# Patient Record
Sex: FEMALE | Race: WHITE | NOT HISPANIC OR LATINO | Employment: FULL TIME | URBAN - METROPOLITAN AREA
[De-identification: names, ages, dates, MRNs, and addresses within clinical notes are randomized per-mention and may not be internally consistent; named-entity substitution may affect disease eponyms.]

---

## 2021-03-30 ENCOUNTER — TRANSFERRED RECORDS (OUTPATIENT)
Dept: HEALTH INFORMATION MANAGEMENT | Facility: CLINIC | Age: 41
End: 2021-03-30

## 2023-06-20 ENCOUNTER — TELEPHONE (OUTPATIENT)
Dept: TRANSPLANT | Facility: CLINIC | Age: 43
End: 2023-06-20

## 2023-06-20 NOTE — TELEPHONE ENCOUNTER
Due to Phone Visit, verbal consent received for Care Everywhere Authorization from the patient during this visit.

## 2023-06-23 RX ORDER — MULTIVITAMIN,THERAPEUTIC
1 TABLET ORAL DAILY
COMMUNITY

## 2023-06-23 RX ORDER — TACROLIMUS 1 MG/1
2 CAPSULE ORAL 2 TIMES DAILY
COMMUNITY
Start: 2023-05-01 | End: 2023-10-24

## 2023-06-23 RX ORDER — PANTOPRAZOLE SODIUM 40 MG/1
1 TABLET, DELAYED RELEASE ORAL DAILY
COMMUNITY
Start: 2022-07-28 | End: 2024-09-23

## 2023-06-23 RX ORDER — MYCOPHENOLIC ACID 180 MG/1
TABLET, DELAYED RELEASE ORAL
COMMUNITY
Start: 2023-03-02 | End: 2024-03-05

## 2023-06-23 RX ORDER — ALBUTEROL SULFATE 90 UG/1
2 AEROSOL, METERED RESPIRATORY (INHALATION)
COMMUNITY
Start: 2021-12-27

## 2023-06-23 RX ORDER — TACROLIMUS 0.5 MG/1
0.5 CAPSULE ORAL DAILY
COMMUNITY
Start: 2023-05-01 | End: 2023-10-24

## 2023-06-23 NOTE — TELEPHONE ENCOUNTER
Records reviewed.  LRRT (mother) from 4/8/21, d/t IgA.  Most recent OP Transplant Nehprology visit note from Dr. Gilligan ([facility]) on 6/8/23.   Most recent creatinine 1.5, at baseline of 1.4 - 1.6.   No identified post-transplant complications (BK / infection, rejection, non-adherence).  Operative report available from Dr. Matthews 4/8/21.  Crossmatch unavailable in CE, PCS has reached out to [facility] to obtain.     Will establish with Dr. Dasilva 10/23/23.

## 2023-07-05 ENCOUNTER — MEDICAL CORRESPONDENCE (OUTPATIENT)
Dept: HEALTH INFORMATION MANAGEMENT | Facility: CLINIC | Age: 43
End: 2023-07-05

## 2023-08-12 ENCOUNTER — LAB (OUTPATIENT)
Dept: LAB | Facility: CLINIC | Age: 43
End: 2023-08-12
Payer: COMMERCIAL

## 2023-08-12 DIAGNOSIS — Z94.0 S/P KIDNEY TRANSPLANT: ICD-10-CM

## 2023-08-12 LAB
ALBUMIN MFR UR ELPH: 17.4 MG/DL
ALBUMIN UR-MCNC: 10 MG/DL
ANION GAP SERPL CALCULATED.3IONS-SCNC: 14 MMOL/L (ref 7–15)
APPEARANCE UR: CLEAR
BASOPHILS # BLD AUTO: 0.1 10E3/UL (ref 0–0.2)
BASOPHILS NFR BLD AUTO: 1 %
BILIRUB UR QL STRIP: NEGATIVE
BUN SERPL-MCNC: 21 MG/DL (ref 6–20)
CALCIUM SERPL-MCNC: 9.4 MG/DL (ref 8.6–10)
CHLORIDE SERPL-SCNC: 106 MMOL/L (ref 98–107)
COLOR UR AUTO: ABNORMAL
CREAT SERPL-MCNC: 1.76 MG/DL (ref 0.51–0.95)
CREAT UR-MCNC: 205 MG/DL
CREAT UR-MCNC: 208 MG/DL
DEPRECATED HCO3 PLAS-SCNC: 22 MMOL/L (ref 22–29)
EOSINOPHIL # BLD AUTO: 0.2 10E3/UL (ref 0–0.7)
EOSINOPHIL NFR BLD AUTO: 4 %
ERYTHROCYTE [DISTWIDTH] IN BLOOD BY AUTOMATED COUNT: 12.4 % (ref 10–15)
GFR SERPL CREATININE-BSD FRML MDRD: 36 ML/MIN/1.73M2
GLUCOSE SERPL-MCNC: 84 MG/DL (ref 70–99)
GLUCOSE UR STRIP-MCNC: NEGATIVE MG/DL
HCT VFR BLD AUTO: 39 % (ref 35–47)
HGB BLD-MCNC: 12.2 G/DL (ref 11.7–15.7)
HGB UR QL STRIP: NEGATIVE
IMM GRANULOCYTES # BLD: 0 10E3/UL
IMM GRANULOCYTES NFR BLD: 1 %
KETONES UR STRIP-MCNC: NEGATIVE MG/DL
LEUKOCYTE ESTERASE UR QL STRIP: NEGATIVE
LYMPHOCYTES # BLD AUTO: 1.2 10E3/UL (ref 0.8–5.3)
LYMPHOCYTES NFR BLD AUTO: 24 %
MAGNESIUM SERPL-MCNC: 2 MG/DL (ref 1.7–2.3)
MCH RBC QN AUTO: 28.4 PG (ref 26.5–33)
MCHC RBC AUTO-ENTMCNC: 31.3 G/DL (ref 31.5–36.5)
MCV RBC AUTO: 91 FL (ref 78–100)
MICROALBUMIN UR-MCNC: <12 MG/L
MICROALBUMIN/CREAT UR: NORMAL MG/G{CREAT}
MONOCYTES # BLD AUTO: 0.5 10E3/UL (ref 0–1.3)
MONOCYTES NFR BLD AUTO: 11 %
MUCOUS THREADS #/AREA URNS LPF: PRESENT /LPF
NEUTROPHILS # BLD AUTO: 3 10E3/UL (ref 1.6–8.3)
NEUTROPHILS NFR BLD AUTO: 59 %
NITRATE UR QL: NEGATIVE
NRBC # BLD AUTO: 0 10E3/UL
NRBC BLD AUTO-RTO: 0 /100
PH UR STRIP: 5.5 [PH] (ref 5–7)
PHOSPHATE SERPL-MCNC: 3.1 MG/DL (ref 2.5–4.5)
PLATELET # BLD AUTO: 215 10E3/UL (ref 150–450)
POTASSIUM SERPL-SCNC: 4.1 MMOL/L (ref 3.4–5.3)
PROT/CREAT 24H UR: 0.08 MG/MG CR (ref 0–0.2)
RBC # BLD AUTO: 4.29 10E6/UL (ref 3.8–5.2)
RBC URINE: 5 /HPF
SODIUM SERPL-SCNC: 142 MMOL/L (ref 136–145)
SP GR UR STRIP: 1.02 (ref 1–1.03)
SQUAMOUS EPITHELIAL: 3 /HPF
UROBILINOGEN UR STRIP-MCNC: NORMAL MG/DL
WBC # BLD AUTO: 4.9 10E3/UL (ref 4–11)
WBC URINE: 1 /HPF

## 2023-08-12 PROCEDURE — 85025 COMPLETE CBC W/AUTO DIFF WBC: CPT

## 2023-08-12 PROCEDURE — 80197 ASSAY OF TACROLIMUS: CPT

## 2023-08-12 PROCEDURE — 36415 COLL VENOUS BLD VENIPUNCTURE: CPT

## 2023-08-12 PROCEDURE — 84156 ASSAY OF PROTEIN URINE: CPT

## 2023-08-12 PROCEDURE — 82570 ASSAY OF URINE CREATININE: CPT

## 2023-08-12 PROCEDURE — 81001 URINALYSIS AUTO W/SCOPE: CPT

## 2023-08-12 PROCEDURE — 83735 ASSAY OF MAGNESIUM: CPT

## 2023-08-12 PROCEDURE — 80048 BASIC METABOLIC PNL TOTAL CA: CPT

## 2023-08-12 PROCEDURE — 84100 ASSAY OF PHOSPHORUS: CPT

## 2023-08-13 LAB
TACROLIMUS BLD-MCNC: 6 UG/L (ref 5–15)
TME LAST DOSE: NORMAL H
TME LAST DOSE: NORMAL H

## 2023-09-09 ENCOUNTER — LAB (OUTPATIENT)
Dept: LAB | Facility: CLINIC | Age: 43
End: 2023-09-09
Payer: COMMERCIAL

## 2023-09-09 DIAGNOSIS — Z94.0 S/P KIDNEY TRANSPLANT: ICD-10-CM

## 2023-09-09 LAB
ALBUMIN MFR UR ELPH: 8.4 MG/DL
ALBUMIN UR-MCNC: NEGATIVE MG/DL
ANION GAP SERPL CALCULATED.3IONS-SCNC: 10 MMOL/L (ref 7–15)
APPEARANCE UR: CLEAR
BASOPHILS # BLD AUTO: 0 10E3/UL (ref 0–0.2)
BASOPHILS NFR BLD AUTO: 1 %
BILIRUB UR QL STRIP: NEGATIVE
BUN SERPL-MCNC: 24 MG/DL (ref 6–20)
CALCIUM SERPL-MCNC: 9.6 MG/DL (ref 8.6–10)
CHLORIDE SERPL-SCNC: 106 MMOL/L (ref 98–107)
COLOR UR AUTO: NORMAL
CREAT SERPL-MCNC: 1.62 MG/DL (ref 0.51–0.95)
CREAT UR-MCNC: 98.9 MG/DL
CREAT UR-MCNC: 99.3 MG/DL
DEPRECATED HCO3 PLAS-SCNC: 24 MMOL/L (ref 22–29)
EGFRCR SERPLBLD CKD-EPI 2021: 40 ML/MIN/1.73M2
EOSINOPHIL # BLD AUTO: 0.1 10E3/UL (ref 0–0.7)
EOSINOPHIL NFR BLD AUTO: 2 %
ERYTHROCYTE [DISTWIDTH] IN BLOOD BY AUTOMATED COUNT: 12.2 % (ref 10–15)
GLUCOSE SERPL-MCNC: 92 MG/DL (ref 70–99)
GLUCOSE UR STRIP-MCNC: NEGATIVE MG/DL
HCT VFR BLD AUTO: 37.2 % (ref 35–47)
HGB BLD-MCNC: 11.9 G/DL (ref 11.7–15.7)
HGB UR QL STRIP: NEGATIVE
IMM GRANULOCYTES # BLD: 0 10E3/UL
IMM GRANULOCYTES NFR BLD: 0 %
KETONES UR STRIP-MCNC: NEGATIVE MG/DL
LEUKOCYTE ESTERASE UR QL STRIP: NEGATIVE
LYMPHOCYTES # BLD AUTO: 0.9 10E3/UL (ref 0.8–5.3)
LYMPHOCYTES NFR BLD AUTO: 16 %
MAGNESIUM SERPL-MCNC: 1.8 MG/DL (ref 1.7–2.3)
MCH RBC QN AUTO: 29 PG (ref 26.5–33)
MCHC RBC AUTO-ENTMCNC: 32 G/DL (ref 31.5–36.5)
MCV RBC AUTO: 91 FL (ref 78–100)
MICROALBUMIN UR-MCNC: <12 MG/L
MICROALBUMIN/CREAT UR: NORMAL MG/G{CREAT}
MONOCYTES # BLD AUTO: 0.6 10E3/UL (ref 0–1.3)
MONOCYTES NFR BLD AUTO: 12 %
NEUTROPHILS # BLD AUTO: 3.8 10E3/UL (ref 1.6–8.3)
NEUTROPHILS NFR BLD AUTO: 69 %
NITRATE UR QL: NEGATIVE
NRBC # BLD AUTO: 0 10E3/UL
NRBC BLD AUTO-RTO: 0 /100
PH UR STRIP: 6 [PH] (ref 5–7)
PHOSPHATE SERPL-MCNC: 3.2 MG/DL (ref 2.5–4.5)
PLATELET # BLD AUTO: 215 10E3/UL (ref 150–450)
POTASSIUM SERPL-SCNC: 4.5 MMOL/L (ref 3.4–5.3)
PROT/CREAT 24H UR: 0.08 MG/MG CR (ref 0–0.2)
RBC # BLD AUTO: 4.11 10E6/UL (ref 3.8–5.2)
RBC URINE: 0 /HPF
SODIUM SERPL-SCNC: 140 MMOL/L (ref 136–145)
SP GR UR STRIP: 1.02 (ref 1–1.03)
SQUAMOUS EPITHELIAL: 1 /HPF
TACROLIMUS BLD-MCNC: 5.3 UG/L (ref 5–15)
TME LAST DOSE: NORMAL H
TME LAST DOSE: NORMAL H
UROBILINOGEN UR STRIP-MCNC: NORMAL MG/DL
WBC # BLD AUTO: 5.5 10E3/UL (ref 4–11)
WBC URINE: 1 /HPF

## 2023-09-09 PROCEDURE — 84100 ASSAY OF PHOSPHORUS: CPT | Performed by: PATHOLOGY

## 2023-09-09 PROCEDURE — 80048 BASIC METABOLIC PNL TOTAL CA: CPT | Performed by: PATHOLOGY

## 2023-09-09 PROCEDURE — 80197 ASSAY OF TACROLIMUS: CPT | Performed by: INTERNAL MEDICINE

## 2023-09-09 PROCEDURE — 81001 URINALYSIS AUTO W/SCOPE: CPT | Performed by: PATHOLOGY

## 2023-09-09 PROCEDURE — 84156 ASSAY OF PROTEIN URINE: CPT | Performed by: PATHOLOGY

## 2023-09-09 PROCEDURE — 82570 ASSAY OF URINE CREATININE: CPT | Performed by: INTERNAL MEDICINE

## 2023-09-09 PROCEDURE — 85025 COMPLETE CBC W/AUTO DIFF WBC: CPT | Performed by: PATHOLOGY

## 2023-09-09 PROCEDURE — 36415 COLL VENOUS BLD VENIPUNCTURE: CPT | Performed by: PATHOLOGY

## 2023-09-09 PROCEDURE — 83735 ASSAY OF MAGNESIUM: CPT | Performed by: PATHOLOGY

## 2023-09-09 PROCEDURE — 99000 SPECIMEN HANDLING OFFICE-LAB: CPT | Performed by: PATHOLOGY

## 2023-10-07 ENCOUNTER — LAB (OUTPATIENT)
Dept: LAB | Facility: CLINIC | Age: 43
End: 2023-10-07
Payer: COMMERCIAL

## 2023-10-07 DIAGNOSIS — Z94.0 S/P KIDNEY TRANSPLANT: ICD-10-CM

## 2023-10-07 LAB
ALBUMIN MFR UR ELPH: 15.1 MG/DL
ALBUMIN UR-MCNC: NEGATIVE MG/DL
ANION GAP SERPL CALCULATED.3IONS-SCNC: 10 MMOL/L (ref 7–15)
APPEARANCE UR: CLEAR
BACTERIA #/AREA URNS HPF: ABNORMAL /HPF
BASO+EOS+MONOS # BLD AUTO: ABNORMAL 10*3/UL
BASO+EOS+MONOS NFR BLD AUTO: ABNORMAL %
BASOPHILS # BLD AUTO: 0.1 10E3/UL (ref 0–0.2)
BASOPHILS NFR BLD AUTO: 1 %
BILIRUB UR QL STRIP: NEGATIVE
BUN SERPL-MCNC: 22.1 MG/DL (ref 6–20)
CALCIUM SERPL-MCNC: 9.7 MG/DL (ref 8.6–10)
CHLORIDE SERPL-SCNC: 108 MMOL/L (ref 98–107)
COLOR UR AUTO: YELLOW
CREAT SERPL-MCNC: 1.55 MG/DL (ref 0.51–0.95)
CREAT UR-MCNC: 146 MG/DL
CREAT UR-MCNC: 146 MG/DL
DEPRECATED HCO3 PLAS-SCNC: 24 MMOL/L (ref 22–29)
EGFRCR SERPLBLD CKD-EPI 2021: 42 ML/MIN/1.73M2
EOSINOPHIL # BLD AUTO: 0.2 10E3/UL (ref 0–0.7)
EOSINOPHIL NFR BLD AUTO: 3 %
ERYTHROCYTE [DISTWIDTH] IN BLOOD BY AUTOMATED COUNT: 12.1 % (ref 10–15)
GLUCOSE SERPL-MCNC: 97 MG/DL (ref 70–99)
GLUCOSE UR STRIP-MCNC: NEGATIVE MG/DL
HCT VFR BLD AUTO: 41.4 % (ref 35–47)
HGB BLD-MCNC: 12.5 G/DL (ref 11.7–15.7)
HGB UR QL STRIP: NEGATIVE
IMM GRANULOCYTES # BLD: 0 10E3/UL
IMM GRANULOCYTES NFR BLD: 0 %
KETONES UR STRIP-MCNC: NEGATIVE MG/DL
LEUKOCYTE ESTERASE UR QL STRIP: NEGATIVE
LYMPHOCYTES # BLD AUTO: 1.2 10E3/UL (ref 0.8–5.3)
LYMPHOCYTES NFR BLD AUTO: 18 %
MAGNESIUM SERPL-MCNC: 1.8 MG/DL (ref 1.7–2.3)
MCH RBC QN AUTO: 27.9 PG (ref 26.5–33)
MCHC RBC AUTO-ENTMCNC: 30.2 G/DL (ref 31.5–36.5)
MCV RBC AUTO: 92 FL (ref 78–100)
MICROALBUMIN UR-MCNC: <12 MG/L
MICROALBUMIN/CREAT UR: NORMAL MG/G{CREAT}
MONOCYTES # BLD AUTO: 0.7 10E3/UL (ref 0–1.3)
MONOCYTES NFR BLD AUTO: 10 %
NEUTROPHILS # BLD AUTO: 4.5 10E3/UL (ref 1.6–8.3)
NEUTROPHILS NFR BLD AUTO: 68 %
NITRATE UR QL: NEGATIVE
PH UR STRIP: 6 [PH] (ref 5–7)
PHOSPHATE SERPL-MCNC: 3.1 MG/DL (ref 2.5–4.5)
PLATELET # BLD AUTO: 233 10E3/UL (ref 150–450)
POTASSIUM SERPL-SCNC: 5 MMOL/L (ref 3.4–5.3)
PROT/CREAT 24H UR: 0.1 MG/MG CR (ref 0–0.2)
RBC # BLD AUTO: 4.48 10E6/UL (ref 3.8–5.2)
RBC #/AREA URNS AUTO: ABNORMAL /HPF
SODIUM SERPL-SCNC: 142 MMOL/L (ref 135–145)
SP GR UR STRIP: 1.02 (ref 1–1.03)
SQUAMOUS #/AREA URNS AUTO: ABNORMAL /LPF
UROBILINOGEN UR STRIP-ACNC: 0.2 E.U./DL
WBC # BLD AUTO: 6.6 10E3/UL (ref 4–11)
WBC #/AREA URNS AUTO: ABNORMAL /HPF

## 2023-10-07 PROCEDURE — 80197 ASSAY OF TACROLIMUS: CPT

## 2023-10-07 PROCEDURE — 84100 ASSAY OF PHOSPHORUS: CPT

## 2023-10-07 PROCEDURE — 80048 BASIC METABOLIC PNL TOTAL CA: CPT

## 2023-10-07 PROCEDURE — 81001 URINALYSIS AUTO W/SCOPE: CPT

## 2023-10-07 PROCEDURE — 85025 COMPLETE CBC W/AUTO DIFF WBC: CPT

## 2023-10-07 PROCEDURE — 84156 ASSAY OF PROTEIN URINE: CPT

## 2023-10-07 PROCEDURE — 82043 UR ALBUMIN QUANTITATIVE: CPT

## 2023-10-07 PROCEDURE — 83735 ASSAY OF MAGNESIUM: CPT

## 2023-10-07 PROCEDURE — 82570 ASSAY OF URINE CREATININE: CPT

## 2023-10-07 PROCEDURE — 36415 COLL VENOUS BLD VENIPUNCTURE: CPT

## 2023-10-08 LAB
TACROLIMUS BLD-MCNC: 5.2 UG/L (ref 5–15)
TME LAST DOSE: NORMAL H
TME LAST DOSE: NORMAL H

## 2023-10-18 DIAGNOSIS — D84.9 IMMUNOSUPPRESSED STATUS (H): ICD-10-CM

## 2023-10-18 DIAGNOSIS — Z94.0 KIDNEY TRANSPLANTED: Primary | ICD-10-CM

## 2023-10-20 ENCOUNTER — TELEPHONE (OUTPATIENT)
Dept: TRANSPLANT | Facility: CLINIC | Age: 43
End: 2023-10-20
Payer: COMMERCIAL

## 2023-10-20 DIAGNOSIS — Z20.828 CONTACT WITH AND (SUSPECTED) EXPOSURE TO OTHER VIRAL COMMUNICABLE DISEASES: Primary | ICD-10-CM

## 2023-10-20 DIAGNOSIS — Z79.899 ENCOUNTER FOR LONG-TERM CURRENT USE OF MEDICATION: ICD-10-CM

## 2023-10-20 DIAGNOSIS — Z94.0 KIDNEY REPLACED BY TRANSPLANT: ICD-10-CM

## 2023-10-20 DIAGNOSIS — Z48.298 AFTERCARE FOLLOWING ORGAN TRANSPLANT: ICD-10-CM

## 2023-10-20 DIAGNOSIS — Z98.890 OTHER SPECIFIED POSTPROCEDURAL STATES: ICD-10-CM

## 2023-10-20 NOTE — TELEPHONE ENCOUNTER
Spoke with Daly to introduce myself as her transplant coordinator. Reviewed upcoming appointments and lab work expectations for lab work for the future (every other month), location of clinic, etc.     Patient had no questions. Tansler message sent with details. Lab orders updated.

## 2023-10-23 ENCOUNTER — OFFICE VISIT (OUTPATIENT)
Dept: TRANSPLANT | Facility: CLINIC | Age: 43
End: 2023-10-23
Attending: INTERNAL MEDICINE

## 2023-10-23 VITALS
OXYGEN SATURATION: 100 % | DIASTOLIC BLOOD PRESSURE: 92 MMHG | HEART RATE: 70 BPM | SYSTOLIC BLOOD PRESSURE: 143 MMHG | WEIGHT: 143 LBS

## 2023-10-23 DIAGNOSIS — Z48.298 AFTERCARE FOLLOWING ORGAN TRANSPLANT: Primary | ICD-10-CM

## 2023-10-23 DIAGNOSIS — N02.B9 IGA NEPHROPATHY: ICD-10-CM

## 2023-10-23 DIAGNOSIS — Z94.0 KIDNEY REPLACED BY TRANSPLANT: ICD-10-CM

## 2023-10-23 DIAGNOSIS — D84.9 IMMUNOSUPPRESSION (H): ICD-10-CM

## 2023-10-23 PROCEDURE — 99204 OFFICE O/P NEW MOD 45 MIN: CPT | Performed by: INTERNAL MEDICINE

## 2023-10-23 PROCEDURE — 99213 OFFICE O/P EST LOW 20 MIN: CPT | Performed by: INTERNAL MEDICINE

## 2023-10-23 NOTE — PROGRESS NOTES
TRANSPLANT NEPHROLOGY CHRONIC POST TRANSPLANT VISIT    Assessment & Plan   # LDKT: Stable   - Baseline Creatinine:  ~ 1.4-1.6   - Proteinuria: Normal (<0.2 grams)   - Date DSA Last Checked:      Latest DSA: Not checked recently, but was negative with last check   - BK Viremia: No   - Kidney Tx Biopsy: No    # Immunosuppression: Tacrolimus immediate release (goal 4-6) and Mycophenolic acid (dose 360, 180, 360)   - Continue with intensive monitoring of immunosuppression for efficacy and toxicity.   - Changes: Not at this time   -. IS intolerance: steroids-severe adverse effects: weight gain, insomnia, tapered off steroids early post transplant and MPA dose adjusted/increased   - note: 1 haplotype match, LDKTx from mother    # IgA nephropathy:   - monitor for hematuria and proteinuria    # Infection Prophylaxis:   - PJP: None check T cell subset    # Hypertension: Borderline control;  Goal BP: < 130/80   - Changes: Not at this time recommend close BP monitoring at home, bring home monitor to calibrate, if above goal will add antiHTN    # Mineral Bone Disorder:    - Secondary renal hyperparathyroidism; PTH level: Not checked recently        On treatment: None  - Vitamin D; level: Not checked recently        On supplement: Yes  - Calcium; level: Normal        On supplement: No  - Phosphorus; level: Normal        On supplement: No    # Electrolytes:   - Potassium; level: High normal        On supplement: No  - Magnesium; level: Normal        On supplement: No  - Bicarbonate; level: Normal        On supplement: No  - Sodium; level: Normal    # Thyroid nodule:   - followed regularly by PCP/endocrine,    +strong family hx of thyroid disease    # Skin Cancer Risk:    - Discussed sun protection and recommend regular follow up with Dermatology.    # Medical Compliance: Yes    # Health Maintenance and Vaccination Review: Reviewed and up to date    # Transplant History:  Etiology of Kidney Failure: IgA nephropathy  Tx: LDKT  "preemptive from mother (one haplotype match)  Transplant: 4/8/2021 (Kidney)  Significant changes in immunosuppression:  switched to steroid free regimen due to side effects  Significant transplant-related complications: None    Transplant Office Phone Number: [phone]    Assessment and plan was discussed with the patient and she voiced her understanding and agreement.    Return visit: Return in about 6 months (around 4/23/2024).    Rachna Dasilva MD    Chief Complaint   Ms. Seaman is a 42 year old here for kidney transplant and immunosuppression management.    History of Present Illness   Mrs. 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 Baseline Cr-1/4-1.6. she had severe intolerance to steroids and was weaned off prednisone durnig the early few months post transplant nad MPA dose adjusted. She had some GI issues on MMF and was switched to MPA tid dosing and she follows dairy/gluten free diet.. No hx of rejection, DSA, BK, infection, or malignancy. No IgA recurrence.     She presents today to establish care with transplant nephrology after her recent move from [place]. She feels good overall. She reports some stressors at work but overall doing well. Energy level is good. Denies any fevers, chills, weight loss, night sweats. No nausea, vomiting, abdominal pain, diarrhea. No chest pain, sob, leg swelling. 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         Problem List   IgA nephropathy  LDKTX 2021  Immunosuppression    Allergies   No Known " Allergies    Medications   Current Outpatient Medications   Medication Sig    albuterol (PROAIR HFA/PROVENTIL HFA/VENTOLIN HFA) 108 (90 Base) MCG/ACT inhaler Inhale 2 puffs into the lungs    cholecalciferol 50 MCG (2000 UT) CAPS Take 2,000 Units by mouth    levonorgestrel (MIRENA) 52 MG (20 mcg/day) IUD 1 each by Intrauterine route    Multiple Vitamins-Minerals (ONCOVITE) TABS Take 1 tablet by mouth daily    mycophenolic acid (GENERIC EQUIVALENT) 180 MG EC tablet Take 360mg (2 tablets) in the morning, take 180mg (1 tablet) midday, and take 360mg (2 tablets) in the evening.    pantoprazole (PROTONIX) 40 MG EC tablet Take 1 tablet by mouth daily    tacrolimus (GENERIC EQUIVALENT) 0.5 MG capsule Take 0.5 mg by mouth daily    tacrolimus (GENERIC EQUIVALENT) 1 MG capsule Take 2 mg by mouth 2 times daily     No current facility-administered medications for this visit.     There are no discontinued medications.    Physical Exam   Vital Signs: BP (!) 143/92   Pulse 70   Wt 64.9 kg (143 lb)   SpO2 100%     GENERAL APPEARANCE: alert and no distress  HENT: mouth without ulcers or lesions  RESP: lungs clear to auscultation - no rales, rhonchi or wheezes  CV: regular rhythm, normal rate, no rub, no murmur  EDEMA: no LE edema bilaterally  ABDOMEN: soft, nondistended, nontender, bowel sounds normal  MS: extremities normal - no gross deformities noted, no evidence of inflammation in joints, no muscle tenderness  SKIN: no rash      Data         Latest Ref Rng & Units 10/7/2023     9:09 AM 9/9/2023     7:57 AM 8/12/2023     8:44 AM   Renal   Sodium 135 - 145 mmol/L 142  140  142    K 3.4 - 5.3 mmol/L 5.0  4.5  4.1    Cl 98 - 107 mmol/L 108  106  106    Cl (external) 98 - 107 mmol/L 108  106  106    CO2 22 - 29 mmol/L 24  24  22    Urea Nitrogen 6.0 - 20.0 mg/dL 22.1  24.0  21.0    Creatinine 0.51 - 0.95 mg/dL 1.55  1.62  1.76    Glucose 70 - 99 mg/dL 97  92  84    Calcium 8.6 - 10.0 mg/dL 9.7  9.6  9.4    Magnesium 1.7 - 2.3 mg/dL  1.8  1.8  2.0          Latest Ref Rng & Units 10/7/2023     9:09 AM 9/9/2023     7:57 AM 8/12/2023     8:44 AM   Bone Health   Phosphorus 2.5 - 4.5 mg/dL 3.1  3.2  3.1          Latest Ref Rng & Units 10/7/2023     9:09 AM 9/9/2023     7:57 AM 8/12/2023     8:44 AM   Heme   WBC 4.0 - 11.0 10e3/uL 6.6  5.5  4.9    Hgb 11.7 - 15.7 g/dL 12.5  11.9  12.2    Plt 150 - 450 10e3/uL 233  215  215                    Recent Labs   Lab Test 08/12/23  0844 09/09/23  0757 10/07/23  0909   DOSTAC 8/11/2023 9/8/2023 10/6/2023   TACROL 6.0 5.3 5.2

## 2023-10-23 NOTE — LETTER
10/23/2023         RE: Daly Seaman  [address] [place] [address]        Dear Colleague,    Thank you for referring your patient, Daly Seaman, to the [facility] TRANSPLANT [facility]. Please see a copy of my visit note below.    TRANSPLANT NEPHROLOGY CHRONIC POST TRANSPLANT VISIT    Assessment & Plan  # LDKT: Stable   - Baseline Creatinine:  ~ 1.4-1.6   - Proteinuria: Normal (<0.2 grams)   - Date DSA Last Checked:      Latest DSA: Not checked recently, but was negative with last check   - BK Viremia: No   - Kidney Tx Biopsy: No    # Immunosuppression: Tacrolimus immediate release (goal 4-6) and Mycophenolic acid (dose 360, 180, 360)   - Continue with intensive monitoring of immunosuppression for efficacy and toxicity.   - Changes: Not at this time   -. IS intolerance: steroids-severe adverse effects: weight gain, insomnia, tapered off steroids early post transplant and MPA dose adjusted/increased   - note: 1 haplotype match, LDKTx from mother    # IgA nephropathy:   - monitor for hematuria and proteinuria    # Infection Prophylaxis:   - PJP: None check T cell subset    # Hypertension: Borderline control;  Goal BP: < 130/80   - Changes: Not at this time recommend close BP monitoring at home, bring home monitor to calibrate, if above goal will add antiHTN    # Mineral Bone Disorder:    - Secondary renal hyperparathyroidism; PTH level: Not checked recently        On treatment: None  - Vitamin D; level: Not checked recently        On supplement: Yes  - Calcium; level: Normal        On supplement: No  - Phosphorus; level: Normal        On supplement: No    # Electrolytes:   - Potassium; level: High normal        On supplement: No  - Magnesium; level: Normal        On supplement: No  - Bicarbonate; level: Normal        On supplement: No  - Sodium; level: Normal    # Thyroid nodule:   - followed regularly by PCP/endocrine,    +strong family hx of thyroid disease    # Skin Cancer Risk:    - Discussed  sun protection and recommend regular follow up with Dermatology.    # Medical Compliance: Yes    # Health Maintenance and Vaccination Review: Reviewed and up to date    # Transplant History:  Etiology of Kidney Failure: IgA nephropathy  Tx: LDKT preemptive from mother (one haplotype match)  Transplant: 4/8/2021 (Kidney)  Significant changes in immunosuppression:  switched to steroid free regimen due to side effects  Significant transplant-related complications: None    Transplant Office Phone Number: [phone]    Assessment and plan was discussed with the patient and she voiced her understanding and agreement.    Return visit: Return in about 6 months (around 4/23/2024).    Rachna Dasilva MD    Chief Complaint  Ms. Seaman is a 42 year old here for kidney transplant and immunosuppression management.    History of Present Illness  Mrs. 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 Baseline Cr-1/4-1.6. she had severe intolerance to steroids and was weaned off prednisone durnig the early few months post transplant nad MPA dose adjusted. She had some GI issues on MMF and was switched to MPA tid dosing and she follows dairy/gluten free diet.. No hx of rejection, DSA, BK, infection, or malignancy. No IgA recurrence.     She presents today to establish care with transplant nephrology after her recent move from [place]. She feels good overall. She reports some stressors at work but overall doing well. Energy level is good. Denies any fevers, chills, weight loss, night sweats. No nausea, vomiting, abdominal pain, diarrhea. No chest pain, sob, leg swelling. 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         Problem List  IgA nephropathy  LDKTX 2021  Immunosuppression    Allergies  No Known Allergies    Medications  Current Outpatient Medications   Medication Sig    albuterol (PROAIR HFA/PROVENTIL HFA/VENTOLIN HFA) 108 (90 Base) MCG/ACT inhaler Inhale 2 puffs into the lungs    cholecalciferol 50 MCG (2000 UT) CAPS Take 2,000 Units by mouth    levonorgestrel (MIRENA) 52 MG (20 mcg/day) IUD 1 each by Intrauterine route    Multiple Vitamins-Minerals (ONCOVITE) TABS Take 1 tablet by mouth daily    mycophenolic acid (GENERIC EQUIVALENT) 180 MG EC tablet Take 360mg (2 tablets) in the morning, take 180mg (1 tablet) midday, and take 360mg (2 tablets) in the evening.    pantoprazole (PROTONIX) 40 MG EC tablet Take 1 tablet by mouth daily    tacrolimus (GENERIC EQUIVALENT) 0.5 MG capsule Take 0.5 mg by mouth daily    tacrolimus (GENERIC EQUIVALENT) 1 MG capsule Take 2 mg by mouth 2 times daily     No current facility-administered medications for this visit.     There are no discontinued medications.    Physical Exam  Vital Signs: BP (!) 143/92   Pulse 70   Wt 64.9 kg (143 lb)   SpO2 100%     GENERAL APPEARANCE: alert and no distress  HENT: mouth without ulcers or lesions  RESP: lungs clear to auscultation - no rales, rhonchi or wheezes  CV: regular rhythm, normal rate, no rub, no murmur  EDEMA: no LE edema bilaterally  ABDOMEN: soft, nondistended, nontender, bowel sounds normal  MS: extremities normal - no gross deformities noted, no evidence of inflammation in joints, no muscle tenderness  SKIN: no rash      Data        Latest Ref Rng & Units 10/7/2023     9:09 AM 9/9/2023     7:57 AM 8/12/2023     8:44 AM   Renal   Sodium 135 - 145 mmol/L 142  140  142    K 3.4 - 5.3 mmol/L 5.0  4.5  4.1    Cl 98 - 107 mmol/L 108  106  106    Cl (external) 98 - 107 mmol/L 108  106  106  "   CO2 22 - 29 mmol/L 24  24  22    Urea Nitrogen 6.0 - 20.0 mg/dL 22.1  24.0  21.0    Creatinine 0.51 - 0.95 mg/dL 1.55  1.62  1.76    Glucose 70 - 99 mg/dL 97  92  84    Calcium 8.6 - 10.0 mg/dL 9.7  9.6  9.4    Magnesium 1.7 - 2.3 mg/dL 1.8  1.8  2.0          Latest Ref Rng & Units 10/7/2023     9:09 AM 9/9/2023     7:57 AM 8/12/2023     8:44 AM   Bone Health   Phosphorus 2.5 - 4.5 mg/dL 3.1  3.2  3.1          Latest Ref Rng & Units 10/7/2023     9:09 AM 9/9/2023     7:57 AM 8/12/2023     8:44 AM   Heme   WBC 4.0 - 11.0 10e3/uL 6.6  5.5  4.9    Hgb 11.7 - 15.7 g/dL 12.5  11.9  12.2    Plt 150 - 450 10e3/uL 233  215  215                    Recent Labs   Lab Test 08/12/23  0844 09/09/23  0757 10/07/23  0909   DOSTAC 8/11/2023 9/8/2023 10/6/2023   TACROL 6.0 5.3 5.2             Again, thank you for allowing me to participate in the care of your patient.        Sincerely,        Rachna Dasilva MD

## 2023-10-24 DIAGNOSIS — Z94.0 KIDNEY TRANSPLANTED: Primary | ICD-10-CM

## 2023-10-24 DIAGNOSIS — D84.9 IMMUNOSUPPRESSED STATUS (H): ICD-10-CM

## 2023-10-24 RX ORDER — TACROLIMUS 0.5 MG/1
0.5 CAPSULE ORAL DAILY
Qty: 90 CAPSULE | Refills: 3 | Status: SHIPPED | OUTPATIENT
Start: 2023-10-24 | End: 2024-10-02

## 2023-10-24 RX ORDER — TACROLIMUS 0.5 MG/1
0.5 CAPSULE ORAL EVERY EVENING
Qty: 90 CAPSULE | Refills: 0 | Status: SHIPPED | OUTPATIENT
Start: 2023-10-24 | End: 2024-01-17

## 2023-10-24 RX ORDER — TACROLIMUS 1 MG/1
2 CAPSULE, GELATIN COATED ORAL 2 TIMES DAILY
Qty: 360 CAPSULE | Refills: 0 | Status: SHIPPED | OUTPATIENT
Start: 2023-10-24 | End: 2024-01-17

## 2023-10-24 RX ORDER — TACROLIMUS 1 MG/1
2 CAPSULE ORAL 2 TIMES DAILY
Qty: 360 CAPSULE | Refills: 3 | Status: ON HOLD | OUTPATIENT
Start: 2023-10-24 | End: 2024-02-11

## 2023-11-10 ASSESSMENT — ENCOUNTER SYMPTOMS
COUGH: 0
DIZZINESS: 0
FREQUENCY: 0
CONSTIPATION: 0
HEMATOCHEZIA: 0
MYALGIAS: 0
PARESTHESIAS: 0
WEAKNESS: 0
SHORTNESS OF BREATH: 0
PALPITATIONS: 0
ABDOMINAL PAIN: 0
DIARRHEA: 0
DYSURIA: 0
NAUSEA: 0
ARTHRALGIAS: 1
JOINT SWELLING: 0
FEVER: 0
EYE PAIN: 0
CHILLS: 0
BREAST MASS: 0
HEMATURIA: 0
HEADACHES: 0
NERVOUS/ANXIOUS: 0
SORE THROAT: 0
HEARTBURN: 0

## 2023-11-14 ENCOUNTER — OFFICE VISIT (OUTPATIENT)
Dept: FAMILY MEDICINE | Facility: CLINIC | Age: 43
End: 2023-11-14
Payer: COMMERCIAL

## 2023-11-14 VITALS
RESPIRATION RATE: 16 BRPM | SYSTOLIC BLOOD PRESSURE: 137 MMHG | OXYGEN SATURATION: 100 % | WEIGHT: 143.3 LBS | TEMPERATURE: 99 F | HEART RATE: 75 BPM | DIASTOLIC BLOOD PRESSURE: 86 MMHG

## 2023-11-14 DIAGNOSIS — Z00.00 ANNUAL PHYSICAL EXAM: Primary | ICD-10-CM

## 2023-11-14 DIAGNOSIS — R03.0 ELEVATED BP WITHOUT DIAGNOSIS OF HYPERTENSION: ICD-10-CM

## 2023-11-14 DIAGNOSIS — N02.B9 IGA NEPHROPATHY: ICD-10-CM

## 2023-11-14 DIAGNOSIS — Z94.0 KIDNEY REPLACED BY TRANSPLANT: ICD-10-CM

## 2023-11-14 DIAGNOSIS — R87.612 PAP SMEAR ABNORMALITY OF CERVIX WITH LGSIL: ICD-10-CM

## 2023-11-14 DIAGNOSIS — Z12.4 CERVICAL CANCER SCREENING: ICD-10-CM

## 2023-11-14 DIAGNOSIS — E04.1 THYROID NODULE: ICD-10-CM

## 2023-11-14 PROCEDURE — G0145 SCR C/V CYTO,THINLAYER,RESCR: HCPCS | Performed by: INTERNAL MEDICINE

## 2023-11-14 PROCEDURE — 87624 HPV HI-RISK TYP POOLED RSLT: CPT | Performed by: INTERNAL MEDICINE

## 2023-11-14 PROCEDURE — 99386 PREV VISIT NEW AGE 40-64: CPT | Performed by: INTERNAL MEDICINE

## 2023-11-14 PROCEDURE — 99214 OFFICE O/P EST MOD 30 MIN: CPT | Mod: 25 | Performed by: INTERNAL MEDICINE

## 2023-11-14 PROCEDURE — G0124 SCREEN C/V THIN LAYER BY MD: HCPCS | Performed by: PATHOLOGY

## 2023-11-14 ASSESSMENT — ENCOUNTER SYMPTOMS
NAUSEA: 0
DIARRHEA: 0
HEARTBURN: 0
MYALGIAS: 0
HEMATURIA: 0
BREAST MASS: 0
PARESTHESIAS: 0
ABDOMINAL PAIN: 0
ARTHRALGIAS: 1
HEMATOCHEZIA: 0
HEADACHES: 0
NERVOUS/ANXIOUS: 0
CHILLS: 0
FEVER: 0
WEAKNESS: 0
SORE THROAT: 0
DIZZINESS: 0
SHORTNESS OF BREATH: 0
JOINT SWELLING: 0
COUGH: 0
FREQUENCY: 0
CONSTIPATION: 0
PALPITATIONS: 0
EYE PAIN: 0
DYSURIA: 0

## 2023-11-14 ASSESSMENT — PAIN SCALES - GENERAL: PAINLEVEL: NO PAIN (0)

## 2023-11-14 NOTE — PATIENT INSTRUCTIONS
You are due for mammogram.  Please call the following number to make appointment :  [phone]  It is located in [address]    Purchase a home blood pressure cuff that  checks your blood pressure on your arm not  your wrist.     [employer] is a good brand. You can  check if the cuff you purchased is valid by going  to validateBP.org    Take your blood pressure after 5 minutes of rest  in the AM and PM for one week and record the  readings (14 total readings).    Send me a [employer] message with those  readings upon doing so.

## 2023-11-14 NOTE — PROGRESS NOTES
SUBJECTIVE:   CC: Daly is an 43 year old who presents for preventive health visit.       Healthy Habits:     Getting at least 3 servings of Calcium per day:  Yes    Bi-annual eye exam:  Yes    Dental care twice a year:  Yes    Sleep apnea or symptoms of sleep apnea:  Daytime drowsiness    Diet:  Other    Frequency of exercise:  4-5 days/week    Duration of exercise:  45-60 minutes    Taking medications regularly:  Yes    Medication side effects:  Not applicable    Additional concerns today:  No    Daly is here to establish care.   She has hx of CKD due to IgA nephropathy s/p kidney transplant in  (living donor - her mother)  She recently moved from [place] to [place] in July.   She is currently on tacrolimus and mycophenolate and she has established care with a transplant nephrologist in the [facility].  She has hx of thyroid nodule.   Due for mammogram, Abnormal pap last year - due today   Vaccines: utd  IUD - inserted in 2023  Nonsmoker   ETOH: < 2 drinks per week   Fam hx: negative     Reading notes from her nephrologist, recommended getting back on ACEi/ARB if HTN per blood pressure readings.       Have you ever done Advance Care Planning? (For example, a Health Directive, POLST, or a discussion with a medical provider or your loved ones about your wishes): No, advance care planning information given to patient to review.  Patient declined advance care planning discussion at this time.    Social History     Tobacco Use    Smoking status: Never    Smokeless tobacco: Never   Substance Use Topics    Alcohol use: Never             11/10/2023     3:25 PM   Alcohol Use   Prescreen: >3 drinks/day or >7 drinks/week? No     Reviewed orders with patient.  Reviewed health maintenance and updated orders accordingly - Yes  Lab work is in process    Breast Cancer Screenin/10/2023     3:26 PM   Breast CA Risk Assessment (FHS-7)   Do you have a family history of breast, colon, or ovarian cancer? No / Unknown  "    Mammogram Screening - Offered annual screening and updated [facility] Maintenance for [place] plan based on risk factor consideration  Pertinent mammograms are reviewed under the imaging tab.    History of abnormal Pap smear: Last 3 Pap Results: No results found for: \"PAP\"     Reviewed and updated as needed this visit by clinical staff   Tobacco  Allergies  Meds            Review of Systems   Constitutional:  Negative for chills and fever.   HENT:  Negative for congestion, ear pain, hearing loss and sore throat.    Eyes:  Negative for pain and visual disturbance.   Respiratory:  Negative for cough and shortness of breath.    Cardiovascular:  Negative for chest pain, palpitations and peripheral edema.   Gastrointestinal:  Negative for abdominal pain, constipation, diarrhea, heartburn, hematochezia and nausea.   Breasts:  Negative for tenderness, breast mass and discharge.   Genitourinary:  Negative for dysuria, frequency, genital sores, hematuria, pelvic pain, urgency, vaginal bleeding and vaginal discharge.   Musculoskeletal:  Positive for arthralgias. Negative for joint swelling and myalgias.   Skin:  Negative for rash.   Neurological:  Negative for dizziness, weakness, headaches and paresthesias.   Psychiatric/Behavioral:  Negative for mood changes. The patient is not nervous/anxious.      OBJECTIVE:   /86 (BP Location: Right arm, Patient Position: Sitting, Cuff Size: Adult Regular)   Pulse 75   Temp 99  F (37.2  C)   Resp 16   Wt 65 kg (143 lb 4.8 oz)   SpO2 100%   Physical Exam  Vitals reviewed.   Constitutional:       Appearance: Normal appearance.   HENT:      Right Ear: Tympanic membrane normal. There is no impacted cerumen.      Left Ear: Tympanic membrane normal. There is no impacted cerumen.      Mouth/Throat:      Mouth: Mucous membranes are moist.      Pharynx: Oropharynx is clear. No oropharyngeal exudate or posterior oropharyngeal erythema.   Cardiovascular:      Rate and Rhythm: Normal " rate and regular rhythm.      Heart sounds: Normal heart sounds. No murmur heard.     No gallop.   Pulmonary:      Effort: Pulmonary effort is normal. No respiratory distress.      Breath sounds: Normal breath sounds. No stridor. No wheezing, rhonchi or rales.   Abdominal:      General: Abdomen is flat. There is no distension.      Palpations: Abdomen is soft. There is no mass.      Tenderness: There is no abdominal tenderness. There is no guarding.      Hernia: No hernia is present.   Genitourinary:     Vagina: Normal.      Cervix: Normal.      Comments: IUD strings visualized.  Musculoskeletal:         General: Normal range of motion.      Cervical back: Normal range of motion and neck supple. No rigidity or tenderness.      Right lower leg: No edema.      Left lower leg: No edema.   Lymphadenopathy:      Cervical: No cervical adenopathy.   Skin:     General: Skin is warm and dry.   Neurological:      Mental Status: She is alert.   Psychiatric:         Mood and Affect: Mood normal.         ASSESSMENT/PLAN:   Diagnoses and all orders for this visit:    Annual physical exam  -     Lipid Profile; Future  Up to date with colon cancer screening     IgA nephropathy  Kidney replaced by transplant  Stable. Wants to establish care with general nephrology. referral given    Thyroid nodule  US done in jan 2023 showed stable thyroid nodule.   Plan to recheck every 3-5 years.     Elevated BP without diagnosis of hypertension  Discussed with patient to check BP daily for the next 2 weeks, send [employer] message with readings.  -     Adult Nephrology  Referral; Future    Pap smear abnormality of cervix with LGSIL  Cervical cancer screening  -     Pap thin layer screen with HPV - recommended age 30 - 65 years; Future  -     Pap thin layer screen with HPV - recommended age 30 - 65 years  -     HPV Hold (Lab Only)    Other orders  -     PRIMARY CARE FOLLOW-UP SCHEDULING; Future        Patient has been advised of split  billing requirements and indicates understanding: Yes      COUNSELING:  Reviewed preventive health counseling, as reflected in patient instructions       Mammogram and pap    She reports that she has never smoked. She has never used smokeless tobacco.          Samira Abad MD  [facility]

## 2023-11-15 ENCOUNTER — MYC MEDICAL ADVICE (OUTPATIENT)
Dept: FAMILY MEDICINE | Facility: CLINIC | Age: 43
End: 2023-11-15
Payer: COMMERCIAL

## 2023-11-15 NOTE — LETTER
November 18, 2023          Daly Seaman is under my care at [facility].   Daly is permitted to work without restrictions.     Do not hesitate to reach out with any questions.        Dr Samira Abad  [facility]

## 2023-11-20 LAB
BKR LAB AP GYN ADEQUACY: ABNORMAL
BKR LAB AP GYN INTERPRETATION: ABNORMAL
BKR LAB AP HPV REFLEX: ABNORMAL
BKR LAB AP PREVIOUS ABNL DX: ABNORMAL
BKR LAB AP PREVIOUS ABNORMAL: ABNORMAL
PATH REPORT.COMMENTS IMP SPEC: ABNORMAL
PATH REPORT.COMMENTS IMP SPEC: ABNORMAL
PATH REPORT.RELEVANT HX SPEC: ABNORMAL

## 2023-11-21 ENCOUNTER — PATIENT OUTREACH (OUTPATIENT)
Dept: FAMILY MEDICINE | Facility: CLINIC | Age: 43
End: 2023-11-21
Payer: COMMERCIAL

## 2023-11-21 LAB
HUMAN PAPILLOMA VIRUS 16 DNA: NEGATIVE
HUMAN PAPILLOMA VIRUS 18 DNA: NEGATIVE
HUMAN PAPILLOMA VIRUS FINAL DIAGNOSIS: NORMAL
HUMAN PAPILLOMA VIRUS OTHER HR: NEGATIVE

## 2023-11-22 NOTE — RESULT ENCOUNTER NOTE
Message left to return call to [phone].    Results and recommendation released to Gaudena.   
70

## 2023-12-02 ENCOUNTER — LAB (OUTPATIENT)
Dept: LAB | Facility: CLINIC | Age: 43
End: 2023-12-02
Payer: COMMERCIAL

## 2023-12-02 DIAGNOSIS — Z48.298 AFTERCARE FOLLOWING ORGAN TRANSPLANT: ICD-10-CM

## 2023-12-02 DIAGNOSIS — Z98.890 OTHER SPECIFIED POSTPROCEDURAL STATES: ICD-10-CM

## 2023-12-02 DIAGNOSIS — Z20.828 CONTACT WITH AND (SUSPECTED) EXPOSURE TO OTHER VIRAL COMMUNICABLE DISEASES: ICD-10-CM

## 2023-12-02 DIAGNOSIS — Z79.899 ENCOUNTER FOR LONG-TERM CURRENT USE OF MEDICATION: ICD-10-CM

## 2023-12-02 DIAGNOSIS — Z94.0 KIDNEY REPLACED BY TRANSPLANT: ICD-10-CM

## 2023-12-02 DIAGNOSIS — Z00.00 ANNUAL PHYSICAL EXAM: ICD-10-CM

## 2023-12-02 LAB
ALBUMIN MFR UR ELPH: 18.9 MG/DL
ANION GAP SERPL CALCULATED.3IONS-SCNC: 11 MMOL/L (ref 7–15)
BUN SERPL-MCNC: 19.3 MG/DL (ref 6–20)
CALCIUM SERPL-MCNC: 9.7 MG/DL (ref 8.6–10)
CHLORIDE SERPL-SCNC: 106 MMOL/L (ref 98–107)
CHOLEST SERPL-MCNC: 158 MG/DL
CREAT SERPL-MCNC: 1.59 MG/DL (ref 0.51–0.95)
CREAT UR-MCNC: 215 MG/DL
DEPRECATED HCO3 PLAS-SCNC: 25 MMOL/L (ref 22–29)
EGFRCR SERPLBLD CKD-EPI 2021: 41 ML/MIN/1.73M2
ERYTHROCYTE [DISTWIDTH] IN BLOOD BY AUTOMATED COUNT: 12.4 % (ref 10–15)
GLUCOSE SERPL-MCNC: 80 MG/DL (ref 70–99)
HCT VFR BLD AUTO: 39.7 % (ref 35–47)
HDLC SERPL-MCNC: 76 MG/DL
HGB BLD-MCNC: 12.3 G/DL (ref 11.7–15.7)
LDLC SERPL CALC-MCNC: 71 MG/DL
MCH RBC QN AUTO: 28 PG (ref 26.5–33)
MCHC RBC AUTO-ENTMCNC: 31 G/DL (ref 31.5–36.5)
MCV RBC AUTO: 90 FL (ref 78–100)
NONHDLC SERPL-MCNC: 82 MG/DL
PLATELET # BLD AUTO: 238 10E3/UL (ref 150–450)
POTASSIUM SERPL-SCNC: 4.4 MMOL/L (ref 3.4–5.3)
PROT/CREAT 24H UR: 0.09 MG/MG CR (ref 0–0.2)
RBC # BLD AUTO: 4.39 10E6/UL (ref 3.8–5.2)
SODIUM SERPL-SCNC: 142 MMOL/L (ref 135–145)
TRIGL SERPL-MCNC: 53 MG/DL
WBC # BLD AUTO: 4.7 10E3/UL (ref 4–11)

## 2023-12-02 PROCEDURE — 85027 COMPLETE CBC AUTOMATED: CPT

## 2023-12-02 PROCEDURE — 80061 LIPID PANEL: CPT

## 2023-12-02 PROCEDURE — 80197 ASSAY OF TACROLIMUS: CPT

## 2023-12-02 PROCEDURE — 84156 ASSAY OF PROTEIN URINE: CPT

## 2023-12-02 PROCEDURE — 80048 BASIC METABOLIC PNL TOTAL CA: CPT

## 2023-12-02 PROCEDURE — 36415 COLL VENOUS BLD VENIPUNCTURE: CPT

## 2023-12-03 LAB
TACROLIMUS BLD-MCNC: 6.6 UG/L (ref 5–15)
TME LAST DOSE: NORMAL H
TME LAST DOSE: NORMAL H

## 2023-12-19 ENCOUNTER — HOSPITAL ENCOUNTER (OUTPATIENT)
Dept: MAMMOGRAPHY | Facility: CLINIC | Age: 43
Discharge: HOME | End: 2023-12-19
Attending: INTERNAL MEDICINE | Admitting: INTERNAL MEDICINE
Payer: COMMERCIAL

## 2023-12-19 DIAGNOSIS — Z12.31 VISIT FOR SCREENING MAMMOGRAM: ICD-10-CM

## 2023-12-19 PROCEDURE — 77067 SCR MAMMO BI INCL CAD: CPT

## 2024-01-09 DIAGNOSIS — Z01.812 PRE-PROCEDURAL LABORATORY EXAMINATION: Primary | ICD-10-CM

## 2024-01-12 DIAGNOSIS — N02.8 BENIGN HEMATURIA DUE TO IGM NEPHROPATHY: Primary | ICD-10-CM

## 2024-01-12 DIAGNOSIS — I10 ESSENTIAL HYPERTENSION, MALIGNANT: ICD-10-CM

## 2024-01-15 NOTE — PROGRESS NOTES
[facility]  OB/GYN Clinic Note    INDICATIONS:                                                    Is a pregnancy test required: Yes.  Was it positive or negative?  Negative  Was a consent obtained?  Yes    Daly Seaman, is a 43 year old female, who had a recent LGSIL pap.  HPV Negative Yes prior history of abnormal pap. Immunosuppressed. Here today for colposcopy. Discussed indication, risks of infection and bleeding.    Her last pap was   Lab Results   Component Value Date    GYNINTERP  11/14/2023     Low-grade squamous intraepithelial lesion (LSIL) encompassing HPV/mild dysplasia/CIN1    .  9/15/16 NIL Pap, Neg HPV  10/21/20 ASCUS pap, neg HPV  10/26/22 LSIL pap, neg HPV  12/2/22 [facility] ECC - negative, visually normal.  11/13/23 LSIL pap, neg HPV.  PROCEDURE:                                                    The RBA of colposcopy were explained to the patient and she wishes to proceed. ID confirmed and consent signed. UPT was negative.    Ext: unremarkable  EGBUS:WNL  Vagina:No lesions    Cervix:  Technically challenging colposcopy due to cervix positioning   IUD strings visualized   Stained with acetic acid   Transformation zone is fully visualized  AWE: at 3 and 9 o'clock  Atypical vessels: Small punctuations at 6 o'clock  Biopsies:  Biopsy at 6 o'clock and ECC were performed and hemastasis achieved with silver nitrate. Patient tolerated the procedure well.  Impression: LAUREEN 1       POST PROCEDURE:                                                      Daly Seaman is a 43 year old. with LSIL HPV neg pap  The significance of pap smear and its abnormality discussed at length. Has history of kidney transplant- reivewed this history in context of HPV infection, immunosuppression. Received first dose today.   Discussed methods to minimize risk of cervical cancer:  tobacco use, HPV vaccine.  Follow-up pending results.    In addition to colposcopy, 30 minutes were spent on the date of the  encounter doing chart review, review of outside records, review and interpretation of pertinent test results, history and exam, documentation, patient counseling, and further activities as noted above.      Paty Unger MD

## 2024-01-17 ENCOUNTER — LAB (OUTPATIENT)
Dept: LAB | Facility: CLINIC | Age: 44
End: 2024-01-17
Payer: COMMERCIAL

## 2024-01-17 ENCOUNTER — OFFICE VISIT (OUTPATIENT)
Dept: OBGYN | Facility: CLINIC | Age: 44
End: 2024-01-17
Payer: COMMERCIAL

## 2024-01-17 VITALS
HEIGHT: 71 IN | BODY MASS INDEX: 19.96 KG/M2 | WEIGHT: 142.6 LBS | DIASTOLIC BLOOD PRESSURE: 82 MMHG | SYSTOLIC BLOOD PRESSURE: 124 MMHG

## 2024-01-17 DIAGNOSIS — Z01.812 PRE-PROCEDURAL LABORATORY EXAMINATION: ICD-10-CM

## 2024-01-17 DIAGNOSIS — Z71.85 HPV VACCINE COUNSELING: ICD-10-CM

## 2024-01-17 DIAGNOSIS — R87.612 PAPANICOLAOU SMEAR OF CERVIX WITH LOW GRADE SQUAMOUS INTRAEPITHELIAL LESION (LGSIL): Primary | ICD-10-CM

## 2024-01-17 DIAGNOSIS — Z23 NEED FOR VACCINATION: ICD-10-CM

## 2024-01-17 LAB — HCG UR QL: NEGATIVE

## 2024-01-17 PROCEDURE — 90471 IMMUNIZATION ADMIN: CPT | Performed by: STUDENT IN AN ORGANIZED HEALTH CARE EDUCATION/TRAINING PROGRAM

## 2024-01-17 PROCEDURE — 81025 URINE PREGNANCY TEST: CPT | Performed by: STUDENT IN AN ORGANIZED HEALTH CARE EDUCATION/TRAINING PROGRAM

## 2024-01-17 PROCEDURE — 57454 BX/CURETT OF CERVIX W/SCOPE: CPT | Performed by: STUDENT IN AN ORGANIZED HEALTH CARE EDUCATION/TRAINING PROGRAM

## 2024-01-17 PROCEDURE — 90651 9VHPV VACCINE 2/3 DOSE IM: CPT | Performed by: STUDENT IN AN ORGANIZED HEALTH CARE EDUCATION/TRAINING PROGRAM

## 2024-01-17 PROCEDURE — 88305 TISSUE EXAM BY PATHOLOGIST: CPT | Performed by: PATHOLOGY

## 2024-01-17 RX ORDER — ASPIRIN 81 MG/1
81 TABLET ORAL DAILY
COMMUNITY

## 2024-01-17 RX ORDER — DIPHENHYDRAMINE HYDROCHLORIDE 25 MG/1
1 TABLET ORAL
COMMUNITY

## 2024-01-17 RX ORDER — LISINOPRIL 2.5 MG/1
2.5 TABLET ORAL
Status: ON HOLD | COMMUNITY
Start: 2023-12-26 | End: 2024-02-11

## 2024-01-17 RX ORDER — ERGOCALCIFEROL 1.25 MG/1
CAPSULE, LIQUID FILLED ORAL
COMMUNITY
Start: 2023-04-28

## 2024-01-19 ENCOUNTER — PATIENT OUTREACH (OUTPATIENT)
Dept: OBGYN | Facility: CLINIC | Age: 44
End: 2024-01-19
Payer: COMMERCIAL

## 2024-01-19 ENCOUNTER — TELEPHONE (OUTPATIENT)
Dept: OBGYN | Facility: CLINIC | Age: 44
End: 2024-01-19
Payer: COMMERCIAL

## 2024-01-19 PROBLEM — N87.0 DYSPLASIA OF CERVIX, LOW GRADE (CIN 1): Status: ACTIVE | Noted: 2023-11-15

## 2024-01-19 PROBLEM — R87.612 PAPANICOLAOU SMEAR OF CERVIX WITH LOW GRADE SQUAMOUS INTRAEPITHELIAL LESION (LGSIL): Status: ACTIVE | Noted: 2023-11-15

## 2024-01-19 LAB
PATH REPORT.COMMENTS IMP SPEC: NORMAL
PATH REPORT.COMMENTS IMP SPEC: NORMAL
PATH REPORT.FINAL DX SPEC: NORMAL
PATH REPORT.GROSS SPEC: NORMAL
PATH REPORT.MICROSCOPIC SPEC OTHER STN: NORMAL
PATH REPORT.RELEVANT HX SPEC: NORMAL
PHOTO IMAGE: NORMAL

## 2024-02-03 ENCOUNTER — LAB (OUTPATIENT)
Dept: LAB | Facility: CLINIC | Age: 44
End: 2024-02-03
Payer: COMMERCIAL

## 2024-02-03 DIAGNOSIS — Z48.298 AFTERCARE FOLLOWING ORGAN TRANSPLANT: ICD-10-CM

## 2024-02-03 DIAGNOSIS — Z79.899 ENCOUNTER FOR LONG-TERM CURRENT USE OF MEDICATION: ICD-10-CM

## 2024-02-03 DIAGNOSIS — Z98.890 OTHER SPECIFIED POSTPROCEDURAL STATES: ICD-10-CM

## 2024-02-03 DIAGNOSIS — Z20.828 CONTACT WITH AND (SUSPECTED) EXPOSURE TO OTHER VIRAL COMMUNICABLE DISEASES: ICD-10-CM

## 2024-02-03 DIAGNOSIS — Z94.0 KIDNEY REPLACED BY TRANSPLANT: ICD-10-CM

## 2024-02-03 LAB
ANION GAP SERPL CALCULATED.3IONS-SCNC: 8 MMOL/L (ref 7–15)
BUN SERPL-MCNC: 21.7 MG/DL (ref 6–20)
CALCIUM SERPL-MCNC: 9.4 MG/DL (ref 8.6–10)
CHLORIDE SERPL-SCNC: 105 MMOL/L (ref 98–107)
CREAT SERPL-MCNC: 1.62 MG/DL (ref 0.51–0.95)
DEPRECATED HCO3 PLAS-SCNC: 23 MMOL/L (ref 22–29)
EGFRCR SERPLBLD CKD-EPI 2021: 40 ML/MIN/1.73M2
ERYTHROCYTE [DISTWIDTH] IN BLOOD BY AUTOMATED COUNT: 11.9 % (ref 10–15)
GLUCOSE SERPL-MCNC: 85 MG/DL (ref 70–99)
HCT VFR BLD AUTO: 40.2 % (ref 35–47)
HGB BLD-MCNC: 12.4 G/DL (ref 11.7–15.7)
MCH RBC QN AUTO: 28 PG (ref 26.5–33)
MCHC RBC AUTO-ENTMCNC: 30.8 G/DL (ref 31.5–36.5)
MCV RBC AUTO: 91 FL (ref 78–100)
PLATELET # BLD AUTO: 232 10E3/UL (ref 150–450)
POTASSIUM SERPL-SCNC: 4.7 MMOL/L (ref 3.4–5.3)
RBC # BLD AUTO: 4.43 10E6/UL (ref 3.8–5.2)
SODIUM SERPL-SCNC: 136 MMOL/L (ref 135–145)
WBC # BLD AUTO: 4.6 10E3/UL (ref 4–11)

## 2024-02-03 PROCEDURE — 85027 COMPLETE CBC AUTOMATED: CPT

## 2024-02-03 PROCEDURE — 80048 BASIC METABOLIC PNL TOTAL CA: CPT

## 2024-02-03 PROCEDURE — 36415 COLL VENOUS BLD VENIPUNCTURE: CPT

## 2024-02-03 PROCEDURE — 80197 ASSAY OF TACROLIMUS: CPT

## 2024-02-04 LAB
TACROLIMUS BLD-MCNC: 6.2 UG/L (ref 5–15)
TME LAST DOSE: NORMAL H
TME LAST DOSE: NORMAL H

## 2024-02-09 ENCOUNTER — APPOINTMENT (OUTPATIENT)
Dept: CT IMAGING | Facility: CLINIC | Age: 44
DRG: 699 | End: 2024-02-09
Attending: EMERGENCY MEDICINE
Payer: COMMERCIAL

## 2024-02-09 ENCOUNTER — TELEPHONE (OUTPATIENT)
Dept: TRANSPLANT | Facility: CLINIC | Age: 44
End: 2024-02-09
Payer: COMMERCIAL

## 2024-02-09 ENCOUNTER — HOSPITAL ENCOUNTER (INPATIENT)
Facility: CLINIC | Age: 44
LOS: 2 days | Discharge: HOME | DRG: 699 | End: 2024-02-11
Attending: EMERGENCY MEDICINE | Admitting: INTERNAL MEDICINE
Payer: COMMERCIAL

## 2024-02-09 ENCOUNTER — APPOINTMENT (OUTPATIENT)
Dept: ULTRASOUND IMAGING | Facility: CLINIC | Age: 44
DRG: 699 | End: 2024-02-09
Attending: EMERGENCY MEDICINE
Payer: COMMERCIAL

## 2024-02-09 DIAGNOSIS — D84.9 IMMUNOSUPPRESSED STATUS (H): ICD-10-CM

## 2024-02-09 DIAGNOSIS — E87.20 METABOLIC ACIDOSIS: ICD-10-CM

## 2024-02-09 DIAGNOSIS — N02.B9 IGA NEPHROPATHY: ICD-10-CM

## 2024-02-09 DIAGNOSIS — N17.9 ACUTE KIDNEY INJURY (H): ICD-10-CM

## 2024-02-09 DIAGNOSIS — R03.0 ELEVATED BP WITHOUT DIAGNOSIS OF HYPERTENSION: Primary | ICD-10-CM

## 2024-02-09 DIAGNOSIS — R11.2 NAUSEA AND VOMITING, UNSPECIFIED VOMITING TYPE: ICD-10-CM

## 2024-02-09 DIAGNOSIS — Z94.0 KIDNEY TRANSPLANTED: ICD-10-CM

## 2024-02-09 DIAGNOSIS — R63.8 UNABLE TO EAT: ICD-10-CM

## 2024-02-09 LAB
ADV 40+41 DNA STL QL NAA+NON-PROBE: NEGATIVE
ALBUMIN SERPL BCG-MCNC: 4.5 G/DL (ref 3.5–5.2)
ALBUMIN UR-MCNC: 100 MG/DL
ALP SERPL-CCNC: 48 U/L (ref 40–150)
ALT SERPL W P-5'-P-CCNC: 12 U/L (ref 0–50)
ANION GAP SERPL CALCULATED.3IONS-SCNC: 13 MMOL/L (ref 7–15)
APPEARANCE UR: ABNORMAL
AST SERPL W P-5'-P-CCNC: 23 U/L (ref 0–45)
ASTRO TYP 1-8 RNA STL QL NAA+NON-PROBE: NEGATIVE
ATRIAL RATE - MUSE: 83 BPM
BASOPHILS # BLD AUTO: 0 10E3/UL (ref 0–0.2)
BASOPHILS NFR BLD AUTO: 0 %
BILIRUB SERPL-MCNC: 0.3 MG/DL
BILIRUB UR QL STRIP: ABNORMAL
BUN SERPL-MCNC: 23.5 MG/DL (ref 6–20)
C CAYETANENSIS DNA STL QL NAA+NON-PROBE: NEGATIVE
C DIFF TOX B STL QL: NEGATIVE
CALCIUM SERPL-MCNC: 9.1 MG/DL (ref 8.6–10)
CAMPYLOBACTER DNA SPEC NAA+PROBE: NEGATIVE
CHLORIDE SERPL-SCNC: 106 MMOL/L (ref 98–107)
COLOR UR AUTO: YELLOW
CREAT SERPL-MCNC: 2 MG/DL (ref 0.51–0.95)
CRYPTOSP DNA STL QL NAA+NON-PROBE: NEGATIVE
DEPRECATED HCO3 PLAS-SCNC: 20 MMOL/L (ref 22–29)
DIASTOLIC BLOOD PRESSURE - MUSE: NORMAL MMHG
E COLI O157 DNA STL QL NAA+NON-PROBE: ABNORMAL
E HISTOLYT DNA STL QL NAA+NON-PROBE: NEGATIVE
EAEC ASTA GENE ISLT QL NAA+PROBE: NEGATIVE
EC STX1+STX2 GENES STL QL NAA+NON-PROBE: NEGATIVE
EGFRCR SERPLBLD CKD-EPI 2021: 31 ML/MIN/1.73M2
EOSINOPHIL # BLD AUTO: 0 10E3/UL (ref 0–0.7)
EOSINOPHIL NFR BLD AUTO: 0 %
EPEC EAE GENE STL QL NAA+NON-PROBE: NEGATIVE
ERYTHROCYTE [DISTWIDTH] IN BLOOD BY AUTOMATED COUNT: 12.3 % (ref 10–15)
ETEC LTA+ST1A+ST1B TOX ST NAA+NON-PROBE: NEGATIVE
G LAMBLIA DNA STL QL NAA+NON-PROBE: NEGATIVE
GLUCOSE SERPL-MCNC: 102 MG/DL (ref 70–99)
GLUCOSE UR STRIP-MCNC: NEGATIVE MG/DL
HCG INTACT+B SERPL-ACNC: <1 MIU/ML
HCT VFR BLD AUTO: 45.5 % (ref 35–47)
HGB BLD-MCNC: 15.2 G/DL (ref 11.7–15.7)
HGB UR QL STRIP: ABNORMAL
HYALINE CASTS: 6 /LPF
IMM GRANULOCYTES # BLD: 0 10E3/UL
IMM GRANULOCYTES NFR BLD: 0 %
INTERPRETATION ECG - MUSE: NORMAL
KETONES UR STRIP-MCNC: 20 MG/DL
LEUKOCYTE ESTERASE UR QL STRIP: NEGATIVE
LIPASE SERPL-CCNC: 62 U/L (ref 13–60)
LYMPHOCYTES # BLD AUTO: 0.9 10E3/UL (ref 0.8–5.3)
LYMPHOCYTES NFR BLD AUTO: 8 %
MAGNESIUM SERPL-MCNC: 2.1 MG/DL (ref 1.7–2.3)
MCH RBC QN AUTO: 28.3 PG (ref 26.5–33)
MCHC RBC AUTO-ENTMCNC: 33.4 G/DL (ref 31.5–36.5)
MCV RBC AUTO: 85 FL (ref 78–100)
MONOCYTES # BLD AUTO: 1 10E3/UL (ref 0–1.3)
MONOCYTES NFR BLD AUTO: 9 %
MUCOUS THREADS #/AREA URNS LPF: PRESENT /LPF
NEUTROPHILS # BLD AUTO: 8.9 10E3/UL (ref 1.6–8.3)
NEUTROPHILS NFR BLD AUTO: 83 %
NITRATE UR QL: NEGATIVE
NOROVIRUS GI+II RNA STL QL NAA+NON-PROBE: NEGATIVE
NRBC # BLD AUTO: 0 10E3/UL
NRBC BLD AUTO-RTO: 0 /100
P AXIS - MUSE: 73 DEGREES
P SHIGELLOIDES DNA STL QL NAA+NON-PROBE: NEGATIVE
PH UR STRIP: 5.5 [PH] (ref 5–7)
PLATELET # BLD AUTO: 255 10E3/UL (ref 150–450)
POTASSIUM SERPL-SCNC: 4.4 MMOL/L (ref 3.4–5.3)
PR INTERVAL - MUSE: 134 MS
PROT SERPL-MCNC: 7 G/DL (ref 6.4–8.3)
QRS DURATION - MUSE: 78 MS
QT - MUSE: 372 MS
QTC - MUSE: 437 MS
R AXIS - MUSE: 54 DEGREES
RBC # BLD AUTO: 5.37 10E6/UL (ref 3.8–5.2)
RBC URINE: 1 /HPF
RVA RNA STL QL NAA+NON-PROBE: POSITIVE
SALMONELLA SP RPOD STL QL NAA+PROBE: NEGATIVE
SAPO I+II+IV+V RNA STL QL NAA+NON-PROBE: NEGATIVE
SHIGELLA SP+EIEC IPAH ST NAA+NON-PROBE: NEGATIVE
SODIUM SERPL-SCNC: 139 MMOL/L (ref 135–145)
SP GR UR STRIP: 1.03 (ref 1–1.03)
SQUAMOUS EPITHELIAL: 5 /HPF
SYSTOLIC BLOOD PRESSURE - MUSE: NORMAL MMHG
T AXIS - MUSE: 34 DEGREES
TSH SERPL DL<=0.005 MIU/L-ACNC: 1.37 UIU/ML (ref 0.3–4.2)
UROBILINOGEN UR STRIP-MCNC: 2 MG/DL
V CHOLERAE DNA SPEC QL NAA+PROBE: NEGATIVE
VENTRICULAR RATE- MUSE: 83 BPM
VIBRIO DNA SPEC NAA+PROBE: NEGATIVE
WBC # BLD AUTO: 10.8 10E3/UL (ref 4–11)
WBC URINE: 2 /HPF
Y ENTEROCOL DNA STL QL NAA+PROBE: NEGATIVE

## 2024-02-09 PROCEDURE — 85041 AUTOMATED RBC COUNT: CPT | Performed by: EMERGENCY MEDICINE

## 2024-02-09 PROCEDURE — 250N000013 HC RX MED GY IP 250 OP 250 PS 637: Performed by: EMERGENCY MEDICINE

## 2024-02-09 PROCEDURE — 76830 TRANSVAGINAL US NON-OB: CPT

## 2024-02-09 PROCEDURE — 99285 EMERGENCY DEPT VISIT HI MDM: CPT | Mod: 25 | Performed by: EMERGENCY MEDICINE

## 2024-02-09 PROCEDURE — 99223 1ST HOSP IP/OBS HIGH 75: CPT | Mod: GC | Performed by: INTERNAL MEDICINE

## 2024-02-09 PROCEDURE — 250N000011 HC RX IP 250 OP 636: Performed by: EMERGENCY MEDICINE

## 2024-02-09 PROCEDURE — 87507 IADNA-DNA/RNA PROBE TQ 12-25: CPT | Performed by: EMERGENCY MEDICINE

## 2024-02-09 PROCEDURE — 258N000003 HC RX IP 258 OP 636

## 2024-02-09 PROCEDURE — 93976 VASCULAR STUDY: CPT | Mod: 26 | Performed by: STUDENT IN AN ORGANIZED HEALTH CARE EDUCATION/TRAINING PROGRAM

## 2024-02-09 PROCEDURE — 82040 ASSAY OF SERUM ALBUMIN: CPT | Performed by: EMERGENCY MEDICINE

## 2024-02-09 PROCEDURE — 93005 ELECTROCARDIOGRAM TRACING: CPT | Performed by: EMERGENCY MEDICINE

## 2024-02-09 PROCEDURE — 120N000011 HC R&B TRANSPLANT UMMC

## 2024-02-09 PROCEDURE — 36415 COLL VENOUS BLD VENIPUNCTURE: CPT | Performed by: EMERGENCY MEDICINE

## 2024-02-09 PROCEDURE — 36415 COLL VENOUS BLD VENIPUNCTURE: CPT

## 2024-02-09 PROCEDURE — 87493 C DIFF AMPLIFIED PROBE: CPT | Performed by: EMERGENCY MEDICINE

## 2024-02-09 PROCEDURE — 76776 US EXAM K TRANSPL W/DOPPLER: CPT

## 2024-02-09 PROCEDURE — 80180 DRUG SCRN QUAN MYCOPHENOLATE: CPT

## 2024-02-09 PROCEDURE — 96361 HYDRATE IV INFUSION ADD-ON: CPT | Performed by: EMERGENCY MEDICINE

## 2024-02-09 PROCEDURE — 83735 ASSAY OF MAGNESIUM: CPT | Performed by: EMERGENCY MEDICINE

## 2024-02-09 PROCEDURE — 96374 THER/PROPH/DIAG INJ IV PUSH: CPT | Performed by: EMERGENCY MEDICINE

## 2024-02-09 PROCEDURE — 84702 CHORIONIC GONADOTROPIN TEST: CPT | Performed by: EMERGENCY MEDICINE

## 2024-02-09 PROCEDURE — 83690 ASSAY OF LIPASE: CPT | Performed by: EMERGENCY MEDICINE

## 2024-02-09 PROCEDURE — 81001 URINALYSIS AUTO W/SCOPE: CPT | Performed by: EMERGENCY MEDICINE

## 2024-02-09 PROCEDURE — 99285 EMERGENCY DEPT VISIT HI MDM: CPT | Performed by: EMERGENCY MEDICINE

## 2024-02-09 PROCEDURE — 74176 CT ABD & PELVIS W/O CONTRAST: CPT

## 2024-02-09 PROCEDURE — 250N000013 HC RX MED GY IP 250 OP 250 PS 637

## 2024-02-09 PROCEDURE — 84443 ASSAY THYROID STIM HORMONE: CPT | Performed by: EMERGENCY MEDICINE

## 2024-02-09 PROCEDURE — 76830 TRANSVAGINAL US NON-OB: CPT | Mod: 26 | Performed by: STUDENT IN AN ORGANIZED HEALTH CARE EDUCATION/TRAINING PROGRAM

## 2024-02-09 PROCEDURE — 258N000003 HC RX IP 258 OP 636: Performed by: EMERGENCY MEDICINE

## 2024-02-09 PROCEDURE — 74176 CT ABD & PELVIS W/O CONTRAST: CPT | Mod: 26 | Performed by: RADIOLOGY

## 2024-02-09 PROCEDURE — 250N000012 HC RX MED GY IP 250 OP 636 PS 637

## 2024-02-09 PROCEDURE — 76776 US EXAM K TRANSPL W/DOPPLER: CPT | Mod: 26 | Performed by: STUDENT IN AN ORGANIZED HEALTH CARE EDUCATION/TRAINING PROGRAM

## 2024-02-09 PROCEDURE — 76856 US EXAM PELVIC COMPLETE: CPT

## 2024-02-09 PROCEDURE — 80197 ASSAY OF TACROLIMUS: CPT

## 2024-02-09 PROCEDURE — 250N000011 HC RX IP 250 OP 636

## 2024-02-09 PROCEDURE — 76856 US EXAM PELVIC COMPLETE: CPT | Mod: 26 | Performed by: STUDENT IN AN ORGANIZED HEALTH CARE EDUCATION/TRAINING PROGRAM

## 2024-02-09 RX ORDER — CALCIUM CARBONATE 500 MG/1
1000 TABLET, CHEWABLE ORAL 4 TIMES DAILY PRN
Status: DISCONTINUED | OUTPATIENT
Start: 2024-02-09 | End: 2024-02-11 | Stop reason: HOSPADM

## 2024-02-09 RX ORDER — MYCOPHENOLIC ACID 180 MG/1
180 TABLET, DELAYED RELEASE ORAL ONCE
Status: COMPLETED | OUTPATIENT
Start: 2024-02-09 | End: 2024-02-09

## 2024-02-09 RX ORDER — AMOXICILLIN 250 MG
1 CAPSULE ORAL 2 TIMES DAILY PRN
Status: DISCONTINUED | OUTPATIENT
Start: 2024-02-09 | End: 2024-02-11 | Stop reason: HOSPADM

## 2024-02-09 RX ORDER — MULTIVITAMIN,THERAPEUTIC
1 TABLET ORAL DAILY
Status: DISCONTINUED | OUTPATIENT
Start: 2024-02-09 | End: 2024-02-11 | Stop reason: HOSPADM

## 2024-02-09 RX ORDER — LISINOPRIL 2.5 MG/1
2.5 TABLET ORAL DAILY
Status: DISCONTINUED | OUTPATIENT
Start: 2024-02-09 | End: 2024-02-11 | Stop reason: HOSPADM

## 2024-02-09 RX ORDER — TACROLIMUS 1 MG/1
2 CAPSULE ORAL DAILY
Status: DISCONTINUED | OUTPATIENT
Start: 2024-02-10 | End: 2024-02-11 | Stop reason: HOSPADM

## 2024-02-09 RX ORDER — ONDANSETRON 2 MG/ML
4 INJECTION INTRAMUSCULAR; INTRAVENOUS EVERY 6 HOURS PRN
Status: DISCONTINUED | OUTPATIENT
Start: 2024-02-09 | End: 2024-02-11 | Stop reason: HOSPADM

## 2024-02-09 RX ORDER — TACROLIMUS 0.5 MG/1
2.5 CAPSULE ORAL EVERY EVENING
Status: DISCONTINUED | OUTPATIENT
Start: 2024-02-09 | End: 2024-02-09

## 2024-02-09 RX ORDER — MYCOPHENOLIC ACID 180 MG/1
180 TABLET, DELAYED RELEASE ORAL DAILY
Status: DISCONTINUED | OUTPATIENT
Start: 2024-02-10 | End: 2024-02-11 | Stop reason: HOSPADM

## 2024-02-09 RX ORDER — ONDANSETRON 4 MG/1
4 TABLET, ORALLY DISINTEGRATING ORAL EVERY 6 HOURS PRN
Status: DISCONTINUED | OUTPATIENT
Start: 2024-02-09 | End: 2024-02-11 | Stop reason: HOSPADM

## 2024-02-09 RX ORDER — MYCOPHENOLIC ACID 360 MG/1
360 TABLET, DELAYED RELEASE ORAL EVERY EVENING
Status: DISCONTINUED | OUTPATIENT
Start: 2024-02-09 | End: 2024-02-11 | Stop reason: HOSPADM

## 2024-02-09 RX ORDER — PROCHLORPERAZINE MALEATE 5 MG
10 TABLET ORAL EVERY 6 HOURS PRN
Status: DISCONTINUED | OUTPATIENT
Start: 2024-02-09 | End: 2024-02-11 | Stop reason: HOSPADM

## 2024-02-09 RX ORDER — SODIUM CHLORIDE, SODIUM LACTATE, POTASSIUM CHLORIDE, CALCIUM CHLORIDE 600; 310; 30; 20 MG/100ML; MG/100ML; MG/100ML; MG/100ML
INJECTION, SOLUTION INTRAVENOUS CONTINUOUS
Status: ACTIVE | OUTPATIENT
Start: 2024-02-09 | End: 2024-02-10

## 2024-02-09 RX ORDER — ASPIRIN 81 MG/1
81 TABLET ORAL DAILY
Status: DISCONTINUED | OUTPATIENT
Start: 2024-02-09 | End: 2024-02-11 | Stop reason: HOSPADM

## 2024-02-09 RX ORDER — LIDOCAINE 40 MG/G
CREAM TOPICAL
Status: DISCONTINUED | OUTPATIENT
Start: 2024-02-09 | End: 2024-02-11 | Stop reason: HOSPADM

## 2024-02-09 RX ORDER — DIPHENHYDRAMINE HYDROCHLORIDE 25 MG/1
1 TABLET ORAL DAILY
Status: DISCONTINUED | OUTPATIENT
Start: 2024-02-09 | End: 2024-02-09

## 2024-02-09 RX ORDER — TACROLIMUS 0.5 MG/1
2 CAPSULE ORAL DAILY
Status: DISCONTINUED | OUTPATIENT
Start: 2024-02-10 | End: 2024-02-09

## 2024-02-09 RX ORDER — PROCHLORPERAZINE 25 MG
25 SUPPOSITORY, RECTAL RECTAL EVERY 12 HOURS PRN
Status: DISCONTINUED | OUTPATIENT
Start: 2024-02-09 | End: 2024-02-11 | Stop reason: HOSPADM

## 2024-02-09 RX ORDER — MYCOPHENOLIC ACID 360 MG/1
360 TABLET, DELAYED RELEASE ORAL EVERY MORNING
Status: DISCONTINUED | OUTPATIENT
Start: 2024-02-10 | End: 2024-02-11 | Stop reason: HOSPADM

## 2024-02-09 RX ORDER — AMOXICILLIN 250 MG
2 CAPSULE ORAL 2 TIMES DAILY PRN
Status: DISCONTINUED | OUTPATIENT
Start: 2024-02-09 | End: 2024-02-11 | Stop reason: HOSPADM

## 2024-02-09 RX ORDER — ONDANSETRON 2 MG/ML
4 INJECTION INTRAMUSCULAR; INTRAVENOUS EVERY 30 MIN PRN
Status: DISCONTINUED | OUTPATIENT
Start: 2024-02-09 | End: 2024-02-11

## 2024-02-09 RX ORDER — PANTOPRAZOLE SODIUM 40 MG/1
40 TABLET, DELAYED RELEASE ORAL DAILY
Status: DISCONTINUED | OUTPATIENT
Start: 2024-02-09 | End: 2024-02-11 | Stop reason: HOSPADM

## 2024-02-09 RX ORDER — TACROLIMUS 1 MG/1
2 CAPSULE ORAL DAILY
Status: DISCONTINUED | OUTPATIENT
Start: 2024-02-10 | End: 2024-02-09

## 2024-02-09 RX ORDER — HYOSCYAMINE SULFATE 0.125 MG
125 TABLET ORAL ONCE
Status: COMPLETED | OUTPATIENT
Start: 2024-02-09 | End: 2024-02-09

## 2024-02-09 RX ORDER — HEPARIN SODIUM 5000 [USP'U]/.5ML
5000 INJECTION, SOLUTION INTRAVENOUS; SUBCUTANEOUS EVERY 8 HOURS
Status: DISCONTINUED | OUTPATIENT
Start: 2024-02-09 | End: 2024-02-09

## 2024-02-09 RX ORDER — ALBUTEROL SULFATE 90 UG/1
2 AEROSOL, METERED RESPIRATORY (INHALATION) EVERY 4 HOURS PRN
Status: DISCONTINUED | OUTPATIENT
Start: 2024-02-09 | End: 2024-02-11 | Stop reason: HOSPADM

## 2024-02-09 RX ADMIN — SODIUM CHLORIDE 1000 ML: 9 INJECTION, SOLUTION INTRAVENOUS at 13:24

## 2024-02-09 RX ADMIN — HEPARIN SODIUM 5000 UNITS: 5000 INJECTION, SOLUTION INTRAVENOUS; SUBCUTANEOUS at 17:15

## 2024-02-09 RX ADMIN — ONDANSETRON 4 MG: 2 INJECTION INTRAMUSCULAR; INTRAVENOUS at 13:24

## 2024-02-09 RX ADMIN — TACROLIMUS 2.5 MG: 1 CAPSULE ORAL at 20:59

## 2024-02-09 RX ADMIN — PANTOPRAZOLE SODIUM 40 MG: 40 TABLET, DELAYED RELEASE ORAL at 17:15

## 2024-02-09 RX ADMIN — ASPIRIN 81 MG: 81 TABLET ORAL at 17:15

## 2024-02-09 RX ADMIN — SODIUM CHLORIDE 1000 ML: 9 INJECTION, SOLUTION INTRAVENOUS at 10:53

## 2024-02-09 RX ADMIN — HYOSCYAMINE SULFATE 125 MCG: 0.12 TABLET ORAL at 11:23

## 2024-02-09 RX ADMIN — THERA TABS 1 TABLET: TAB at 17:16

## 2024-02-09 RX ADMIN — MYCOPHENILIC ACID 180 MG: 180 TABLET, DELAYED RELEASE ORAL at 17:15

## 2024-02-09 RX ADMIN — MYCOPHENILIC ACID 360 MG: 360 TABLET, DELAYED RELEASE ORAL at 21:00

## 2024-02-09 RX ADMIN — SODIUM CHLORIDE, POTASSIUM CHLORIDE, SODIUM LACTATE AND CALCIUM CHLORIDE: 600; 310; 30; 20 INJECTION, SOLUTION INTRAVENOUS at 18:11

## 2024-02-09 RX ADMIN — ONDANSETRON 4 MG: 4 TABLET, ORALLY DISINTEGRATING ORAL at 17:19

## 2024-02-09 ASSESSMENT — ACTIVITIES OF DAILY LIVING (ADL)
ADLS_ACUITY_SCORE: 35

## 2024-02-09 NOTE — TELEPHONE ENCOUNTER
RNCC spoke with Daly. She gave detailed report on how her gastrointestinal illness has transpired over the week.     -Tues 2/6/24, about 3 am she started having vomiting and severe watery diarrhea for 20 hours, couldn't keep even water down to hydrate. 100.5 fever last for about an 1 hr.    -Wed 2/7/24 felt a bit better, able to keep meds down, diarrhea passed/loose, ate dried cereal. Afebrile.    -Thurs 2/8/24, more energy and kept meds down. Ate first real meal of Chicken noodle soup and immediately went back into N/V/D again. Vomited up her 9 pm meds w/in 10 min  Urine clear or pale yellow although not making much right now.     -Fri 2/9/24, she can not go without 30 minutes before diarrhea episode onset. Her  is with her and will drive her to the [facility]. Discussed possible dehydration/PATRICIA (IV fluid bolus), potential electrolyte/K imbalance w/vomiting/diarrhea (assess need for IV replacement), antiemetics to help keep anti-rejection meds down.     Chart reviewed. Recently established care with [facility] transplant center after moving from out of [facility]. She is nearly 3 years post kidney transplant. Seen transplant nephrologist, Dr. Rachna King (pager [phone]) in October 2023. Baseline Cr 1.4-1.6. Labs completed on 2/3/24, Cr 1.62. CBC WNL. Tac level 6.2 at goal 4-6.     Call placed to [place] ED with handoff report.     Nelida Stout, RN, BSN (covering for Hilda Moreno, RNCC)  Solid Organ Transplant, Post Kidney and Pancreas  Transplant Care Coordinator  [phone]

## 2024-02-09 NOTE — ED TRIAGE NOTES
Patient presents to triage for evaluation of nausea and vomiting. Patient has hx of kidney tx 3 years ago. Patient has been having increasing nausea, vomiting, and diarrhea since Tuesday. Patient states she has been having several bouts of vomiting and diarrhea since Tuesday, has not been able to keep any food down. Patient is most concerned about anti rejection meds as she has been able to take them but usually has episodes of vomiting shortly after.      Triage Assessment (Adult)       Row Name 02/09/24 1006          Triage Assessment    Airway WDL WDL        Respiratory WDL    Respiratory WDL WDL        Skin Circulation/Temperature WDL    Skin Circulation/Temperature WDL WDL        Cardiac WDL    Cardiac WDL WDL        Peripheral/Neurovascular WDL    Peripheral Neurovascular WDL WDL        Cognitive/Neuro/Behavioral WDL    Cognitive/Neuro/Behavioral WDL WDL

## 2024-02-09 NOTE — ED PROVIDER NOTES
History     Chief Complaint   Patient presents with    Nausea, Vomiting, & Diarrhea     HPI  Daly Seaman is a 43 year old female with a past medical history of hypertension, thyroid disease, kidney transplant.  22-year-old G1 (at [facility]) who presents to the emergency department with a chief complaint of nausea, vomiting, diarrhea.  The patient states her symptoms started on Tuesday.  She has had several episodes of vomiting and diarrhea since then and has not been able to keep any food down.  The patient has been able to take her antirejection medications, but has been having vomiting episodes shortly thereafter (approximately 45 minutes per patient report).  She has had minimal food intake since Tuesday, only a small amount of dry cereal.  She has been able to tolerate some liquids, but it has been difficult.  She is still having urine output, but it is significantly decreased.  Her stool output has decreased as well, she is now only passing small amounts of mucus.  She has severe abdominal pain also, primarily in the middle, but also throughout the lower abdomen.  Her transplanted kidney is on the right.    Patient presents to the emergency department today accompanied by her .  He notes that her symptoms significantly worsened again after seemingly having improved after she tried to eat some chicken noodle soup.      I have reviewed the Medications, Allergies, Past Medical and Surgical History, and Social History in the [email or web address] system.    Past Medical History:   Diagnosis Date    History of blood transfusion April 2021    History of colposcopy 12/2022 1/2024    Hypertension     variable, based on health history    Thyroid disease     On watch, hypothyroid     Past Surgical History:   Procedure Laterality Date    ABDOMEN SURGERY  April 2021    IR THYROID BIOPSY  02/04/2020    IR THYROID BIOPSY  09/15/2015    TRANSPLANT  April 2012    kidney     No current facility-administered  medications for this encounter.     Current Outpatient Medications   Medication    albuterol (PROAIR HFA/PROVENTIL HFA/VENTOLIN HFA) 108 (90 Base) MCG/ACT inhaler    aspirin 81 MG EC tablet    biotin 5 MG CAPS    levonorgestrel (MIRENA) 52 MG (20 mcg/day) IUD    lisinopril (ZESTRIL) 2.5 MG tablet    Multiple Vitamins-Minerals (ONCOVITE) TABS    mycophenolic acid (GENERIC EQUIVALENT) 180 MG EC tablet    pantoprazole (PROTONIX) 40 MG EC tablet    tacrolimus (GENERIC) 0.5 MG capsule    tacrolimus (GENERIC) 1 MG capsule    vitamin D2 (ERGOCALCIFEROL) 82329 units (1250 mcg) capsule     No Known Allergies  Past medical history, past surgical history, medications, and allergies were reviewed with the patient. Additional pertinent items: None    Social History     Socioeconomic History    Marital status:      Spouse name: Not on file    Number of children: Not on file    Years of education: Not on file    Highest education level: Not on file   Occupational History    Not on file   Tobacco Use    Smoking status: Never    Smokeless tobacco: Never   Substance and Sexual Activity    Alcohol use: Never    Drug use: Never    Sexual activity: Yes     Partners: Male     Birth control/protection: I.U.D.   Other Topics Concern    Parent/sibling w/ CABG, MI or angioplasty before 65F 55M? No   Social History Narrative    Not on file     Social Determinants of Health     Financial Resource Strain: Low Risk  (11/10/2023)    Financial Resource Strain     Within the past 12 months, have you or your family members you live with been unable to get utilities (heat, electricity) when it was really needed?: No   Food Insecurity: Low Risk  (11/10/2023)    Food Insecurity     Within the past 12 months, did you worry that your food would run out before you got money to buy more?: No     Within the past 12 months, did the food you bought just not last and you didn t have money to get more?: No   Transportation Needs: Low Risk  (11/10/2023)     Transportation Needs     Within the past 12 months, has lack of transportation kept you from medical appointments, getting your medicines, non-medical meetings or appointments, work, or from getting things that you need?: No   Physical Activity: Not on file   Stress: Not on file   Social Connections: Not on file   Interpersonal Safety: Not on file   Housing Stability: Low Risk  (11/10/2023)    Housing Stability     Do you have housing? : Yes     Are you worried about losing your housing?: No     Social history was reviewed with the patient. Additional pertinent items: None    Review of Systems  A medically appropriate review of systems was performed with pertinent positives and negatives noted in the HPI, and all other systems negative.    Physical Exam   BP: 107/72  Pulse: 120  Temp: 98.3  F (36.8  C)  Resp: 17  SpO2: 99 %      General: Well nourished, well developed, NAD  HEENT: EOMI, anicteric. NCAT, MMM  Neck: no jugular venous distension, supple, nl ROM  Cardiac: Regular rate and rhythm. No murmurs, rubs, or gallops. Normal S1, S2.  Intact peripheral pulses  Pulm: CTAB, no stridor, wheezes, rales, rhonchi  Abd: Soft, nontender, nondistended.  No masses palpated.    Skin: Warm and dry to the touch.  No rash  Extremities: No LE edema, no cyanosis, w/w/p  Neuro: A&Ox3, no gross focal deficits    ED Course        Procedures                        Labs Ordered and Resulted from Time of ED Arrival to Time of ED Departure   COMPREHENSIVE METABOLIC PANEL - Abnormal       Result Value    Sodium 139      Potassium 4.4      Carbon Dioxide (CO2) 20 (*)     Anion Gap 13      Urea Nitrogen 23.5 (*)     Creatinine 2.00 (*)     GFR Estimate 31 (*)     Calcium 9.1      Chloride 106      Glucose 102 (*)     Alkaline Phosphatase 48      AST 23      ALT 12      Protein Total 7.0      Albumin 4.5      Bilirubin Total 0.3     LIPASE - Abnormal    Lipase 62 (*)    ROUTINE UA WITH MICROSCOPIC REFLEX TO CULTURE - Abnormal    Color  Urine Yellow      Appearance Urine Slightly Cloudy (*)     Glucose Urine Negative      Bilirubin Urine Small (*)     Ketones Urine 20 (*)     Specific Gravity Urine 1.030      Blood Urine Trace (*)     pH Urine 5.5      Protein Albumin Urine 100 (*)     Urobilinogen Urine 2.0      Nitrite Urine Negative      Leukocyte Esterase Urine Negative      Mucus Urine Present (*)     RBC Urine 1      WBC Urine 2      Squamous Epithelials Urine 5 (*)     Hyaline Casts Urine 6 (*)    CBC WITH PLATELETS AND DIFFERENTIAL - Abnormal    WBC Count 10.8      RBC Count 5.37 (*)     Hemoglobin 15.2      Hematocrit 45.5      MCV 85      MCH 28.3      MCHC 33.4      RDW 12.3      Platelet Count 255      % Neutrophils 83      % Lymphocytes 8      % Monocytes 9      % Eosinophils 0      % Basophils 0      % Immature Granulocytes 0      NRBCs per 100 WBC 0      Absolute Neutrophils 8.9 (*)     Absolute Lymphocytes 0.9      Absolute Monocytes 1.0      Absolute Eosinophils 0.0      Absolute Basophils 0.0      Absolute Immature Granulocytes 0.0      Absolute NRBCs 0.0     MAGNESIUM - Normal    Magnesium 2.1     HCG QUANTITATIVE PREGNANCY - Normal    hCG Quantitative <1     TSH WITH FREE T4 REFLEX - Normal    TSH 1.37     C. DIFFICILE TOXIN B PCR WITH REFLEX TO C. DIFFICILE ANTIGEN AND TOXINS A/B EIA   ENTERIC BACTERIA AND VIRUS PANEL BY PCR            Results for orders placed or performed during the hospital encounter of 02/09/24 (from the past 24 hour(s))   CBC with platelets differential    Narrative    The following orders were created for panel order CBC with platelets differential.  Procedure                               Abnormality         Status                     ---------                               -----------         ------                     CBC with platelets and d...[[id number]]  Abnormal            Final result                 Please view results for these tests on the individual orders.   Lipase   Result Value Ref Range     Lipase 62 (H) 13 - 60 U/L   Magnesium   Result Value Ref Range    Magnesium 2.1 1.7 - 2.3 mg/dL   HCG quantitative pregnancy   Result Value Ref Range    hCG Quantitative <1 <5 mIU/mL   TSH with free T4 reflex   Result Value Ref Range    TSH 1.37 0.30 - 4.20 uIU/mL   CBC with platelets and differential   Result Value Ref Range    WBC Count 10.8 4.0 - 11.0 10e3/uL    RBC Count 5.37 (H) 3.80 - 5.20 10e6/uL    Hemoglobin 15.2 11.7 - 15.7 g/dL    Hematocrit 45.5 35.0 - 47.0 %    MCV 85 78 - 100 fL    MCH 28.3 26.5 - 33.0 pg    MCHC 33.4 31.5 - 36.5 g/dL    RDW 12.3 10.0 - 15.0 %    Platelet Count 255 150 - 450 10e3/uL    % Neutrophils 83 %    % Lymphocytes 8 %    % Monocytes 9 %    % Eosinophils 0 %    % Basophils 0 %    % Immature Granulocytes 0 %    NRBCs per 100 WBC 0 <1 /100    Absolute Neutrophils 8.9 (H) 1.6 - 8.3 10e3/uL    Absolute Lymphocytes 0.9 0.8 - 5.3 10e3/uL    Absolute Monocytes 1.0 0.0 - 1.3 10e3/uL    Absolute Eosinophils 0.0 0.0 - 0.7 10e3/uL    Absolute Basophils 0.0 0.0 - 0.2 10e3/uL    Absolute Immature Granulocytes 0.0 <=0.4 10e3/uL    Absolute NRBCs 0.0 10e3/uL   US Renal Transplant with Doppler    Narrative    EXAMINATION: US RENAL TRANSPLANT,  2/9/2024 11:17 AM     COMPARISON: None.    HISTORY: RLQ pain    TECHNIQUE:  Grey-scale, color Doppler and spectral flow analysis.    FINDINGS:  The transplant kidney is located right lower quadrant, and measures  12.5 cm. Parenchyma is of normal thickness and echogenicity. No focal  lesions. No hydronephrosis. No perinephric fluid collection.    Renal artery flow:   63 cm/s cm/sec peak systolic at hilum.  36 cm/s peak systolic at anastomosis.  Arcuate artery resistive indices (upper to lower): 0.46, 0.6, 0.54    Renal Vein Flow:  13 cm/s at hilum.   119 cm/s at anastomosis.    Iliac artery flow:  127 cm/s peak systolic above anastomosis.  85 cm/s peak systolic below anastomosis.    Iliac vein flow:  Patent above and below the anastomosis.      Impression     IMPRESSION: Normal transplant kidney ultrasound.    I have personally reviewed the examination and initial interpretation  and I agree with the findings.    JOHNNA MISTRY DO         SYSTEM ID:  [id number]   CT Abdomen Pelvis w/o Contrast    Narrative    EXAMINATION: CT ABDOMEN PELVIS W/O CONTRAST, 2/9/2024 12:20 PM    INDICATION: abd pain    COMPARISON STUDY: None.    TECHNIQUE: CT scan of the abdomen and pelvis was performed on  multidetector CT scanner using volumetric acquisition technique and  images were reconstructed in multiple planes with variable thickness  and reviewed on dedicated workstations.     CONTRAST: None.    CT scan radiation dose is optimized to minimum requisite dose using  automated dose modulation techniques.    FINDINGS:    Lower thorax: No consolidations. Punctate calcified granuloma in the  right lung base.    Liver: No mass within the limitations of noncontrast technique. No  intrahepatic biliary ductal dilation.    Biliary System: Normal gallbladder. No extrahepatic biliary ductal  dilation.    Pancreas: No pancreatic ductal dilation.    Adrenal glands: No mass or nodules    Spleen: Normal. Small splenule.     Kidneys: Atrophic native kidneys. Normal appearance of right lower  quadrant transplant kidney. No obstructing calculus or hydronephrosis.    Gastrointestinal tract :Normal appendix. No dilation of small and  large bowel loops. 0.9 cm hyperdensity in the right upper quadrant is  likely ingested material within the duodenum.    Mesentery/peritoneum/retroperitoneum: No mass. No free fluid or air.    Lymph nodes: No significant lymphadenopathy.    Vasculature: Normal caliber aorta.    Pelvis: Urinary bladder is decompressed.  Enlarged bulky uterus.  Indeterminate location of the intrauterine device within the lower  uterine segment.    Osseous structures: No aggressive or acute osseous lesion.      Soft tissues: Within normal limits.      Impression    IMPRESSION:   1. No acute  findings in the abdomen or pelvis.  2. Bulky enlarged uterus with indeterminate position of the IUD.  Recommend further assessment with pelvic ultrasound.    EMILY ESCOBEDO MD         SYSTEM ID:  [id number]   Comprehensive metabolic panel   Result Value Ref Range    Sodium 139 135 - 145 mmol/L    Potassium 4.4 3.4 - 5.3 mmol/L    Carbon Dioxide (CO2) 20 (L) 22 - 29 mmol/L    Anion Gap 13 7 - 15 mmol/L    Urea Nitrogen 23.5 (H) 6.0 - 20.0 mg/dL    Creatinine 2.00 (H) 0.51 - 0.95 mg/dL    GFR Estimate 31 (L) >60 mL/min/1.73m2    Calcium 9.1 8.6 - 10.0 mg/dL    Chloride 106 98 - 107 mmol/L    Glucose 102 (H) 70 - 99 mg/dL    Alkaline Phosphatase 48 40 - 150 U/L    AST 23 0 - 45 U/L    ALT 12 0 - 50 U/L    Protein Total 7.0 6.4 - 8.3 g/dL    Albumin 4.5 3.5 - 5.2 g/dL    Bilirubin Total 0.3 <=1.2 mg/dL   UA with Microscopic reflex to Culture    Specimen: Urine, Clean Catch   Result Value Ref Range    Color Urine Yellow Colorless, Straw, Light Yellow, Yellow    Appearance Urine Slightly Cloudy (A) Clear    Glucose Urine Negative Negative mg/dL    Bilirubin Urine Small (A) Negative    Ketones Urine 20 (A) Negative mg/dL    Specific Gravity Urine 1.030 1.003 - 1.035    Blood Urine Trace (A) Negative    pH Urine 5.5 5.0 - 7.0    Protein Albumin Urine 100 (A) Negative mg/dL    Urobilinogen Urine 2.0 Normal, 2.0 mg/dL    Nitrite Urine Negative Negative    Leukocyte Esterase Urine Negative Negative    Mucus Urine Present (A) None Seen /LPF    RBC Urine 1 <=2 /HPF    WBC Urine 2 <=5 /HPF    Squamous Epithelials Urine 5 (H) <=1 /HPF    Hyaline Casts Urine 6 (H) <=2 /LPF    Narrative    Urine Culture not indicated       Labs, vital signs, and imaging studies were reviewed by me.    Medications   ondansetron (ZOFRAN) injection 4 mg (4 mg Intravenous $Given 2/9/24 1324)   sodium chloride 0.9% BOLUS 1,000 mL (1,000 mLs Intravenous $New Bag 2/9/24 1324)   sodium chloride 0.9% BOLUS 1,000 mL (1,000 mLs Intravenous $New Bag 2/9/24 5662)    hyoscyamine (LEVSIN) tablet 125 mcg (125 mcg Oral $Given 2/9/24 1124)       Assessments & Plan (with Medical Decision Making)   Daly Seaman is a 43 year old female who presents with nausea and vomiting.  Differential diagnosis includes gastritis/gastroenteritis, pancreatitis, urinary tract infection, pregnancy, COVID-19 infection, other viral syndrome.  Labs were ordered to further evaluate the patient and evaluate for concurrent dehydration/electrolyte abnormality.  Of particular concern is that patient has had difficulty tolerating her antirejection medications due to vomiting shortly thereafter.  Antiemetics and fluids were ordered for the patient as well.  Stool studies were ordered, but patient uncertain if she will be able to provide a sample due to her minimal p.o. intake recently.    Laboratory workup is remarkable for normal TSH, negative pregnancy testing, magnesium within normal limits, lipase 62, CBC unremarkable, CMP shows PATRICIA.  Urinalysis, enteric pathogen panel/C. difficile testing were collected.    Renal ultrasound is normal.    Urinalysis shows ketones and protein, consistent with dehydration, does not appear consistent with UTI    CT shows no acute findings, however, patient does have bulky enlarged uterus and pelvic ultrasound is recommended for further evaluation.  This has been ordered as well.    After nausea medications were administered in the emergency department, the patient complains of ongoing nausea, additional medications were given.    Critical care was not performed.     Medical Decision Making  The patient's presentation was of high complexity (an acute health issue posing potential threat to life or bodily function).    The patient's evaluation involved:  ordering and/or review of 3+ test(s) in this encounter (see separate area of note for details)  independent interpretation of testing performed by another health professional (see separate area of note for  details)  discussion of management or test interpretation with another health professional (see separate area of note for details)    The patient's management necessitated moderate risk (prescription drug management including medications given in the ED) and high risk (a decision regarding hospitalization).    I have reviewed the nursing notes.    I have reviewed the findings, diagnosis, plan and need for follow up with the patient.    Patient and their further management were discussed with internal medicine, to be admitted to their service. Plan was discussed with patient who understands and agrees with plan.    New Prescriptions    No medications on file       Final diagnoses:   Nausea and vomiting, unspecified vomiting type   Acute kidney injury (H24)   Unable to eat       AREN FLETCHER MD  2/9/2024   [facility] EMERGENCY DEPARTMENT       Aren Fletcher MD  02/09/24 1400

## 2024-02-09 NOTE — DISCHARGE INSTRUCTIONS
Please take 2 mg BID for tacro until told otherwise  Start bicarb 650 twice a day  Repeat labs early  next week mon/tues w/ BPM, tacrolimus level  Continue to hold lisinopril  Follow up with obgyn regarding IUD

## 2024-02-09 NOTE — H&P
[facility]    History and Physical - Medicine Service, [facility] TEAM 2       Date of Admission:  2/9/2024    Assessment & Plan      Daly Seaman is a 43 year old female admitted on 2/9/2024. She has a history of IgA nephropathy status post renal transplant in 2021 on tacrolimus and mycophenolate who was admitted with nausea, vomiting, diarrhea, and inability to tolerate oral intake.  On admission, found to have prerenal PATRICIA and tested positive for rotavirus.    # Prerenal PATRICIA  # Nausea, vomiting, diarrhea  # Rotavirus   # IgA nephropathy status post renal transplant (2021)  # Inability to tolerate PO   Baseline creatinine 1.5-1.6.  On admission, creatinine 2.0 and BUN 24.  CT abdomen/pelvis obtained in emergency department and negative for acute findings.  Enteric panel positive for rotavirus.  Admission for supportive cares, IV fluid resuscitation, and management of nausea and vomiting (especially important in setting of immunosuppression medications)  -- Status post 2 L IV fluids in ED  - Start maintenance IV fluids at 100 ml/hr   -Transplant nephrology consult  -Check tacro and mycophenolate levels  -Repeat BMP in a.m.  -Continue PTA tacrolimus and mycophenolate  -Antiemetic order set  - holding PTA ACEi    # Large uterine fibroid measuring up to 9.3 cm  # Possibly malpositioned IUD  -Incidental finding noted on CT scan and thus follow-up pelvic ultrasound was completed with results as above.  Asymptomatic, no pelvic pain.  Right ovarian cyst also noted, no concern for ovarian torsion.  Patient has an OB/GYN that she already follows with, plan for follow-up with them on discharge  -OB/GYN follow-up on discharge        Diet: Advance Diet as Tolerated: Clear Liquid DietADAT   DVT Prophylaxis: Low Risk/Ambulatory with no VTE prophylaxis indicated  Avila Catheter: Not present  Fluids: mIVF @ 100 ml/hr  Lines: None     Cardiac Monitoring: None  Code Status: Full Code       Clinically Significant Risk Factors Present on Admission                # Drug Induced Platelet Defect: home medication list includes an antiplatelet medication   # Hypertension: Home medication list includes antihypertensive(s)                 Disposition Plan      Expected Discharge Date: 02/11/2024                The patient's care was discussed with the Attending Physician, Dr. Enriquez .      Estefani Lara MD  PGY-1 Internal Medicine      Chief Complaint   Vomiting, diarrhea     History is obtained from the patient    History of Present Illness   Daly Seaman is a 43 year old female who presents with 4 days of nausea, vomiting, diarrhea.  Symptoms initially improved a little bit on Wednesday, however yesterday evening and throughout this morning was continuously vomiting.  She was concerned because she was unable to keep down her immunosuppression drugs.  No fevers.  Mild centralized abdominal pain.  Still urinating but noticed decreasing urine output.  Has been unable to take in any food whatsoever, tried chicken noodle soup 1 day without success, vomited shortly after.  Meds but has still been trying to take her immunosuppression earlier today vomited about an hour after taking it.    Past Medical History    Past Medical History:   Diagnosis Date    History of blood transfusion April 2021    History of colposcopy 12/2022 1/2024    Hypertension     variable, based on health history    Thyroid disease     On watch, hypothyroid       Past Surgical History   Past Surgical History:   Procedure Laterality Date    ABDOMEN SURGERY  April 2021    IR THYROID BIOPSY  02/04/2020    IR THYROID BIOPSY  09/15/2015    TRANSPLANT  April 2012    kidney       Prior to Admission Medications   Prior to Admission Medications   Prescriptions Last Dose Informant Patient Reported? Taking?   Multiple Vitamins-Minerals (ONCOVITE) TABS   Yes No   Sig: Take 1 tablet by mouth daily   albuterol (PROAIR HFA/PROVENTIL  HFA/VENTOLIN HFA) 108 (90 Base) MCG/ACT inhaler   Yes No   Sig: Inhale 2 puffs into the lungs   aspirin 81 MG EC tablet   Yes No   Sig: Take 81 mg by mouth daily   biotin 5 MG CAPS   Yes No   Sig: Take 1 capsule by mouth   levonorgestrel (MIRENA) 52 MG (20 mcg/day) IUD   Yes No   Si each by Intrauterine route   lisinopril (ZESTRIL) 2.5 MG tablet   Yes No   Sig: Take 2.5 mg by mouth   mycophenolic acid (GENERIC EQUIVALENT) 180 MG EC tablet   Yes No   Sig: Take 360mg (2 tablets) in the morning, take 180mg (1 tablet) midday, and take 360mg (2 tablets) in the evening.   pantoprazole (PROTONIX) 40 MG EC tablet   Yes No   Sig: Take 1 tablet by mouth daily   tacrolimus (GENERIC) 0.5 MG capsule   No No   Sig: Take 1 capsule (0.5 mg) by mouth daily   tacrolimus (GENERIC) 1 MG capsule   No No   Sig: Take 2 capsules (2 mg) by mouth 2 times daily   vitamin D2 (ERGOCALCIFEROL) 21245 units (1250 mcg) capsule   Yes No   Sig: TAKE 1 CAPSULE BY MOUTH ONE TIME PER WEEK FOR 8 DOSES      Facility-Administered Medications: None        Physical Exam   Vital Signs: Temp: 97.9  F (36.6  C) Temp src: Oral BP: 128/76 Pulse: 77   Resp: 18 SpO2: 100 % O2 Device: None (Room air)    Weight: 0 lbs 0 oz    General Appearance: Sitting up in bed, no acute distress  Respiratory: Lungs clear to auscultate bilaterally, no wheezing or crackles  Cardiovascular: Regular rate and rhythm, no murmurs  GI: Soft, nondistended, nontender to palpation  Skin: Dry mucous membranes      Medical Decision Making             Data     I have personally reviewed the following data over the past 24 hrs:    10.8  \   15.2   / 255     139 106 23.5 (H) /  102 (H)   4.4 20 (L) 2.00 (H) \     ALT: 12 AST: 23 AP: 48 TBILI: 0.3   ALB: 4.5 TOT PROTEIN: 7.0 LIPASE: 62 (H)     TSH: 1.37 T4: N/A A1C: N/A       Imaging results reviewed over the past 24 hrs:   Recent Results (from the past 24 hour(s))   US Renal Transplant with Doppler    Narrative    EXAMINATION: US RENAL  TRANSPLANT,  2/9/2024 11:17 AM     COMPARISON: None.    HISTORY: RLQ pain    TECHNIQUE:  Grey-scale, color Doppler and spectral flow analysis.    FINDINGS:  The transplant kidney is located right lower quadrant, and measures  12.5 cm. Parenchyma is of normal thickness and echogenicity. No focal  lesions. No hydronephrosis. No perinephric fluid collection.    Renal artery flow:   63 cm/s cm/sec peak systolic at hilum.  36 cm/s peak systolic at anastomosis.  Arcuate artery resistive indices (upper to lower): 0.46, 0.6, 0.54    Renal Vein Flow:  13 cm/s at hilum.   119 cm/s at anastomosis.    Iliac artery flow:  127 cm/s peak systolic above anastomosis.  85 cm/s peak systolic below anastomosis.    Iliac vein flow:  Patent above and below the anastomosis.      Impression    IMPRESSION: Normal transplant kidney ultrasound.    I have personally reviewed the examination and initial interpretation  and I agree with the findings.    JOHNNA MISTRY DO         SYSTEM ID:  [id number]   CT Abdomen Pelvis w/o Contrast    Narrative    EXAMINATION: CT ABDOMEN PELVIS W/O CONTRAST, 2/9/2024 12:20 PM    INDICATION: abd pain    COMPARISON STUDY: None.    TECHNIQUE: CT scan of the abdomen and pelvis was performed on  multidetector CT scanner using volumetric acquisition technique and  images were reconstructed in multiple planes with variable thickness  and reviewed on dedicated workstations.     CONTRAST: None.    CT scan radiation dose is optimized to minimum requisite dose using  automated dose modulation techniques.    FINDINGS:    Lower thorax: No consolidations. Punctate calcified granuloma in the  right lung base.    Liver: No mass within the limitations of noncontrast technique. No  intrahepatic biliary ductal dilation.    Biliary System: Normal gallbladder. No extrahepatic biliary ductal  dilation.    Pancreas: No pancreatic ductal dilation.    Adrenal glands: No mass or nodules    Spleen: Normal. Small splenule.     Kidneys:  Atrophic native kidneys. Normal appearance of right lower  quadrant transplant kidney. No obstructing calculus or hydronephrosis.    Gastrointestinal tract :Normal appendix. No dilation of small and  large bowel loops. 0.9 cm hyperdensity in the right upper quadrant is  likely ingested material within the duodenum.    Mesentery/peritoneum/retroperitoneum: No mass. No free fluid or air.    Lymph nodes: No significant lymphadenopathy.    Vasculature: Normal caliber aorta.    Pelvis: Urinary bladder is decompressed.  Enlarged bulky uterus.  Indeterminate location of the intrauterine device within the lower  uterine segment.    Osseous structures: No aggressive or acute osseous lesion.      Soft tissues: Within normal limits.      Impression    IMPRESSION:   1. No acute findings in the abdomen or pelvis.  2. Bulky enlarged uterus with indeterminate position of the IUD.  Recommend further assessment with pelvic ultrasound.    EMILY ESCOBEDO MD         SYSTEM ID:  [id number]   US Pelvis Cmplt w Transvag & Doppler LmtPel Duplex Limited    Narrative    EXAMINATION: US PELVIS COMPLETE W TRANSVAGINAL AND DOPPLER LIMITED,  2/9/2024 2:22 PM     COMPARISON: CT abdomen 2/9/2024    HISTORY: pelvic pain    TECHNIQUE: The pelvis was scanned in standard fashion with  transabdominal and transvaginal transducer(s) using both grey scale  and spectral flow and  color Doppler techniques.    FINDINGS:    The uterus measures 14.2 x 7.5 x 8.7 cm. Large heterogenous mass  distorting the uterus measures about 9.3 x 7 x 7.8 cm, the endometrial  cavity is not well visualized, the intrauterine device is seen in the  lower uterine segment and is possibly malpositioned. No free fluid is  demonstrated.    The right ovary measures 5.7 x 3.4 x 3.0 cm.; Volume 30 mL, cyst with  internal echoes in the right ovary measures 3.3 x 2.8 x 1.7 cm.  The left ovary measures 4.8 x 2.2 x 3.4 cm. ; Volume 19 mL; prominent  follicle measuring 1.7 cm.; Bilateral  adnexal blood flow on color and  spectral Doppler.      Impression    IMPRESSION:     1. Large uterine echogenicity possibly fibroid measuring up to 9.3 cm,  endometrial cavity is not well visualized, possibly distorted with  mass effect;  intrauterine device apparently in the lower uterine  segment, possibly malpositioned, versus apparent displacement  secondary to mass effect from the fibroid, may consider pelvic 3D   ultrasound/MR for further evaluation as  clinically desired    2. Right ovarian cyst with internal echoes measuring up to 3.3 cm, may  consider attention on follow-up. Bilateral adnexal blood flow on color  and spectral Doppler, presence of blood flow does not necessarily rule  out partial/intermittent torsion, if concern for ovarian torsion  consider gynecology consult.  I have personally reviewed the examination and initial interpretation  and I agree with the findings.    FRIEDA MOSES MD         SYSTEM ID:  [id number]

## 2024-02-09 NOTE — TELEPHONE ENCOUNTER
Patient Call: General  Route to LPN    Reason for call: Dlay wanting to talk with Coordinator,  regarding some GI issues she's been having since Tuesday not able to keep anything down, not sure what to do she's crying and wanting to know if she should go to the ER.    Call back needed? Yes    Return Call Needed  Same as documented in contacts section  When to return call?: Same day: Route High Priority

## 2024-02-09 NOTE — PHARMACY-PHARMACOTHERAPY NOTE
"The following home medication was NOT continued on inpatient admission per \"Discontinuation of nonessential home medications during hospitalization\" policy: Biotin    If a therapeutic holiday is deemed inappropriate per the prescriber, please notify the pharmacist regarding the medication order.    The pharmacist is available to answer any questions and/or concerns the patient may have regarding discontinuation of non-essential medications.    Please ensure that these medications are restarted as needed upon discharge via the medication reconciliation discharge process and included on the discharge medication reconciliation report.    Thank you,  Chuckie Busby [facility]   "

## 2024-02-10 LAB
ANION GAP SERPL CALCULATED.3IONS-SCNC: 9 MMOL/L (ref 7–15)
BUN SERPL-MCNC: 19.3 MG/DL (ref 6–20)
C PNEUM DNA SPEC QL NAA+PROBE: NOT DETECTED
CALCIUM SERPL-MCNC: 8.6 MG/DL (ref 8.6–10)
CHLORIDE SERPL-SCNC: 109 MMOL/L (ref 98–107)
CREAT SERPL-MCNC: 1.84 MG/DL (ref 0.51–0.95)
DEPRECATED HCO3 PLAS-SCNC: 19 MMOL/L (ref 22–29)
EGFRCR SERPLBLD CKD-EPI 2021: 34 ML/MIN/1.73M2
ERYTHROCYTE [DISTWIDTH] IN BLOOD BY AUTOMATED COUNT: 12.4 % (ref 10–15)
ERYTHROCYTE [DISTWIDTH] IN BLOOD BY AUTOMATED COUNT: 12.4 % (ref 10–15)
FLUAV H1 2009 PAND RNA SPEC QL NAA+PROBE: NOT DETECTED
FLUAV H1 RNA SPEC QL NAA+PROBE: NOT DETECTED
FLUAV H3 RNA SPEC QL NAA+PROBE: NOT DETECTED
FLUAV RNA SPEC QL NAA+PROBE: NOT DETECTED
FLUBV RNA SPEC QL NAA+PROBE: NOT DETECTED
GLUCOSE SERPL-MCNC: 89 MG/DL (ref 70–99)
HADV DNA SPEC QL NAA+PROBE: NOT DETECTED
HCOV PNL SPEC NAA+PROBE: NOT DETECTED
HCT VFR BLD AUTO: 34.6 % (ref 35–47)
HCT VFR BLD AUTO: 38 % (ref 35–47)
HGB BLD-MCNC: 11.2 G/DL (ref 11.7–15.7)
HGB BLD-MCNC: 12.1 G/DL (ref 11.7–15.7)
HMPV RNA SPEC QL NAA+PROBE: NOT DETECTED
HOLD SPECIMEN: NORMAL
HPIV1 RNA SPEC QL NAA+PROBE: NOT DETECTED
HPIV2 RNA SPEC QL NAA+PROBE: NOT DETECTED
HPIV3 RNA SPEC QL NAA+PROBE: NOT DETECTED
HPIV4 RNA SPEC QL NAA+PROBE: NOT DETECTED
M PNEUMO DNA SPEC QL NAA+PROBE: NOT DETECTED
MCH RBC QN AUTO: 27.8 PG (ref 26.5–33)
MCH RBC QN AUTO: 28.3 PG (ref 26.5–33)
MCHC RBC AUTO-ENTMCNC: 31.8 G/DL (ref 31.5–36.5)
MCHC RBC AUTO-ENTMCNC: 32.4 G/DL (ref 31.5–36.5)
MCV RBC AUTO: 87 FL (ref 78–100)
MCV RBC AUTO: 87 FL (ref 78–100)
PLATELET # BLD AUTO: 209 10E3/UL (ref 150–450)
PLATELET # BLD AUTO: 223 10E3/UL (ref 150–450)
POTASSIUM SERPL-SCNC: 3.9 MMOL/L (ref 3.4–5.3)
RBC # BLD AUTO: 3.96 10E6/UL (ref 3.8–5.2)
RBC # BLD AUTO: 4.36 10E6/UL (ref 3.8–5.2)
RSV RNA SPEC QL NAA+PROBE: NOT DETECTED
RSV RNA SPEC QL NAA+PROBE: NOT DETECTED
RV+EV RNA SPEC QL NAA+PROBE: NOT DETECTED
SODIUM SERPL-SCNC: 137 MMOL/L (ref 135–145)
TACROLIMUS BLD-MCNC: 28.8 UG/L (ref 5–15)
TACROLIMUS BLD-MCNC: 9.6 UG/L (ref 5–15)
TME LAST DOSE: NORMAL H
TME LAST DOSE: NORMAL H
WBC # BLD AUTO: 5.5 10E3/UL (ref 4–11)
WBC # BLD AUTO: 5.6 10E3/UL (ref 4–11)

## 2024-02-10 PROCEDURE — 36415 COLL VENOUS BLD VENIPUNCTURE: CPT

## 2024-02-10 PROCEDURE — 87633 RESP VIRUS 12-25 TARGETS: CPT

## 2024-02-10 PROCEDURE — 99255 IP/OBS CONSLTJ NEW/EST HI 80: CPT | Mod: FS | Performed by: NURSE PRACTITIONER

## 2024-02-10 PROCEDURE — 258N000003 HC RX IP 258 OP 636

## 2024-02-10 PROCEDURE — 250N000013 HC RX MED GY IP 250 OP 250 PS 637

## 2024-02-10 PROCEDURE — 87581 M.PNEUMON DNA AMP PROBE: CPT

## 2024-02-10 PROCEDURE — 250N000013 HC RX MED GY IP 250 OP 250 PS 637: Performed by: STUDENT IN AN ORGANIZED HEALTH CARE EDUCATION/TRAINING PROGRAM

## 2024-02-10 PROCEDURE — 85027 COMPLETE CBC AUTOMATED: CPT

## 2024-02-10 PROCEDURE — 120N000011 HC R&B TRANSPLANT UMMC

## 2024-02-10 PROCEDURE — 250N000012 HC RX MED GY IP 250 OP 636 PS 637

## 2024-02-10 PROCEDURE — 99233 SBSQ HOSP IP/OBS HIGH 50: CPT | Mod: GC | Performed by: INTERNAL MEDICINE

## 2024-02-10 PROCEDURE — 80048 BASIC METABOLIC PNL TOTAL CA: CPT

## 2024-02-10 PROCEDURE — 80197 ASSAY OF TACROLIMUS: CPT

## 2024-02-10 RX ORDER — TACROLIMUS 1 MG/1
2 CAPSULE ORAL EVERY EVENING
Status: DISCONTINUED | OUTPATIENT
Start: 2024-02-10 | End: 2024-02-11 | Stop reason: HOSPADM

## 2024-02-10 RX ORDER — SODIUM CHLORIDE, SODIUM LACTATE, POTASSIUM CHLORIDE, CALCIUM CHLORIDE 600; 310; 30; 20 MG/100ML; MG/100ML; MG/100ML; MG/100ML
INJECTION, SOLUTION INTRAVENOUS CONTINUOUS
Status: DISCONTINUED | OUTPATIENT
Start: 2024-02-10 | End: 2024-02-11

## 2024-02-10 RX ORDER — SIMETHICONE 80 MG
80 TABLET,CHEWABLE ORAL EVERY 6 HOURS PRN
Status: DISCONTINUED | OUTPATIENT
Start: 2024-02-10 | End: 2024-02-11 | Stop reason: HOSPADM

## 2024-02-10 RX ORDER — FAMOTIDINE 10 MG
10 TABLET ORAL 2 TIMES DAILY
Status: DISCONTINUED | OUTPATIENT
Start: 2024-02-10 | End: 2024-02-11 | Stop reason: HOSPADM

## 2024-02-10 RX ADMIN — FAMOTIDINE 10 MG: 10 TABLET ORAL at 20:06

## 2024-02-10 RX ADMIN — MYCOPHENILIC ACID 180 MG: 180 TABLET, DELAYED RELEASE ORAL at 16:11

## 2024-02-10 RX ADMIN — THERA TABS 1 TABLET: TAB at 09:59

## 2024-02-10 RX ADMIN — SODIUM CHLORIDE, POTASSIUM CHLORIDE, SODIUM LACTATE AND CALCIUM CHLORIDE 1000 ML: 600; 310; 30; 20 INJECTION, SOLUTION INTRAVENOUS at 10:00

## 2024-02-10 RX ADMIN — TACROLIMUS 2 MG: 1 CAPSULE ORAL at 17:45

## 2024-02-10 RX ADMIN — TACROLIMUS 2 MG: 1 CAPSULE ORAL at 09:58

## 2024-02-10 RX ADMIN — SIMETHICONE 80 MG: 80 TABLET, CHEWABLE ORAL at 23:40

## 2024-02-10 RX ADMIN — MYCOPHENILIC ACID 360 MG: 360 TABLET, DELAYED RELEASE ORAL at 20:06

## 2024-02-10 RX ADMIN — MYCOPHENILIC ACID 360 MG: 360 TABLET, DELAYED RELEASE ORAL at 10:00

## 2024-02-10 RX ADMIN — ASPIRIN 81 MG: 81 TABLET ORAL at 10:00

## 2024-02-10 RX ADMIN — PANTOPRAZOLE SODIUM 40 MG: 40 TABLET, DELAYED RELEASE ORAL at 10:00

## 2024-02-10 RX ADMIN — SODIUM CHLORIDE, POTASSIUM CHLORIDE, SODIUM LACTATE AND CALCIUM CHLORIDE: 600; 310; 30; 20 INJECTION, SOLUTION INTRAVENOUS at 16:19

## 2024-02-10 ASSESSMENT — ACTIVITIES OF DAILY LIVING (ADL)
ADLS_ACUITY_SCORE: 20
DIFFICULTY_COMMUNICATING: NO
ADLS_ACUITY_SCORE: 35
CHANGE_IN_FUNCTIONAL_STATUS_SINCE_ONSET_OF_CURRENT_ILLNESS/INJURY: NO
TOILETING_ISSUES: NO
ADLS_ACUITY_SCORE: 35
ADLS_ACUITY_SCORE: 35
WALKING_OR_CLIMBING_STAIRS_DIFFICULTY: NO
ADLS_ACUITY_SCORE: 35
WEAR_GLASSES_OR_BLIND: NO
ADLS_ACUITY_SCORE: 35
DIFFICULTY_EATING/SWALLOWING: NO
ADLS_ACUITY_SCORE: 35
ADLS_ACUITY_SCORE: 35
FALL_HISTORY_WITHIN_LAST_SIX_MONTHS: NO
ADLS_ACUITY_SCORE: 35
ADLS_ACUITY_SCORE: 35
DRESSING/BATHING_DIFFICULTY: NO
ADLS_ACUITY_SCORE: 20
ADLS_ACUITY_SCORE: 35
DOING_ERRANDS_INDEPENDENTLY_DIFFICULTY: YES
HEARING_DIFFICULTY_OR_DEAF: NO
CONCENTRATING,_REMEMBERING_OR_MAKING_DECISIONS_DIFFICULTY: NO

## 2024-02-10 NOTE — PROGRESS NOTES
Vitals are stable,alert and oriented x4,up independently.CBC and platelet lab drawn  at 1600,result pending.Tolerated clear liquids,denied nausea.complained of abdominal fullness,declined prn calcium carbonate,provider would be contact anti gas medication order. Patient is to be admitted to unit 7A,report given to receiving nurse on unit 7A.Patient transferred to Unit 7A at 1700.

## 2024-02-10 NOTE — PROVIDER NOTIFICATION
Patient would like to get something for heart burn/acid reflex, patient does not want take Tums, treatment team, Yovanny Garcia MD, notified.

## 2024-02-10 NOTE — PLAN OF CARE
/61 (BP Location: Right arm)   Pulse 97   Temp (!) 96.4  F (35.8  C) (Oral)   Resp 18   SpO2 96%    Time: 2139-2357  Patient is alert and oriented, independent, no c/o pain or nausea this shift, C/o heart burn, treatment team notified, VS stable.

## 2024-02-10 NOTE — PLAN OF CARE
Goal Outcome Evaluation:     /64 (BP Location: Right arm)   Pulse 96   Temp (!) 96.4  F (35.8  C) (Oral)   Resp 18   SpO2 95%    RA.LR at 100 hr PIVx1.  Patient is alert and oriented, independent, no c/o pain or nausea during the night.  No c/o heart burn  during the night and refused TUMs last evening. treatment team notified,  and no meds ordered yet.Will continue to monitor.

## 2024-02-10 NOTE — PROGRESS NOTES
[facility]    Progress Note - Medicine Service, [facility] TEAM 2       Date of Admission:  2/9/2024    Assessment & Plan   Daly Seaman is a 43 year old female admitted on 2/9/2024. She has a history of IgA nephropathy status post renal transplant in 2021 on tacrolimus and mycophenolate who was admitted with nausea, vomiting, diarrhea, and inability to tolerate oral intake.  On admission, found to have prerenal PATRICIA and tested positive for rotavirus.    Today  - tacrolimus elevated, will reduce to 2mg BID per renal transplant recs  - continue mIVFs @ 100ml/hr  - viral respiratory swab pending  - mycophenolate level pending  - likely discharge tomorrow   - repeat Hgb (1st likely erroneous)     # Prerenal PATRICIA  # Nausea, vomiting, diarrhea due to rotavirus gastroenteritis  # non anion gap metabolic acidosis due to diarrhea  # IgA nephropathy status post renal transplant (2021) with mild baseline CKD (stage 3)  # Inability to tolerate PO   Baseline creatinine 1.5-1.6.  On admission, creatinine 2.0 and BUN 24.  CT abdomen/pelvis obtained in emergency department and negative for acute findings.  Enteric panel positive for rotavirus.  Admission for supportive cares, IV fluid resuscitation, and management of nausea and vomiting (especially important in setting of immunosuppression medications)  - transplant nephrology following, appreciate recommendations  -- Status post 2 L IV fluids in ED, 1L LR on floor  - continue mIVF w/ LR 100ml/hr   - reduce tacrolimus to 2mg BID per renal recs  - continue mycophenolate at similar dose  - I/Os  -Antiemetic order set  - holding PTA ACEi    Mild Normocytic Anemia  Hgb 11.1 as informed by nursing today, repeat pending. Likely hemoconcentrated on admission as baseline Hgb ~12 and was 15 on admission.   - repeat pending  - no evidence of active clinical bleeding  - ctm      # Large uterine fibroid measuring up to 9.3 cm  # Possibly  malpositioned IUD  -Incidental finding noted on CT scan and thus follow-up pelvic ultrasound was completed with results as above.  Asymptomatic, no pelvic pain.  Right ovarian cyst also noted, no concern for ovarian torsion.  Patient has an OB/GYN that she already follows with, plan for follow-up with them on discharge  -OB/GYN follow-up on discharge            Diet: Advance Diet as Tolerated: Clear Liquid Diet    DVT Prophylaxis: not indicated  Avila Catheter: Not present  Fluids: LR 100ml/hr  Lines: None     Cardiac Monitoring: None  Code Status: Full Code      Clinically Significant Risk Factors                                    Disposition Plan     Expected Discharge Date: 02/11/2024                The patient's care was discussed with the Attending Physician, Dr. Mina Lara MD  Medicine Service, [facility] TEAM [address] [place] [address]  Securely message with [phone] (more info)  Text page via [employer] Paging/Directory   See signed in provider for up to date coverage information  ______________________________________________________________________    Interval History   No acute events overnight. Nursing notes reviewed. Continues to have loose stool but improved. No vomiting since 2/9 AM.     Physical Exam   Vital Signs: Temp: (!) 96.4  F (35.8  C) Temp src: Oral BP: 124/77 Pulse: 85   Resp: 18 SpO2: 96 % O2 Device: None (Room air)    Weight: 0 lbs 0 oz    General Appearance:  Sitting up in bed, no acute distress  Respiratory: Lungs clear to auscultate bilaterally, no wheezing or crackles  Cardiovascular: Regular rate and rhythm, no murmurs  GI: Soft, nondistended, nontender to palpation  Skin: Dry mucous membranes  Extremities: no edema     Medical Decision Making         Data     I have personally reviewed the following data over the past 24 hrs:    N/A  \   N/A   / N/A     137 109 (H) 19.3 /  89   3.9 19 (L) 1.84 (H) \

## 2024-02-10 NOTE — CONSULTS
[facility]  Transplant Nephrology Consult  Date of Admission:  2/9/2024  Today's Date: 02/10/2024  Requesting physician: Dania Enriquez MD    Recommendations:  - continue LR @100  -recommend covid swab and RSP to complete infectious workup  -Reduce tacrolimus to 2mg BID    Assessment & Plan   # LDKT and PATRICIA: Trend down, 1.8 (2) likely secondary to volume losses with diarrhea and decreased PO intake.     - Baseline Creatinine: ~ 1.4-1.6   - Proteinuria: Normal (<0.2 grams)   - Date DSA Last Checked:          Latest DSA: Not checked recently, but was negative with last check   - BK Viremia: No   - Kidney Tx Biopsy: No    # Immunosuppression: Tacrolimus immediate release (goal 4-6) and Mycophenolic acid (dose 360mg, 180mg, 360mg)  PTA regimen: 2mg Qam/2.5mg QPM   - Patient is in an immunosuppressed state and will continue to monitor for efficacy and toxicity of immunosuppression medications.   - Changes: Yes - Decrease Tacrolimus to 2mg BID with supratherapeutic level      # IgA nephropathy:              - monitor for hematuria and proteinuria    # Infection Prophylaxis:   - PJP: None    # Hypertension: Hypotensive;  Goal BP: < 140/90 (Hospitalization goal)   - Volume status: Hypovolemic     - Changes: Yes - continue IVF       # Mineral Bone Disorder:    - Secondary renal hyperparathyroidism; PTH level: Not checked recently        On treatment: None  - Vitamin D; level: Not checked recently        On supplement: Yes  - Calcium; level: Normal        On supplement: No  - Phosphorus; level: Normal        On supplement: No     # Electrolytes:   - Potassium; level: normal        On supplement: No  - Magnesium; level: Normal        On supplement: No  - Bicarbonate; level: low        On supplement: No  - Sodium; level: Normal     # Thyroid nodule:              - followed regularly by PCP/endocrine,               +strong family hx of thyroid disease      # Transplant  History:  Etiology of Kidney Failure: IgA nephropathy  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    Seen and discussed with Dr. Rachna Santiago NP  Pager: [phone]          Physician Attestation     I saw and evaluated Daly Seaman as part of a shared APRN/PA visit.     I personally reviewed the vital signs, medications, labs, and imaging.    I personally provided a substantive portion of care for this patient and I approve the care plan as written by the DUDLEY.  I was involved with Medical Decision Making including: Please see A&P for additional details of medical decision making.  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 Continue IVF, reduce tacrolimus dose to goal continue /180/360.      Rachna Dasilva MD  Date of Service (when I saw the patient): 02/10/24      REASON FOR CONSULT   PATRICIA    History of Present Illness   Daly Seaman is a 43 year old female  past medical history of ESRD 2/2 IgA nephropathy, hypertension, and thyroid disease. She is s/p preemptive LDKTx 4/8/21.   She presents with a 4 day history of nausea and watery diarrhea. Febrile to 100.5 on presentation of symptoms.  No fevers at this time. Reports she had a day of symptom improvement but then vomiting and diarrhe represented on the tez of 2/8 warranting presentation to the ED.  She reports diarrhea persists this am but has no further vomiting since 2/9 am. She has tolerated some clear liquids this am.   She denies any urinary complaints, hematuria, graft tenderness.     Review of Systems    The 10 point  Review of Systems is negative other than noted in the HPI or here.      Past Medical History    I have reviewed this patient's medical history and updated it with pertinent information if needed.   Past Medical History:   Diagnosis Date    History of blood transfusion 2021    History of colposcopy 2022    Hypertension     variable, based on health history    Thyroid disease     On watch, hypothyroid       Past Surgical History   I have reviewed this patient's surgical history and updated it with pertinent information if needed.  Past Surgical History:   Procedure Laterality Date    ABDOMEN SURGERY  2021    IR THYROID BIOPSY  2020    IR THYROID BIOPSY  09/15/2015    TRANSPLANT  2012    kidney       Family History   I have reviewed this patient's family history and updated it with pertinent information if needed.   Family History   Problem Relation Age of Onset    Genetic Disorder Mother         Osteogenesis imperfecta    Thyroid Disease Mother         hypo thyroid    Mental Illness Father     Genetic Disorder Brother         Osteogenesis imperfecta        Social History   I have reviewed this patient's social history and updated it with pertinent information if needed. Daly Seaman  reports that she has never smoked. She has never used smokeless tobacco. She reports that she does not drink alcohol and does not use drugs.    Allergies   No Known Allergies  Prior to Admission Medications    aspirin  81 mg Oral Daily    famotidine  10 mg Oral BID    [Held by provider] lisinopril  2.5 mg Oral Daily    multivitamin, therapeutic  1 tablet Oral Daily    mycophenolic acid  180 mg Oral Daily    mycophenolic acid  360 mg Oral QPM    mycophenolic acid  360 mg Oral QAM    pantoprazole  40 mg Oral Daily    sodium chloride (PF)  3 mL Intracatheter Q8H    tacrolimus  2 mg Oral Daily    tacrolimus  2.5 mg Oral QPM         Physical Exam   Temp  Av.5  F (36.4  C)  Min: 96.4  F (35.8  C)   Max: 98.3  F (36.8  C)      Pulse  Av  Min: 77  Max: 120 Resp  Av.8  Min: 17  Max: 18  SpO2  Av.2 %  Min: 95 %  Max: 100 %     /77 (BP Location: Right arm, Cuff Size: Adult Regular)   Pulse 85   Temp (!) 96.4  F (35.8  C) (Oral)   Resp 18   SpO2 96%     Admit       GENERAL APPEARANCE: alert and no distress  HENT: mouth without ulcers or lesions  RESP: lungs clear to auscultation - no rales, rhonchi or wheezes  CV: regular rhythm, normal rate, no rub, no murmur  EDEMA: no LE edema bilaterally  ABDOMEN: soft, nondistended, nontender, bowel sounds normal  MS: extremities normal - no gross deformities noted, no evidence of inflammation in joints, no muscle tenderness  SKIN: no rash  PSYCH: mentation appears normal and affect normal/bright  TX KIDNEY: normal    Data   CMP  Recent Labs   Lab 02/10/24  0651 24  1306 24  1055    139  --    POTASSIUM 3.9 4.4  --    CHLORIDE 109* 106  --    CO2 19* 20*  --    ANIONGAP 9 13  --    GLC 89 102*  --    BUN 19.3 23.5*  --    CR 1.84* 2.00*  --    GFRESTIMATED 34* 31*  --    ROX 8.6 9.1  --    MAG  --   --  2.1   PROTTOTAL  --  7.0  --    ALBUMIN  --  4.5  --    BILITOTAL  --  0.3  --    ALKPHOS  --  48  --    AST  --  23  --    ALT  --  12  --      CBC  Recent Labs   Lab 24  1055   HGB 15.2   WBC 10.8   RBC 5.37*   HCT 45.5   MCV 85   MCH 28.3   MCHC 33.4   RDW 12.3        INRNo lab results found in last 7 days.  ABGNo lab results found in last 7 days.   Urine Studies  Recent Labs   Lab Test 24  1325 10/07/23  0913 23  0844 23  0844   COLOR Yellow Yellow Light Yellow Light Yellow   APPEARANCE Slightly Cloudy* Clear Clear Clear   URINEGLC Negative Negative Negative Negative   URINEBILI Small* Negative Negative Negative   URINEKETONE 20* Negative Negative Negative   SG 1.030 1.020 1.016 1.022   UBLD Trace* Negative Negative Negative   URINEPH 5.5 6.0 6.0 5.5   PROTEIN 100* Negative Negative 10*   UROBILINOGEN   --  0.2  --   --    NITRITE Negative Negative Negative Negative   LEUKEST Negative Negative Negative Negative   RBCU 1 2-5* 0 5*   WBCU 2 0-5 1 1     No lab results found.  PTH  No lab results found.  Iron Studies  No lab results found.    IMAGING:  All imaging studies reviewed by me.

## 2024-02-11 VITALS
OXYGEN SATURATION: 97 % | WEIGHT: 136.47 LBS | DIASTOLIC BLOOD PRESSURE: 88 MMHG | RESPIRATION RATE: 18 BRPM | SYSTOLIC BLOOD PRESSURE: 136 MMHG | BODY MASS INDEX: 19.3 KG/M2 | HEART RATE: 84 BPM | TEMPERATURE: 98.2 F

## 2024-02-11 LAB
ANION GAP SERPL CALCULATED.3IONS-SCNC: 10 MMOL/L (ref 7–15)
BUN SERPL-MCNC: 13.7 MG/DL (ref 6–20)
CALCIUM SERPL-MCNC: 8.6 MG/DL (ref 8.6–10)
CHLORIDE SERPL-SCNC: 108 MMOL/L (ref 98–107)
CREAT SERPL-MCNC: 1.51 MG/DL (ref 0.51–0.95)
DEPRECATED HCO3 PLAS-SCNC: 19 MMOL/L (ref 22–29)
EGFRCR SERPLBLD CKD-EPI 2021: 44 ML/MIN/1.73M2
ERYTHROCYTE [DISTWIDTH] IN BLOOD BY AUTOMATED COUNT: 12.3 % (ref 10–15)
GLUCOSE SERPL-MCNC: 93 MG/DL (ref 70–99)
HCT VFR BLD AUTO: 34.8 % (ref 35–47)
HGB BLD-MCNC: 11.2 G/DL (ref 11.7–15.7)
MCH RBC QN AUTO: 28.4 PG (ref 26.5–33)
MCHC RBC AUTO-ENTMCNC: 32.2 G/DL (ref 31.5–36.5)
MCV RBC AUTO: 88 FL (ref 78–100)
PLATELET # BLD AUTO: 189 10E3/UL (ref 150–450)
POTASSIUM SERPL-SCNC: 4 MMOL/L (ref 3.4–5.3)
RBC # BLD AUTO: 3.94 10E6/UL (ref 3.8–5.2)
SODIUM SERPL-SCNC: 137 MMOL/L (ref 135–145)
TACROLIMUS BLD-MCNC: 13.4 UG/L (ref 5–15)
TME LAST DOSE: NORMAL H
TME LAST DOSE: NORMAL H
WBC # BLD AUTO: 6.4 10E3/UL (ref 4–11)

## 2024-02-11 PROCEDURE — 86359 T CELLS TOTAL COUNT: CPT | Performed by: NURSE PRACTITIONER

## 2024-02-11 PROCEDURE — 82784 ASSAY IGA/IGD/IGG/IGM EACH: CPT | Performed by: NURSE PRACTITIONER

## 2024-02-11 PROCEDURE — 36415 COLL VENOUS BLD VENIPUNCTURE: CPT | Performed by: INTERNAL MEDICINE

## 2024-02-11 PROCEDURE — 250N000013 HC RX MED GY IP 250 OP 250 PS 637: Performed by: INTERNAL MEDICINE

## 2024-02-11 PROCEDURE — 80197 ASSAY OF TACROLIMUS: CPT | Performed by: INTERNAL MEDICINE

## 2024-02-11 PROCEDURE — 258N000003 HC RX IP 258 OP 636: Performed by: INTERNAL MEDICINE

## 2024-02-11 PROCEDURE — 99233 SBSQ HOSP IP/OBS HIGH 50: CPT | Mod: FS | Performed by: NURSE PRACTITIONER

## 2024-02-11 PROCEDURE — 86360 T CELL ABSOLUTE COUNT/RATIO: CPT | Performed by: NURSE PRACTITIONER

## 2024-02-11 PROCEDURE — 99239 HOSP IP/OBS DSCHRG MGMT >30: CPT | Mod: GC | Performed by: INTERNAL MEDICINE

## 2024-02-11 PROCEDURE — 250N000013 HC RX MED GY IP 250 OP 250 PS 637

## 2024-02-11 PROCEDURE — 36415 COLL VENOUS BLD VENIPUNCTURE: CPT | Performed by: NURSE PRACTITIONER

## 2024-02-11 PROCEDURE — 250N000012 HC RX MED GY IP 250 OP 636 PS 637

## 2024-02-11 PROCEDURE — 85014 HEMATOCRIT: CPT

## 2024-02-11 PROCEDURE — 250N000013 HC RX MED GY IP 250 OP 250 PS 637: Performed by: STUDENT IN AN ORGANIZED HEALTH CARE EDUCATION/TRAINING PROGRAM

## 2024-02-11 PROCEDURE — 80048 BASIC METABOLIC PNL TOTAL CA: CPT

## 2024-02-11 RX ORDER — SODIUM BICARBONATE 650 MG/1
650 TABLET ORAL 2 TIMES DAILY
Qty: 60 TABLET | Refills: 0 | Status: SHIPPED | OUTPATIENT
Start: 2024-02-11

## 2024-02-11 RX ORDER — LISINOPRIL 2.5 MG/1
2.5 TABLET ORAL DAILY
Start: 2024-02-11

## 2024-02-11 RX ORDER — TACROLIMUS 1 MG/1
2 CAPSULE ORAL 2 TIMES DAILY
Start: 2024-02-11 | End: 2024-10-02

## 2024-02-11 RX ORDER — SODIUM BICARBONATE 650 MG/1
650 TABLET ORAL 2 TIMES DAILY
Qty: 60 TABLET | Refills: 1 | Status: SHIPPED | OUTPATIENT
Start: 2024-02-11

## 2024-02-11 RX ORDER — ONDANSETRON 4 MG/1
4 TABLET, ORALLY DISINTEGRATING ORAL EVERY 8 HOURS PRN
Qty: 5 TABLET | Refills: 0 | Status: SHIPPED | OUTPATIENT
Start: 2024-02-11

## 2024-02-11 RX ORDER — SODIUM BICARBONATE 650 MG/1
650 TABLET ORAL 2 TIMES DAILY
Status: DISCONTINUED | OUTPATIENT
Start: 2024-02-11 | End: 2024-02-11 | Stop reason: HOSPADM

## 2024-02-11 RX ORDER — ONDANSETRON 4 MG/1
4 TABLET, ORALLY DISINTEGRATING ORAL EVERY 6 HOURS PRN
Qty: 10 TABLET | Refills: 0 | Status: SHIPPED | OUTPATIENT
Start: 2024-02-11 | End: 2024-02-14

## 2024-02-11 RX ADMIN — SODIUM BICARBONATE 650 MG: 650 TABLET ORAL at 12:40

## 2024-02-11 RX ADMIN — MYCOPHENILIC ACID 360 MG: 360 TABLET, DELAYED RELEASE ORAL at 08:09

## 2024-02-11 RX ADMIN — ASPIRIN 81 MG: 81 TABLET ORAL at 08:08

## 2024-02-11 RX ADMIN — SODIUM CHLORIDE, POTASSIUM CHLORIDE, SODIUM LACTATE AND CALCIUM CHLORIDE 1000 ML: 600; 310; 30; 20 INJECTION, SOLUTION INTRAVENOUS at 12:39

## 2024-02-11 RX ADMIN — TACROLIMUS 2 MG: 1 CAPSULE ORAL at 08:09

## 2024-02-11 RX ADMIN — MYCOPHENILIC ACID 180 MG: 180 TABLET, DELAYED RELEASE ORAL at 12:40

## 2024-02-11 RX ADMIN — FAMOTIDINE 10 MG: 10 TABLET ORAL at 08:08

## 2024-02-11 RX ADMIN — THERA TABS 1 TABLET: TAB at 08:09

## 2024-02-11 RX ADMIN — PANTOPRAZOLE SODIUM 40 MG: 40 TABLET, DELAYED RELEASE ORAL at 08:08

## 2024-02-11 ASSESSMENT — ACTIVITIES OF DAILY LIVING (ADL)
ADLS_ACUITY_SCORE: 21
ADLS_ACUITY_SCORE: 20
ADLS_ACUITY_SCORE: 21
ADLS_ACUITY_SCORE: 20
ADLS_ACUITY_SCORE: 21

## 2024-02-11 NOTE — PROGRESS NOTES
/88 (BP Location: Right arm)   Pulse 84   Temp 98.2  F (36.8  C) (Oral)   Resp 18   Wt 61.9 kg (136 lb 7.4 oz)   SpO2 97%   BMI 19.30 kg/m      Shift: 8400-3488  VS: VSS on RA   Pain: Denies pain or nausea  Neuro: WNL  Cardiac: WNL  Respiratory: on RA    Diet/Appetite:  Tolerating regular diet  /GI: Voiding on hat, one small, soft BM  LDA's: PIV infusing LR bolus 1000 ml  Activity: independent   Patient will be discharging this afternoon

## 2024-02-11 NOTE — PROGRESS NOTES
[facility]   Transplant Nephrology Progress Note  Date of Admission:  2/9/2024  Today's Date: 02/11/2024    Recommendations:  - 1L LR bolus today  -ok to discharge home from Nephrology   -Recommend repeat labs outpatient early next week mon/tues to include BMP, Tac level  -start bicarb 650mg BID.  -Add on Tcell subset, IgG (ordered for you)  -continue to hold lisinopril    Assessment & Plan   # LDKT and PATRICIA: Trend down   - Baseline Creatinine: ~ 1.4-1.6   - Proteinuria: Normal (<0.2 grams)   - Date DSA Last Checked:     Latest DSA: Not checked recently, but was negative with last check   - BK Viremia: No   - Kidney Tx Biopsy: No      # Immunosuppression: dose 360mg, 180mg, 360mg    - Patient is in an immunosuppressed state and will continue to monitor for efficacy and toxicity of immunosuppression medications.   - Changes: No continue Tacrolimus 2mg BID. Will repeat labs and level early this week mon/Tuesday.     # IgA nephropathy:              - monitor for hematuria and proteinuria  # Infection Prophylaxis:   - PJP: None  # Hypertension: Hypotensive;  Goal BP: < 140/90 (Hospitalization goal)   - Volume status: mildly Hypovolemic    - Changes: Yes - Give additional 1L LR bolus today with ongoing diarrhea        # Anemia in Chronic Renal Disease: Hgb: Trend down      GIFTY: No   - Iron studies: Replete    # Mineral Bone Disorder:   - Secondary renal hyperparathyroidism; PTH level: Not checked recently        On treatment: None  - Vitamin D; level: Not checked recently        On supplement: Yes  - Calcium; level: Normal        On supplement: No  - Phosphorus; level: Normal        On supplement: No    # Electrolytes:   - Potassium; level: Normal        On supplement: No  - Magnesium; level: Normal        On supplement: No  - Bicarbonate; level: Low        On supplement: No, start bicarb 650mg BID.  - Sodium; level: Normal     # Transplant History:  Etiology of Kidney  Failure: IgA nephropathy  Tx: LDKT preemptive from mother (one haplotype match   Transplant: 2021 (Kidney)  Significant changes in immunosuppression:  switched to steroid free regimen due to side effects    Significant transplant-related complications: None    Recommendations were communicated to the primary team verbally.    Seen and discussed with Dr. Rachna Santiago NP   Pager: [phone]        Physician Attestation     I saw and evaluated Daly Seaman as part of a shared APRN/PA visit.     I personally reviewed the vital signs, medications, labs, and imaging.    I personally provided a substantive portion of care for this patient and I approve the care plan as written by the DUDLEY.  I was involved with Medical Decision Making including: Please see A&P for additional details of medical decision making.  MANAGEMENT DISCUSSED with the following over the past 24 hours: IVF bolus, continue to hold lisinopril, labs this week with FK trough-dose reduced     Rachna Dasilva MD  Date of Service (when I saw the patient): 24    Interval History    Reports nausea and vomiting have resolved but diarrhea persists. Started following pm meal last evening. Believes UOP lower than usual. Denies fevers. Reports abdominal cramping.     Review of Systems   4 point ROS was obtained and negative except as noted in the Interval History.    MEDICATIONS:   aspirin  81 mg Oral Daily    famotidine  10 mg Oral BID    [Held by provider] lisinopril  2.5 mg Oral Daily    multivitamin, therapeutic  1 tablet Oral Daily    mycophenolic acid  180 mg Oral Daily    mycophenolic acid  360 mg Oral QPM    mycophenolic acid  360 mg Oral QAM    pantoprazole  40 mg Oral Daily    sodium chloride (PF)  3 mL Intracatheter Q8H    tacrolimus  2 mg Oral QPM    tacrolimus  2 mg Oral Daily         Physical Exam   Temp  Av.8  F (36.6  C)  Min: 96.4  F (35.8  C)  Max: 98.7  F (37.1  C)      Pulse  Av.9  Min: 77  Max: 120  Resp  Av.2  Min: 16  Max: 18  SpO2  Av.8 %  Min: 95 %  Max: 100 %     /83 (BP Location: Right arm)   Pulse 82   Temp 98.1  F (36.7  C) (Oral)   Resp 18   Wt 61.9 kg (136 lb 7.4 oz)   SpO2 97%   BMI 19.30 kg/m     Date 24 07 - 24 0659   Shift 7495-6233 4296-7063 0633-7476 24 Hour Total   INTAKE   P.O. 120   120   Shift Total(mL/kg) 120(1.94)   120(1.94)   OUTPUT   Urine 1300   1300   Shift Total(mL/kg) 1300(21)   1300(21)   Weight (kg) 61.9 61.9 61.9 61.9      Admit Weight: 61.9 kg (136 lb 7.4 oz)     GENERAL APPEARANCE: alert and no distress  HENT: mouth without ulcers or lesions  RESP: lungs clear to auscultation - no rales, rhonchi or wheezes  CV: regular rhythm, normal rate, no rub, no murmur  EDEMA: no LE edema bilaterally  ABDOMEN: soft, nondistended, nontender, bowel sounds hyperactive  MS: extremities normal - no gross deformities noted, no evidence of inflammation in joints, no muscle tenderness  SKIN: no rash    Data   All labs reviewed by me.  CMP  Recent Labs   Lab 24  0647 02/10/24  0651 24  1306 24  1055    137 139  --    POTASSIUM 4.0 3.9 4.4  --    CHLORIDE 108* 109* 106  --    CO2 19* 19* 20*  --    ANIONGAP 10 9 13  --    GLC 93 89 102*  --    BUN 13.7 19.3 23.5*  --    CR 1.51* 1.84* 2.00*  --    GFRESTIMATED 44* 34* 31*  --    ROX 8.6 8.6 9.1  --    MAG  --   --   --  2.1   PROTTOTAL  --   --  7.0  --    ALBUMIN  --   --  4.5  --    BILITOTAL  --   --  0.3  --    ALKPHOS  --   --  48  --    AST  --   --  23  --    ALT  --   --  12  --      CBC  Recent Labs   Lab 24  0647 02/10/24  1811 02/10/24  1547 24  1055   HGB 11.2* 12.1 11.2* 15.2   WBC 6.4 5.6 5.5 10.8   RBC 3.94 4.36 3.96 5.37*   HCT 34.8* 38.0 34.6* 45.5   MCV 88 87 87 85   MCH 28.4 27.8 28.3 28.3   MCHC 32.2 31.8 32.4 33.4   RDW 12.3 12.4 12.4 12.3    223 209 255     INRNo lab results found in last 7 days.  ABGNo lab results found in last 7 days.   Urine  Studies  Recent Labs   Lab Test 02/09/24  1325 10/07/23  0913 09/09/23  0844 08/12/23  0844   COLOR Yellow Yellow Light Yellow Light Yellow   APPEARANCE Slightly Cloudy* Clear Clear Clear   URINEGLC Negative Negative Negative Negative   URINEBILI Small* Negative Negative Negative   URINEKETONE 20* Negative Negative Negative   SG 1.030 1.020 1.016 1.022   UBLD Trace* Negative Negative Negative   URINEPH 5.5 6.0 6.0 5.5   PROTEIN 100* Negative Negative 10*   UROBILINOGEN  --  0.2  --   --    NITRITE Negative Negative Negative Negative   LEUKEST Negative Negative Negative Negative   RBCU 1 2-5* 0 5*   WBCU 2 0-5 1 1     No lab results found.  PTH  No lab results found.  Iron Studies  No lab results found.    IMAGING:  All imaging studies reviewed by me.

## 2024-02-11 NOTE — PLAN OF CARE
/79 (BP Location: Right arm)   Pulse 78   Temp 98.7  F (37.1  C) (Oral)   Resp 16   Wt 61.9 kg (136 lb 7.4 oz)   SpO2 98%   BMI 19.30 kg/m      Shift: 6979-3596  VS: stable on RA, afebrile  Neuro: AOx4  BG: none  Labs: pending am collections   Respiratory: WNL  Pain/Nausea/PRN: denies pain/nausea. PRN simethicone given once for gas discomfort    Diet: Reg  LDA: PIV SL   GI/: voiding adequately; frequent loose BM  Skin:  healed scar from kidney transplant   Mobility: independent   Plan: continue POC     Handoff given to following RN.

## 2024-02-11 NOTE — PLAN OF CARE
/76 (BP Location: Right arm)   Pulse 79   Temp 98.2  F (36.8  C) (Oral)   Resp 16   Wt 61.9 kg (136 lb 7.4 oz)   SpO2 100%   BMI 19.30 kg/m      Assumed cares [phone]  Neuro: A/Ox4  Pain/Nausea: denies pain and nausea  Cardiac: rate and rhythm regular  Resp: lung sounds clear, equal bilaterally  GI/: voiding spontaneously, hat in toilet; pt reports loose, small Bms every time she uses the bathroom, but states it is improving  Diet/Appetite: Regular diet  Skin: CDI, RLQ scar from K transplant  Access: PIV - LR @100  Activity: up ad theresa  Plan: CBC and Resp virus panel   Will continue with plan of care and notify team of any changes.?    JT ELLSWROTH RN on 2/10/2024 at 6:50 PM

## 2024-02-11 NOTE — DISCHARGE SUMMARY
AVSS on RA.  A/Ox4.  Denies pain and nausea.  Voiding spontaneously without difficulty.  Tolerating regular diet.  Up independently.  Discharge instructions and medications reviewed with patient.  PIV removed.  Pt. transported via wheelchair by spouse to pharmacy and lobby door.  Pt. Discharged to home.

## 2024-02-11 NOTE — PROGRESS NOTES
Admitted/transferred from:   Time of arrival on unit 1722  2 RN  Full skin assessment completed by Jt RUCKER and Marquita RUCKER  Skin assessment finding: skin intact, no problems   Interventions/actions: other N/A      Will continue to monitor.  JT ELLSWORTH RN on 2/10/2024 at 6:39 PM

## 2024-02-12 LAB
CD3 CELLS # BLD: 544 CELLS/UL (ref 603–2990)
CD3 CELLS NFR BLD: 38 % (ref 49–84)
CD3+CD4+ CELLS # BLD: 368 CELLS/UL (ref 441–2156)
CD3+CD4+ CELLS NFR BLD: 26 % (ref 28–63)
CD3+CD4+ CELLS/CD3+CD8+ CLL BLD: 2.37 % (ref 1.4–2.6)
CD3+CD8+ CELLS # BLD: 155 CELLS/UL (ref 125–1312)
CD3+CD8+ CELLS NFR BLD: 11 % (ref 10–40)
IGG SERPL-MCNC: 680 MG/DL (ref 610–1616)
MYCOPHENOLATE SERPL LC/MS/MS-MCNC: 1.09 MG/L (ref 1–3.5)
MYCOPHENOLATE-G SERPL LC/MS/MS-MCNC: 28.6 MG/L (ref 30–95)
T CELL COMMENT: ABNORMAL
TME LAST DOSE: ABNORMAL H
TME LAST DOSE: ABNORMAL H

## 2024-02-12 NOTE — DISCHARGE SUMMARY
[facility]  Discharge Summary - Medicine & Pediatrics       Date of Admission:  2/9/2024  Date of Discharge:  2/11/2024  5:11 PM  Discharging Provider: Estefani Lara  Discharge Service: Medicine Service, DIDIER TEAM 2    Discharge Diagnoses   #PATRICIA on CKD stage 3, pre-renal  #IgA nephropathy s/p renal transplant (2021)  #Rotavirus  #large uterine fibroid    Clinically Significant Risk Factors        Follow-up  - labs early next week, BMP/tacro level  - OP nephrology follow-up as scheduled  - obgyn follow-up as scheduled    Unresulted Labs Ordered in the Past 30 Days of this Admission       Date and Time Order Name Status Description    2/11/2024 11:03 AM T cell subset profile In process     2/11/2024 11:03 AM IgG In process     2/9/2024  3:57 PM Mycophenolic Acid by Tandem Mass Spectrometry In process         These results will be followed up by transplant nephrology    Discharge Disposition   Discharged to home  Condition at discharge: Good    Hospital Course   Daly Seaman is a 44 y/o female w/ pmhx notable for IgA nephropathy s/p renal transplant (2021) on tacrolimus and mycophenolate who was admitted for supportive cares and IV fluid resuscitation due to pre-renal PATRICIA 2/2 ongoing GI losses in the setting of rotavirus gastroenteritis.     The following problems were addressed during her hospitalization.    #PATRICIA, pre-renal  #IgA nephropathy s/p renal transplant (2021)  #rotavirus gastroenteritis  #non-anion gap metabolic acidosis 2/2 diarrhea  Presented with several days of nausea, vomiting & diarrhea and subsequently found to have PATRICIA in the setting of rotavirus gastroenteritis. Admission creatinine 2.0, baseline ~1.5. UA with no evidence of infection. CT a/p obtained with no evidence of hydronephrosis or other acute finding. She was adequately fluid resuscitated and on day of discharge creatinine was back at baseline and she was able to tolerate po intake with  resolving diarrhea. Transplant nephrology was consulted- they agreed with fluid resuscitation & recommended decreasing tacrolimus to 2mg BID from PTA dose of 2/2.5mg as her trough elevated between 9-13 (goal 4-6). They also recommended sodium bicarb 650mg BID at discharge.She will follow up with nephrology as OP with labs early next week (BMP, Tac level).   - reduce tacrolimus to 2mg BID (PTA dose 2/2.5mg)  - continue PTA mycophenolate dose (360mg, 180mg, 360mg)  - holding lisinopril 2.5mg until follow-up  - start sodium bicarb 650mg BID  - zofran PRN x3 days for nausea  - IgG T cell subset ordered, nephrology to follow-up    #large uterine fibroid  #possibly malpositioned IUD  Incidental finding noted on CT scan and thus follow-up pelvic ultrasound was completed with results as above. Asymptomatic, no pelvic pain. Right ovarian cyst also noted, no concern for ovarian torsion. Patient has an OB/GYN that she already follows with, plan for follow-up with them on discharge   - patient will follow-up with obgyn provider     #mild normocytic anemia  Hgb 15 on admission, likely concentrated as baseline runs ~12. Intermittent lab values reading ~11.2, likely some component dilutional as well. No active bleeding.  - NTD      Consultations This Hospital Stay   NEPHROLOGY KIDNEY/PANCREAS TRANSPLANT ADULT IP CONSULT    Code Status   Prior, full code      The patient was discussed with Dr. Mina Lara MD  [address] [address]  Phone: [phone]  ______________________________________________________________________    Physical Exam   Vital Signs: Temp: 98.2  F (36.8  C) Temp src: Oral BP: 136/88 Pulse: 84   Resp: 18 SpO2: 97 % O2 Device: None (Room air)    Weight: 136 lbs 7.44 oz    General: in no acute distress, lying down in bed  HEENT: AT/NC, no scleral icterus  Cardio: RRR; no murmurs noted  Pulm: breathing comfortably on  RA  Abdomen: soft, epigastric region mildly TTP, no peritoneal signs  Extremities: warm, dry, no edema  Neuro: alert and oriented, answering questions appropriately  Skin: no concerning rashes or lesions      Primary Care Physician   Samira Abad    Discharge Orders      Reason for your hospital stay    You were admitted for PATRICIA because of the nausea and vomiting.     Activity    Your activity upon discharge: activity as tolerated     Follow Up ([facility])    Follow up with primary care provider, Samira Abad  Follow up with obgyn- you will call  Follow up with nephrology- will touch base with them about timing     Appointments on [place] and/or [facility] (with [facility] or [facility] provider or service). Call [phone] if you haven't heard regarding these appointments within 7 days of discharge.     Diet    Follow this diet upon discharge: ADAT       Significant Results and Procedures   Results for orders placed or performed during the hospital encounter of 02/09/24   CT Abdomen Pelvis w/o Contrast    Narrative    EXAMINATION: CT ABDOMEN PELVIS W/O CONTRAST, 2/9/2024 12:20 PM    INDICATION: abd pain    COMPARISON STUDY: None.    TECHNIQUE: CT scan of the abdomen and pelvis was performed on  multidetector CT scanner using volumetric acquisition technique and  images were reconstructed in multiple planes with variable thickness  and reviewed on dedicated workstations.     CONTRAST: None.    CT scan radiation dose is optimized to minimum requisite dose using  automated dose modulation techniques.    FINDINGS:    Lower thorax: No consolidations. Punctate calcified granuloma in the  right lung base.    Liver: No mass within the limitations of noncontrast technique. No  intrahepatic biliary ductal dilation.    Biliary System: Normal gallbladder. No extrahepatic biliary ductal  dilation.    Pancreas: No pancreatic ductal dilation.    Adrenal glands: No mass or nodules    Spleen: Normal. Small splenule.     Kidneys:  Atrophic native kidneys. Normal appearance of right lower  quadrant transplant kidney. No obstructing calculus or hydronephrosis.    Gastrointestinal tract :Normal appendix. No dilation of small and  large bowel loops. 0.9 cm hyperdensity in the right upper quadrant is  likely ingested material within the duodenum.    Mesentery/peritoneum/retroperitoneum: No mass. No free fluid or air.    Lymph nodes: No significant lymphadenopathy.    Vasculature: Normal caliber aorta.    Pelvis: Urinary bladder is decompressed.  Enlarged bulky uterus.  Indeterminate location of the intrauterine device within the lower  uterine segment.    Osseous structures: No aggressive or acute osseous lesion.      Soft tissues: Within normal limits.      Impression    IMPRESSION:   1. No acute findings in the abdomen or pelvis.  2. Bulky enlarged uterus with indeterminate position of the IUD.  Recommend further assessment with pelvic ultrasound.    EMILY ESCOBEDO MD         SYSTEM ID:  [id number]   US Renal Transplant with Doppler    Narrative    EXAMINATION: US RENAL TRANSPLANT,  2/9/2024 11:17 AM     COMPARISON: None.    HISTORY: RLQ pain    TECHNIQUE:  Grey-scale, color Doppler and spectral flow analysis.    FINDINGS:  The transplant kidney is located right lower quadrant, and measures  12.5 cm. Parenchyma is of normal thickness and echogenicity. No focal  lesions. No hydronephrosis. No perinephric fluid collection.    Renal artery flow:   63 cm/s cm/sec peak systolic at hilum.  36 cm/s peak systolic at anastomosis.  Arcuate artery resistive indices (upper to lower): 0.46, 0.6, 0.54    Renal Vein Flow:  13 cm/s at hilum.   119 cm/s at anastomosis.    Iliac artery flow:  127 cm/s peak systolic above anastomosis.  85 cm/s peak systolic below anastomosis.    Iliac vein flow:  Patent above and below the anastomosis.      Impression    IMPRESSION: Normal transplant kidney ultrasound.    I have personally reviewed the examination and initial  interpretation  and I agree with the findings.    JOHNNA MISTRY DO         SYSTEM ID:  [id number]   US Pelvis Cmplt w Transvag & Doppler LmtPel Duplex Limited    Narrative    EXAMINATION: US PELVIS COMPLETE W TRANSVAGINAL AND DOPPLER LIMITED,  2/9/2024 2:22 PM     COMPARISON: CT abdomen 2/9/2024    HISTORY: pelvic pain    TECHNIQUE: The pelvis was scanned in standard fashion with  transabdominal and transvaginal transducer(s) using both grey scale  and spectral flow and  color Doppler techniques.    FINDINGS:    The uterus measures 14.2 x 7.5 x 8.7 cm. Large heterogenous mass  distorting the uterus measures about 9.3 x 7 x 7.8 cm, the endometrial  cavity is not well visualized, the intrauterine device is seen in the  lower uterine segment and is possibly malpositioned. No free fluid is  demonstrated.    The right ovary measures 5.7 x 3.4 x 3.0 cm.; Volume 30 mL, cyst with  internal echoes in the right ovary measures 3.3 x 2.8 x 1.7 cm.  The left ovary measures 4.8 x 2.2 x 3.4 cm. ; Volume 19 mL; prominent  follicle measuring 1.7 cm.; Bilateral adnexal blood flow on color and  spectral Doppler.      Impression    IMPRESSION:     1. Large uterine echogenicity possibly fibroid measuring up to 9.3 cm,  endometrial cavity is not well visualized, possibly distorted with  mass effect;  intrauterine device apparently in the lower uterine  segment, possibly malpositioned, versus apparent displacement  secondary to mass effect from the fibroid, may consider pelvic 3D   ultrasound/MR for further evaluation as  clinically desired    2. Right ovarian cyst with internal echoes measuring up to 3.3 cm, may  consider attention on follow-up. Bilateral adnexal blood flow on color  and spectral Doppler, presence of blood flow does not necessarily rule  out partial/intermittent torsion, if concern for ovarian torsion  consider gynecology consult.  I have personally reviewed the examination and initial interpretation  and I agree with the  findings.    FRIEDA MOSES MD         SYSTEM ID:  [id number]       Discharge Medications   Discharge Medication List as of 2/11/2024 12:32 PM        START taking these medications    Details   ondansetron (ZOFRAN ODT) 4 MG ODT tab Take 1 tablet (4 mg) by mouth every 6 hours as needed for nausea, Disp-10 tablet, R-0, E-Prescribe      sodium bicarbonate 650 MG tablet Take 1 tablet (650 mg) by mouth 2 times daily, Disp-60 tablet, R-1, E-Prescribe           CONTINUE these medications which have CHANGED    Details   lisinopril (ZESTRIL) 2.5 MG tablet Take 1 tablet (2.5 mg) by mouth daily, No Print OutDo not take until MD says ok.      !! tacrolimus (GENERIC) 1 MG capsule Take 2 capsules (2 mg) by mouth 2 times daily, No Print OutAs of 2/11 please take 2 mg BID until told otherwise       !! - Potential duplicate medications found. Please discuss with provider.        CONTINUE these medications which have NOT CHANGED    Details   albuterol (PROAIR HFA/PROVENTIL HFA/VENTOLIN HFA) 108 (90 Base) MCG/ACT inhaler Inhale 2 puffs into the lungs, Historical      aspirin 81 MG EC tablet Take 81 mg by mouth daily, Historical      biotin 5 MG CAPS Take 1 capsule by mouth, Historical      levonorgestrel (MIRENA) 52 MG (20 mcg/day) IUD 1 each by Intrauterine routeHistorical      Multiple Vitamins-Minerals (ONCOVITE) TABS Take 1 tablet by mouth daily, Historical      mycophenolic acid (GENERIC EQUIVALENT) 180 MG EC tablet Take 360mg (2 tablets) in the morning, take 180mg (1 tablet) midday, and take 360mg (2 tablets) in the evening., Historical      pantoprazole (PROTONIX) 40 MG EC tablet Take 1 tablet by mouth daily, Historical      !! tacrolimus (GENERIC) 0.5 MG capsule Take 1 capsule (0.5 mg) by mouth daily, Disp-90 capsule, R-3, E-PrescribeTXP DT 4/8/2021 (Kidney) TXP Dischg DT  DX Kidney replaced by transplant Z94.0 [facility] ([place], [place])      vitamin D2 (ERGOCALCIFEROL) 21322 units (1250 mcg) capsule  TAKE 1 CAPSULE BY MOUTH ONE TIME PER WEEK FOR 8 DOSES, Historical       !! - Potential duplicate medications found. Please discuss with provider.        Allergies   No Known Allergies

## 2024-02-13 ENCOUNTER — TELEPHONE (OUTPATIENT)
Dept: TRANSPLANT | Facility: CLINIC | Age: 44
End: 2024-02-13
Payer: COMMERCIAL

## 2024-02-13 DIAGNOSIS — Z94.0 KIDNEY TRANSPLANT RECIPIENT: Primary | ICD-10-CM

## 2024-02-13 DIAGNOSIS — D84.9 IMMUNOSUPPRESSED STATUS (H): ICD-10-CM

## 2024-02-13 NOTE — TELEPHONE ENCOUNTER
ISSUE/PLAN  ----- Message from Rachna King MD sent at 2/11/2024  3:50 PM CST -----  Weekly labs x2 then every 2 weeks x2 then monthly if stable    - Please repeat labs outpatient early next week mon/tues to include BMP, Tac level  - Added on Tcell subset, IgG (ordered for you)  - continue to hold lisinopril till acute ilness.diarrhea improves early next week if back to baseline      OUTCOME  Left detailed voicemail with instructions above. PT [employer] message sent and lab orders updated.

## 2024-02-16 ENCOUNTER — LAB (OUTPATIENT)
Dept: LAB | Facility: CLINIC | Age: 44
End: 2024-02-16
Payer: COMMERCIAL

## 2024-02-16 DIAGNOSIS — Z94.0 KIDNEY TRANSPLANT RECIPIENT: ICD-10-CM

## 2024-02-16 DIAGNOSIS — D84.9 IMMUNOSUPPRESSED STATUS (H): ICD-10-CM

## 2024-02-16 LAB
ANION GAP SERPL CALCULATED.3IONS-SCNC: 11 MMOL/L (ref 7–15)
BUN SERPL-MCNC: 21 MG/DL (ref 6–20)
CALCIUM SERPL-MCNC: 9.5 MG/DL (ref 8.6–10)
CHLORIDE SERPL-SCNC: 105 MMOL/L (ref 98–107)
CREAT SERPL-MCNC: 1.53 MG/DL (ref 0.51–0.95)
DEPRECATED HCO3 PLAS-SCNC: 23 MMOL/L (ref 22–29)
EGFRCR SERPLBLD CKD-EPI 2021: 43 ML/MIN/1.73M2
ERYTHROCYTE [DISTWIDTH] IN BLOOD BY AUTOMATED COUNT: 12.2 % (ref 10–15)
GLUCOSE SERPL-MCNC: 86 MG/DL (ref 70–99)
HCT VFR BLD AUTO: 37 % (ref 35–47)
HGB BLD-MCNC: 12.1 G/DL (ref 11.7–15.7)
MCH RBC QN AUTO: 28.5 PG (ref 26.5–33)
MCHC RBC AUTO-ENTMCNC: 32.7 G/DL (ref 31.5–36.5)
MCV RBC AUTO: 87 FL (ref 78–100)
PLATELET # BLD AUTO: 251 10E3/UL (ref 150–450)
POTASSIUM SERPL-SCNC: 4.5 MMOL/L (ref 3.4–5.3)
RBC # BLD AUTO: 4.24 10E6/UL (ref 3.8–5.2)
SODIUM SERPL-SCNC: 139 MMOL/L (ref 135–145)
TACROLIMUS BLD-MCNC: 6.2 UG/L (ref 5–15)
TME LAST DOSE: NORMAL H
TME LAST DOSE: NORMAL H
WBC # BLD AUTO: 6.8 10E3/UL (ref 4–11)

## 2024-02-16 PROCEDURE — 85027 COMPLETE CBC AUTOMATED: CPT

## 2024-02-16 PROCEDURE — 80048 BASIC METABOLIC PNL TOTAL CA: CPT

## 2024-02-16 PROCEDURE — 36415 COLL VENOUS BLD VENIPUNCTURE: CPT

## 2024-02-16 PROCEDURE — 80197 ASSAY OF TACROLIMUS: CPT

## 2024-02-19 NOTE — PROGRESS NOTES
[facility]  OB/GYN Clinic Note    SUBJECTIVE:                                                   Daly Seaman is a 43 year old female who presents to clinic today for the following health issue(s):  Patient presents with:  Consult: Discuss fibroids    Additional information: Discuss fibroids, 2nd HPV injection    HPI:  Patient is seen today to discuss incidental finding of enlarged fibroid uterus noted on CT A/P done for assessment of abdominal pain, for which she was admitted for prerenal PATRICIA and rotavirus. She has a history of IgA nephropathy and is s/p renal transplant and is immunosuppressed.   A CT A/P in 2021 did not indicate presence of fibroid.   She has a Mirena IUD placed in June and has not had periods with the IUD and is working well for contraception. IUD strings were visualized last month at time of colposcopy.   Denies history of fibroid. Denies bleeding symptoms with IUD. Has some increased bloating occasionally which she attributes to diet or menstrual cycle symptoms. Denies pelvic pain or pressure.     Patient's last menstrual period was 02/20/2024 (exact date)..   Patient is sexually active, No obstetric history on file..  Using IUD for contraception.    reports that she has never smoked. She has never used smokeless tobacco.  STD testing offered?  Declined  Health maintenance updated:  yes    Today's PHQ-2 Score:       2/20/2024     8:02 AM   PHQ-2 ( 1999 [employer])   Q1: Little interest or pleasure in doing things 0   Q2: Feeling down, depressed or hopeless 0   PHQ-2 Score 0       Problem list and histories reviewed & adjusted, as indicated.  Additional history: as documented.    Patient Active Problem List   Diagnosis    Annual physical exam    IgA nephropathy    Kidney replaced by transplant    Thyroid nodule    Elevated BP without diagnosis of hypertension    Cervical cancer screening    Dysplasia of cervix, low grade (LAUREEN 1)    Nausea and vomiting, unspecified  vomiting type     Past Surgical History:   Procedure Laterality Date    ABDOMEN SURGERY  April 2021    IR THYROID BIOPSY  02/04/2020    IR THYROID BIOPSY  09/15/2015    TRANSPLANT  April 2012    kidney      Social History     Tobacco Use    Smoking status: Never    Smokeless tobacco: Never   Substance Use Topics    Alcohol use: Never      Problem (# of Occurrences) Relation (Name,Age of Onset)    Mental Illness (1) Father (Randell)    Genetic Disorder (2) Mother (Digna): Osteogenesis imperfecta, Brother (Joshua): Osteogenesis imperfecta    Thyroid Disease (1) Mother (Digna): hypo thyroid              Current Outpatient Medications   Medication Sig    albuterol (PROAIR HFA/PROVENTIL HFA/VENTOLIN HFA) 108 (90 Base) MCG/ACT inhaler Inhale 2 puffs into the lungs    aspirin 81 MG EC tablet Take 81 mg by mouth daily    biotin 5 MG CAPS Take 1 capsule by mouth    levonorgestrel (MIRENA) 52 MG (20 mcg/day) IUD 1 each by Intrauterine route    lisinopril (ZESTRIL) 2.5 MG tablet Take 1 tablet (2.5 mg) by mouth daily    Multiple Vitamins-Minerals (ONCOVITE) TABS Take 1 tablet by mouth daily    mycophenolic acid (GENERIC EQUIVALENT) 180 MG EC tablet Take 360mg (2 tablets) in the morning, take 180mg (1 tablet) midday, and take 360mg (2 tablets) in the evening.    ondansetron (ZOFRAN ODT) 4 MG ODT tab Take 1 tablet (4 mg) by mouth every 8 hours as needed for nausea    pantoprazole (PROTONIX) 40 MG EC tablet Take 1 tablet by mouth daily    sodium bicarbonate 650 MG tablet Take 1 tablet (650 mg) by mouth 2 times daily    sodium bicarbonate 650 MG tablet Take 1 tablet (650 mg) by mouth 2 times daily    tacrolimus (GENERIC) 0.5 MG capsule Take 1 capsule (0.5 mg) by mouth daily    tacrolimus (GENERIC) 1 MG capsule Take 2 capsules (2 mg) by mouth 2 times daily    vitamin D2 (ERGOCALCIFEROL) 67020 units (1250 mcg) capsule TAKE 1 CAPSULE BY MOUTH ONE TIME PER WEEK FOR 8 DOSES     No current facility-administered medications for this visit.  "    Allergies   Allergen Reactions    Wasp Venom Protein Unknown         OBJECTIVE:     /60   Ht 1.791 m (5' 10.5\")   Wt 63.3 kg (139 lb 9.6 oz)   LMP 02/20/2024 (Exact Date)   BMI 19.75 kg/m    Body mass index is 19.75 kg/m .    Exam:  Constitutional:  Appearance: Well nourished, well developed alert, in no acute distress     CT ABDOMEN PELVIS W/O CONTRAST 02/09/2024    Narrative  EXAMINATION: CT ABDOMEN PELVIS W/O CONTRAST, 2/9/2024 12:20 PM    INDICATION: abd pain    COMPARISON STUDY: None.    TECHNIQUE: CT scan of the abdomen and pelvis was performed on  multidetector CT scanner using volumetric acquisition technique and  images were reconstructed in multiple planes with variable thickness  and reviewed on dedicated workstations.    CONTRAST: None.    CT scan radiation dose is optimized to minimum requisite dose using  automated dose modulation techniques.    FINDINGS:    Lower thorax: No consolidations. Punctate calcified granuloma in the  right lung base.    Liver: No mass within the limitations of noncontrast technique. No  intrahepatic biliary ductal dilation.    Biliary System: Normal gallbladder. No extrahepatic biliary ductal  dilation.    Pancreas: No pancreatic ductal dilation.    Adrenal glands: No mass or nodules    Spleen: Normal. Small splenule.    Kidneys: Atrophic native kidneys. Normal appearance of right lower  quadrant transplant kidney. No obstructing calculus or hydronephrosis.    Gastrointestinal tract :Normal appendix. No dilation of small and  large bowel loops. 0.9 cm hyperdensity in the right upper quadrant is  likely ingested material within the duodenum.    Mesentery/peritoneum/retroperitoneum: No mass. No free fluid or air.    Lymph nodes: No significant lymphadenopathy.    Vasculature: Normal caliber aorta.    Pelvis: Urinary bladder is decompressed.  Enlarged bulky uterus.  Indeterminate location of the intrauterine device within the lower  uterine segment.    Osseous " structures: No aggressive or acute osseous lesion.    Soft tissues: Within normal limits.    Impression  IMPRESSION:  1. No acute findings in the abdomen or pelvis.  2. Bulky enlarged uterus with indeterminate position of the IUD.  Recommend further assessment with pelvic ultrasound.    EMILY ESCOBEDO MD      SYSTEM ID:  [id number]      US PELVIS COMPLETE W TRANSVAGINAL AND DOPPLER LIMITED 02/09/2024    Narrative  EXAMINATION: US PELVIS COMPLETE W TRANSVAGINAL AND DOPPLER LIMITED,  2/9/2024 2:22 PM    COMPARISON: CT abdomen 2/9/2024    HISTORY: pelvic pain    TECHNIQUE: The pelvis was scanned in standard fashion with  transabdominal and transvaginal transducer(s) using both grey scale  and spectral flow and  color Doppler techniques.    FINDINGS:    The uterus measures 14.2 x 7.5 x 8.7 cm. Large heterogenous mass  distorting the uterus measures about 9.3 x 7 x 7.8 cm, the endometrial  cavity is not well visualized, the intrauterine device is seen in the  lower uterine segment and is possibly malpositioned. No free fluid is  demonstrated.    The right ovary measures 5.7 x 3.4 x 3.0 cm.; Volume 30 mL, cyst with  internal echoes in the right ovary measures 3.3 x 2.8 x 1.7 cm.  The left ovary measures 4.8 x 2.2 x 3.4 cm. ; Volume 19 mL; prominent  follicle measuring 1.7 cm.; Bilateral adnexal blood flow on color and  spectral Doppler.    Impression  IMPRESSION:    1. Large uterine echogenicity possibly fibroid measuring up to 9.3 cm,  endometrial cavity is not well visualized, possibly distorted with  mass effect;  intrauterine device apparently in the lower uterine  segment, possibly malpositioned, versus apparent displacement  secondary to mass effect from the fibroid, may consider pelvic 3D  ultrasound/MR for further evaluation as  clinically desired    2. Right ovarian cyst with internal echoes measuring up to 3.3 cm, may  consider attention on follow-up. Bilateral adnexal blood flow on color  and spectral Doppler,  presence of blood flow does not necessarily rule  out partial/intermittent torsion, if concern for ovarian torsion  consider gynecology consult.  I have personally reviewed the examination and initial interpretation  and I agree with the findings.    FRIEDA MOSES MD      SYSTEM ID:  [id number]      ASSESSMENT/PLAN:                                                        ICD-10-CM    1. Uterine leiomyoma, unspecified location  D25.9 MR Pelvis (GYN) wo Contrast      2. IUD check up  Z30.431           Daly Seaman is a 43 year old with incidental finding of enlarged fibroid. Discussed fibroids in detail today. CT scan and US images were personally reviewed. Enlarged fundal fibroid is seen. IUD appears to be positioned in cavity of uterus. Discussed MRI pelvis for further classification of fibroid, r/o worrisome features of fibroid, and to insure proper positioning of IUD. Ordered w/o contrast due to renal dysfunction- however Cr has stabilized. Will leave up to the discretion of radiologist if contrast is indicated.   If fibroid is normal in appearance and IUD well positioned, no specific follow-up is needed unless developing increasing bulk symptoms and/or AUB. Discussed option of monitoring fibroid yearly for a period of time via US to insure it is not enlarging.   Patient is following up in one year for repeat pap due to hx of abnormal. At that time, can assess fibroid symptoms and determine need for US.   Due for second dose of HPV vaccine, which is provided today.   All questions answered and patient is understanding of plan of care.     Paty Unger MD, S  [facility] [place]  02/20/24

## 2024-02-20 ENCOUNTER — OFFICE VISIT (OUTPATIENT)
Dept: OBGYN | Facility: CLINIC | Age: 44
End: 2024-02-20
Payer: COMMERCIAL

## 2024-02-20 VITALS
SYSTOLIC BLOOD PRESSURE: 102 MMHG | HEIGHT: 71 IN | DIASTOLIC BLOOD PRESSURE: 60 MMHG | BODY MASS INDEX: 19.54 KG/M2 | WEIGHT: 139.6 LBS

## 2024-02-20 DIAGNOSIS — Z23 NEED FOR VACCINATION: ICD-10-CM

## 2024-02-20 DIAGNOSIS — D25.9 UTERINE LEIOMYOMA, UNSPECIFIED LOCATION: Primary | ICD-10-CM

## 2024-02-20 DIAGNOSIS — Z30.431 IUD CHECK UP: ICD-10-CM

## 2024-02-20 DIAGNOSIS — Z23 NEED FOR HPV VACCINATION: ICD-10-CM

## 2024-02-20 PROCEDURE — 90651 9VHPV VACCINE 2/3 DOSE IM: CPT | Performed by: STUDENT IN AN ORGANIZED HEALTH CARE EDUCATION/TRAINING PROGRAM

## 2024-02-20 PROCEDURE — 90471 IMMUNIZATION ADMIN: CPT | Performed by: STUDENT IN AN ORGANIZED HEALTH CARE EDUCATION/TRAINING PROGRAM

## 2024-02-20 PROCEDURE — 99213 OFFICE O/P EST LOW 20 MIN: CPT | Mod: 25 | Performed by: STUDENT IN AN ORGANIZED HEALTH CARE EDUCATION/TRAINING PROGRAM

## 2024-02-21 ENCOUNTER — LAB (OUTPATIENT)
Dept: LAB | Facility: CLINIC | Age: 44
End: 2024-02-21
Payer: COMMERCIAL

## 2024-02-21 DIAGNOSIS — Z94.0 KIDNEY TRANSPLANT RECIPIENT: ICD-10-CM

## 2024-02-21 DIAGNOSIS — D84.9 IMMUNOSUPPRESSED STATUS (H): ICD-10-CM

## 2024-02-21 LAB
ANION GAP SERPL CALCULATED.3IONS-SCNC: 9 MMOL/L (ref 7–15)
BUN SERPL-MCNC: 17.3 MG/DL (ref 6–20)
CALCIUM SERPL-MCNC: 9.4 MG/DL (ref 8.6–10)
CHLORIDE SERPL-SCNC: 108 MMOL/L (ref 98–107)
CREAT SERPL-MCNC: 1.64 MG/DL (ref 0.51–0.95)
DEPRECATED HCO3 PLAS-SCNC: 24 MMOL/L (ref 22–29)
EGFRCR SERPLBLD CKD-EPI 2021: 39 ML/MIN/1.73M2
ERYTHROCYTE [DISTWIDTH] IN BLOOD BY AUTOMATED COUNT: 12.4 % (ref 10–15)
GLUCOSE SERPL-MCNC: 78 MG/DL (ref 70–99)
HCT VFR BLD AUTO: 34.4 % (ref 35–47)
HGB BLD-MCNC: 10.9 G/DL (ref 11.7–15.7)
MCH RBC QN AUTO: 28.5 PG (ref 26.5–33)
MCHC RBC AUTO-ENTMCNC: 31.7 G/DL (ref 31.5–36.5)
MCV RBC AUTO: 90 FL (ref 78–100)
PLATELET # BLD AUTO: 246 10E3/UL (ref 150–450)
POTASSIUM SERPL-SCNC: 4.1 MMOL/L (ref 3.4–5.3)
RBC # BLD AUTO: 3.83 10E6/UL (ref 3.8–5.2)
SODIUM SERPL-SCNC: 141 MMOL/L (ref 135–145)
TACROLIMUS BLD-MCNC: 5.5 UG/L (ref 5–15)
TME LAST DOSE: NORMAL H
TME LAST DOSE: NORMAL H
WBC # BLD AUTO: 4.6 10E3/UL (ref 4–11)

## 2024-02-21 PROCEDURE — 80197 ASSAY OF TACROLIMUS: CPT

## 2024-02-21 PROCEDURE — 85027 COMPLETE CBC AUTOMATED: CPT

## 2024-02-21 PROCEDURE — 36415 COLL VENOUS BLD VENIPUNCTURE: CPT

## 2024-02-21 PROCEDURE — 80048 BASIC METABOLIC PNL TOTAL CA: CPT

## 2024-03-05 ENCOUNTER — TELEPHONE (OUTPATIENT)
Dept: TRANSPLANT | Facility: CLINIC | Age: 44
End: 2024-03-05
Payer: COMMERCIAL

## 2024-03-05 ENCOUNTER — ANCILLARY PROCEDURE (OUTPATIENT)
Dept: MRI IMAGING | Facility: CLINIC | Age: 44
End: 2024-03-05
Attending: STUDENT IN AN ORGANIZED HEALTH CARE EDUCATION/TRAINING PROGRAM
Payer: COMMERCIAL

## 2024-03-05 DIAGNOSIS — D25.9 UTERINE LEIOMYOMA, UNSPECIFIED LOCATION: ICD-10-CM

## 2024-03-05 DIAGNOSIS — Z48.298 AFTERCARE FOLLOWING ORGAN TRANSPLANT: Primary | ICD-10-CM

## 2024-03-05 PROCEDURE — A9585 GADOBUTROL INJECTION: HCPCS | Performed by: STUDENT IN AN ORGANIZED HEALTH CARE EDUCATION/TRAINING PROGRAM

## 2024-03-05 PROCEDURE — 255N000002 HC RX 255 OP 636: Performed by: STUDENT IN AN ORGANIZED HEALTH CARE EDUCATION/TRAINING PROGRAM

## 2024-03-05 PROCEDURE — 72197 MRI PELVIS W/O & W/DYE: CPT

## 2024-03-05 RX ORDER — MYCOPHENOLIC ACID 180 MG/1
TABLET, DELAYED RELEASE ORAL
Qty: 150 TABLET | Refills: 11 | Status: SHIPPED | OUTPATIENT
Start: 2024-03-05

## 2024-03-05 RX ORDER — GADOBUTROL 604.72 MG/ML
6 INJECTION INTRAVENOUS ONCE
Status: COMPLETED | OUTPATIENT
Start: 2024-03-05 | End: 2024-03-05

## 2024-03-05 RX ADMIN — GADOBUTROL 6 ML: 604.72 INJECTION INTRAVENOUS at 07:03

## 2024-03-05 NOTE — TELEPHONE ENCOUNTER
Provider Call: Medication Refill  Route to LPN  Pharmacy Name: [facility]  Pharmacy Location: Mail order  - she is having computer issues to find location    Name of Medication: Mycophenolate Dose: 360 mg  30 day with refills-   When will the patient be out of this medication?: Less than 3 days (Route high priority)  Callback needed? Yes- unable to find location to escribe     Return Call Needed  Same as documented in contacts section  When to return call?: Same day: Route High Priority

## 2024-04-19 ENCOUNTER — TELEPHONE (OUTPATIENT)
Dept: TRANSPLANT | Facility: CLINIC | Age: 44
End: 2024-04-19

## 2024-04-19 ENCOUNTER — LAB (OUTPATIENT)
Dept: LAB | Facility: CLINIC | Age: 44
End: 2024-04-19
Payer: COMMERCIAL

## 2024-04-19 DIAGNOSIS — D84.9 IMMUNOSUPPRESSED STATUS (H): ICD-10-CM

## 2024-04-19 DIAGNOSIS — Z94.0 KIDNEY TRANSPLANT RECIPIENT: ICD-10-CM

## 2024-04-19 DIAGNOSIS — Z48.298 AFTERCARE FOLLOWING ORGAN TRANSPLANT: ICD-10-CM

## 2024-04-19 DIAGNOSIS — Z20.828 CONTACT WITH AND (SUSPECTED) EXPOSURE TO OTHER VIRAL COMMUNICABLE DISEASES: ICD-10-CM

## 2024-04-19 DIAGNOSIS — Z79.899 ENCOUNTER FOR LONG-TERM CURRENT USE OF MEDICATION: ICD-10-CM

## 2024-04-19 DIAGNOSIS — Z48.22 ENCOUNTER FOR AFTERCARE FOLLOWING KIDNEY TRANSPLANT: Primary | ICD-10-CM

## 2024-04-19 DIAGNOSIS — Z94.0 KIDNEY REPLACED BY TRANSPLANT: ICD-10-CM

## 2024-04-19 DIAGNOSIS — Z98.890 OTHER SPECIFIED POSTPROCEDURAL STATES: ICD-10-CM

## 2024-04-19 LAB
ERYTHROCYTE [DISTWIDTH] IN BLOOD BY AUTOMATED COUNT: 12.8 % (ref 10–15)
HCT VFR BLD AUTO: 38.6 % (ref 35–47)
HGB BLD-MCNC: 12 G/DL (ref 11.7–15.7)
MCH RBC QN AUTO: 28 PG (ref 26.5–33)
MCHC RBC AUTO-ENTMCNC: 31.1 G/DL (ref 31.5–36.5)
MCV RBC AUTO: 90 FL (ref 78–100)
PLATELET # BLD AUTO: 220 10E3/UL (ref 150–450)
RBC # BLD AUTO: 4.28 10E6/UL (ref 3.8–5.2)
TACROLIMUS BLD-MCNC: 4.9 UG/L (ref 5–15)
TME LAST DOSE: ABNORMAL H
TME LAST DOSE: ABNORMAL H
WBC # BLD AUTO: 16.3 10E3/UL (ref 4–11)

## 2024-04-19 PROCEDURE — 80048 BASIC METABOLIC PNL TOTAL CA: CPT

## 2024-04-19 PROCEDURE — 36415 COLL VENOUS BLD VENIPUNCTURE: CPT

## 2024-04-19 PROCEDURE — 84156 ASSAY OF PROTEIN URINE: CPT

## 2024-04-19 PROCEDURE — 85027 COMPLETE CBC AUTOMATED: CPT

## 2024-04-19 PROCEDURE — 80197 ASSAY OF TACROLIMUS: CPT

## 2024-04-19 NOTE — TELEPHONE ENCOUNTER
ISSUE  WBC 16    PLAN  Call patient and assess for infectious symptoms    OUTCOME  Daly reports that she has had a cold for the last 4 days. She has a low grade temp, sinus congestion, and night sweats. No swollen lymph nodes. She reports that she is feeling improved from yesterday, and is trying to stay on top of her hydration. BMP has not resulted, but encouraged fluids. RVP, CMV and EBV ordered- patient will schedule labs and call on call/go to ER if symptoms worsen.

## 2024-04-20 ENCOUNTER — LAB (OUTPATIENT)
Dept: LAB | Facility: CLINIC | Age: 44
End: 2024-04-20
Attending: INTERNAL MEDICINE
Payer: COMMERCIAL

## 2024-04-20 DIAGNOSIS — Z48.22 ENCOUNTER FOR AFTERCARE FOLLOWING KIDNEY TRANSPLANT: ICD-10-CM

## 2024-04-20 DIAGNOSIS — D84.9 IMMUNOSUPPRESSED STATUS (H): ICD-10-CM

## 2024-04-20 DIAGNOSIS — Z94.0 KIDNEY TRANSPLANT RECIPIENT: ICD-10-CM

## 2024-04-20 LAB
ALBUMIN MFR UR ELPH: 33 MG/DL
ANION GAP SERPL CALCULATED.3IONS-SCNC: 13 MMOL/L (ref 7–15)
BUN SERPL-MCNC: 17.9 MG/DL (ref 6–20)
C PNEUM DNA SPEC QL NAA+PROBE: NOT DETECTED
CALCIUM SERPL-MCNC: 9.6 MG/DL (ref 8.6–10)
CHLORIDE SERPL-SCNC: 106 MMOL/L (ref 98–107)
CREAT SERPL-MCNC: 1.6 MG/DL (ref 0.51–0.95)
CREAT UR-MCNC: 241 MG/DL
DEPRECATED HCO3 PLAS-SCNC: 21 MMOL/L (ref 22–29)
EGFRCR SERPLBLD CKD-EPI 2021: 41 ML/MIN/1.73M2
FLUAV H1 2009 PAND RNA SPEC QL NAA+PROBE: NOT DETECTED
FLUAV H1 RNA SPEC QL NAA+PROBE: NOT DETECTED
FLUAV H3 RNA SPEC QL NAA+PROBE: NOT DETECTED
FLUAV RNA SPEC QL NAA+PROBE: NOT DETECTED
FLUBV RNA SPEC QL NAA+PROBE: NOT DETECTED
GLUCOSE SERPL-MCNC: 119 MG/DL (ref 70–99)
HADV DNA SPEC QL NAA+PROBE: NOT DETECTED
HCOV PNL SPEC NAA+PROBE: NOT DETECTED
HMPV RNA SPEC QL NAA+PROBE: NOT DETECTED
HPIV1 RNA SPEC QL NAA+PROBE: NOT DETECTED
HPIV2 RNA SPEC QL NAA+PROBE: NOT DETECTED
HPIV3 RNA SPEC QL NAA+PROBE: NOT DETECTED
HPIV4 RNA SPEC QL NAA+PROBE: NOT DETECTED
M PNEUMO DNA SPEC QL NAA+PROBE: NOT DETECTED
POTASSIUM SERPL-SCNC: 4.1 MMOL/L (ref 3.4–5.3)
PROT/CREAT 24H UR: 0.14 MG/MG CR (ref 0–0.2)
RSV RNA SPEC QL NAA+PROBE: NOT DETECTED
RSV RNA SPEC QL NAA+PROBE: NOT DETECTED
RV+EV RNA SPEC QL NAA+PROBE: NOT DETECTED
SODIUM SERPL-SCNC: 140 MMOL/L (ref 135–145)

## 2024-04-20 PROCEDURE — 87486 CHLMYD PNEUM DNA AMP PROBE: CPT

## 2024-04-20 PROCEDURE — 87581 M.PNEUMON DNA AMP PROBE: CPT

## 2024-04-20 PROCEDURE — 86665 EPSTEIN-BARR CAPSID VCA: CPT

## 2024-04-20 PROCEDURE — 87633 RESP VIRUS 12-25 TARGETS: CPT

## 2024-04-20 PROCEDURE — 36415 COLL VENOUS BLD VENIPUNCTURE: CPT

## 2024-04-21 LAB — CMV DNA SPEC NAA+PROBE-ACNC: NOT DETECTED IU/ML

## 2024-04-22 LAB
EBV VCA IGG SER IA-ACNC: >750 U/ML
EBV VCA IGG SER IA-ACNC: POSITIVE

## 2024-04-25 ENCOUNTER — TELEPHONE (OUTPATIENT)
Dept: TRANSPLANT | Facility: CLINIC | Age: 44
End: 2024-04-25
Payer: COMMERCIAL

## 2024-04-25 DIAGNOSIS — Z48.22 ENCOUNTER FOR AFTERCARE FOLLOWING KIDNEY TRANSPLANT: Primary | ICD-10-CM

## 2024-04-25 DIAGNOSIS — Z94.0 KIDNEY TRANSPLANT RECIPIENT: ICD-10-CM

## 2024-04-25 DIAGNOSIS — D84.9 IMMUNOSUPPRESSED STATUS (H): ICD-10-CM

## 2024-04-30 ENCOUNTER — ANCILLARY PROCEDURE (OUTPATIENT)
Dept: GENERAL RADIOLOGY | Facility: CLINIC | Age: 44
End: 2024-04-30
Attending: INTERNAL MEDICINE
Payer: COMMERCIAL

## 2024-04-30 DIAGNOSIS — R05.8 PRODUCTIVE COUGH: ICD-10-CM

## 2024-04-30 DIAGNOSIS — D84.9 IMMUNOSUPPRESSED STATUS (H): ICD-10-CM

## 2024-04-30 DIAGNOSIS — Z94.0 KIDNEY TRANSPLANT RECIPIENT: ICD-10-CM

## 2024-04-30 DIAGNOSIS — Z48.22 ENCOUNTER FOR AFTERCARE FOLLOWING KIDNEY TRANSPLANT: Primary | ICD-10-CM

## 2024-04-30 DIAGNOSIS — Z48.22 ENCOUNTER FOR AFTERCARE FOLLOWING KIDNEY TRANSPLANT: ICD-10-CM

## 2024-04-30 PROCEDURE — 71046 X-RAY EXAM CHEST 2 VIEWS: CPT | Mod: TC | Performed by: RADIOLOGY

## 2024-06-20 ENCOUNTER — LAB (OUTPATIENT)
Dept: LAB | Facility: CLINIC | Age: 44
End: 2024-06-20
Payer: COMMERCIAL

## 2024-06-20 ENCOUNTER — ALLIED HEALTH/NURSE VISIT (OUTPATIENT)
Dept: OBGYN | Facility: CLINIC | Age: 44
End: 2024-06-20
Payer: COMMERCIAL

## 2024-06-20 DIAGNOSIS — Z20.828 CONTACT WITH AND (SUSPECTED) EXPOSURE TO OTHER VIRAL COMMUNICABLE DISEASES: ICD-10-CM

## 2024-06-20 DIAGNOSIS — Z48.298 AFTERCARE FOLLOWING ORGAN TRANSPLANT: ICD-10-CM

## 2024-06-20 DIAGNOSIS — Z94.0 KIDNEY REPLACED BY TRANSPLANT: ICD-10-CM

## 2024-06-20 DIAGNOSIS — Z79.899 ENCOUNTER FOR LONG-TERM CURRENT USE OF MEDICATION: ICD-10-CM

## 2024-06-20 DIAGNOSIS — Z23 NEED FOR HPV VACCINATION: Primary | ICD-10-CM

## 2024-06-20 DIAGNOSIS — Z98.890 OTHER SPECIFIED POSTPROCEDURAL STATES: ICD-10-CM

## 2024-06-20 LAB
ANION GAP SERPL CALCULATED.3IONS-SCNC: 9 MMOL/L (ref 7–15)
BUN SERPL-MCNC: 21.1 MG/DL (ref 6–20)
CALCIUM SERPL-MCNC: 9.5 MG/DL (ref 8.6–10)
CHLORIDE SERPL-SCNC: 109 MMOL/L (ref 98–107)
CREAT SERPL-MCNC: 1.53 MG/DL (ref 0.51–0.95)
DEPRECATED HCO3 PLAS-SCNC: 22 MMOL/L (ref 22–29)
EGFRCR SERPLBLD CKD-EPI 2021: 43 ML/MIN/1.73M2
ERYTHROCYTE [DISTWIDTH] IN BLOOD BY AUTOMATED COUNT: 12.5 % (ref 10–15)
GLUCOSE SERPL-MCNC: 78 MG/DL (ref 70–99)
HCT VFR BLD AUTO: 38 % (ref 35–47)
HGB BLD-MCNC: 11.7 G/DL (ref 11.7–15.7)
MCH RBC QN AUTO: 28.1 PG (ref 26.5–33)
MCHC RBC AUTO-ENTMCNC: 30.8 G/DL (ref 31.5–36.5)
MCV RBC AUTO: 91 FL (ref 78–100)
PLATELET # BLD AUTO: 244 10E3/UL (ref 150–450)
POTASSIUM SERPL-SCNC: 4.8 MMOL/L (ref 3.4–5.3)
RBC # BLD AUTO: 4.16 10E6/UL (ref 3.8–5.2)
SODIUM SERPL-SCNC: 140 MMOL/L (ref 135–145)
TACROLIMUS BLD-MCNC: 5.1 UG/L (ref 5–15)
TME LAST DOSE: NORMAL H
TME LAST DOSE: NORMAL H
WBC # BLD AUTO: 5.7 10E3/UL (ref 4–11)

## 2024-06-20 PROCEDURE — 80048 BASIC METABOLIC PNL TOTAL CA: CPT

## 2024-06-20 PROCEDURE — 80197 ASSAY OF TACROLIMUS: CPT

## 2024-06-20 PROCEDURE — 36415 COLL VENOUS BLD VENIPUNCTURE: CPT

## 2024-06-20 PROCEDURE — 90471 IMMUNIZATION ADMIN: CPT

## 2024-06-20 PROCEDURE — 85027 COMPLETE CBC AUTOMATED: CPT

## 2024-06-20 PROCEDURE — 90651 9VHPV VACCINE 2/3 DOSE IM: CPT

## 2024-06-20 PROCEDURE — 99207 PR NO CHARGE NURSE ONLY: CPT

## 2024-06-20 NOTE — PROGRESS NOTES
Prior to immunization administration, verified patients identity using patient s name and date of birth. Please see Immunization Activity for additional information.     Screening Questionnaire for Adult Immunization    Are you sick today?   No   Do you have allergies to medications, food, a vaccine component or latex?   No   Have you ever had a serious reaction after receiving a vaccination?   No   Do you have a long-term health problem with heart, lung, kidney, or metabolic disease (e.g., diabetes), asthma, a blood disorder, no spleen, complement component deficiency, a cochlear implant, or a spinal fluid leak?  Are you on long-term aspirin therapy?   No   Do you have cancer, leukemia, HIV/AIDS, or any other immune system problem?   No   Do you have a parent, brother, or sister with an immune system problem?   No   In the past 3 months, have you taken medications that affect  your immune system, such as prednisone, other steroids, or anticancer drugs; drugs for the treatment of rheumatoid arthritis, Crohn s disease, or psoriasis; or have you had radiation treatments?   No   Have you had a seizure, or a brain or other nervous system problem?   No   During the past year, have you received a transfusion of blood or blood    products, or been given immune (gamma) globulin or antiviral drug?   No   For women: Are you pregnant or is there a chance you could become       pregnant during the next month?   No   Have you received any vaccinations in the past 4 weeks?   No     Immunization questionnaire answers were all negative.    I have reviewed the following standing orders:   This patient is due and qualifies for the HPV vaccine.    Click here for HPV (Adult 15-45Y) Standing Order     I have reviewed the vaccines inclusion and exclusion criteria;No concerns regarding eligibility.       Patient instructed to remain in clinic for 15 minutes afterwards, and to report any adverse reactions.     Screening performed by  Liz Lovelace MA on 6/20/2024 at 8:34 AM.

## 2024-07-23 ENCOUNTER — TELEPHONE (OUTPATIENT)
Dept: NEPHROLOGY | Facility: CLINIC | Age: 44
End: 2024-07-23
Payer: COMMERCIAL

## 2024-07-23 NOTE — TELEPHONE ENCOUNTER
[facility] Call Center    Phone Message    May a detailed message be left on voicemail: yes     Reason for Call: Other: Please place lab order per . Thanks.     Action Taken: Other:   [facility] NEPHROLOGY    Travel Screening: Not Applicable     Date of Service:

## 2024-08-23 ENCOUNTER — LAB (OUTPATIENT)
Dept: LAB | Facility: CLINIC | Age: 44
End: 2024-08-23
Payer: COMMERCIAL

## 2024-08-23 DIAGNOSIS — Z79.899 ENCOUNTER FOR LONG-TERM CURRENT USE OF MEDICATION: ICD-10-CM

## 2024-08-23 DIAGNOSIS — Z20.828 CONTACT WITH AND (SUSPECTED) EXPOSURE TO OTHER VIRAL COMMUNICABLE DISEASES: ICD-10-CM

## 2024-08-23 DIAGNOSIS — Z98.890 OTHER SPECIFIED POSTPROCEDURAL STATES: ICD-10-CM

## 2024-08-23 DIAGNOSIS — Z94.0 KIDNEY REPLACED BY TRANSPLANT: ICD-10-CM

## 2024-08-23 DIAGNOSIS — Z48.298 AFTERCARE FOLLOWING ORGAN TRANSPLANT: ICD-10-CM

## 2024-08-23 LAB
ANION GAP SERPL CALCULATED.3IONS-SCNC: 13 MMOL/L (ref 7–15)
BUN SERPL-MCNC: 25.8 MG/DL (ref 6–20)
CALCIUM SERPL-MCNC: 9.6 MG/DL (ref 8.8–10.4)
CHLORIDE SERPL-SCNC: 108 MMOL/L (ref 98–107)
CREAT SERPL-MCNC: 1.5 MG/DL (ref 0.51–0.95)
EGFRCR SERPLBLD CKD-EPI 2021: 44 ML/MIN/1.73M2
ERYTHROCYTE [DISTWIDTH] IN BLOOD BY AUTOMATED COUNT: 12.1 % (ref 10–15)
GLUCOSE SERPL-MCNC: 77 MG/DL (ref 70–99)
HCO3 SERPL-SCNC: 20 MMOL/L (ref 22–29)
HCT VFR BLD AUTO: 36.6 % (ref 35–47)
HGB BLD-MCNC: 11.7 G/DL (ref 11.7–15.7)
MCH RBC QN AUTO: 28.7 PG (ref 26.5–33)
MCHC RBC AUTO-ENTMCNC: 32 G/DL (ref 31.5–36.5)
MCV RBC AUTO: 90 FL (ref 78–100)
PLATELET # BLD AUTO: 216 10E3/UL (ref 150–450)
POTASSIUM SERPL-SCNC: 4.4 MMOL/L (ref 3.4–5.3)
RBC # BLD AUTO: 4.07 10E6/UL (ref 3.8–5.2)
SODIUM SERPL-SCNC: 141 MMOL/L (ref 135–145)
TACROLIMUS BLD-MCNC: 4.6 UG/L (ref 5–15)
TME LAST DOSE: ABNORMAL H
TME LAST DOSE: ABNORMAL H
WBC # BLD AUTO: 5.7 10E3/UL (ref 4–11)

## 2024-08-23 PROCEDURE — 36415 COLL VENOUS BLD VENIPUNCTURE: CPT

## 2024-08-23 PROCEDURE — 80048 BASIC METABOLIC PNL TOTAL CA: CPT

## 2024-08-23 PROCEDURE — 85027 COMPLETE CBC AUTOMATED: CPT

## 2024-08-23 PROCEDURE — 80197 ASSAY OF TACROLIMUS: CPT

## 2024-09-23 ENCOUNTER — MYC REFILL (OUTPATIENT)
Dept: FAMILY MEDICINE | Facility: CLINIC | Age: 44
End: 2024-09-23
Payer: COMMERCIAL

## 2024-09-23 DIAGNOSIS — K21.9 GASTROESOPHAGEAL REFLUX DISEASE WITHOUT ESOPHAGITIS: Primary | ICD-10-CM

## 2024-09-26 RX ORDER — PANTOPRAZOLE SODIUM 40 MG/1
40 TABLET, DELAYED RELEASE ORAL DAILY
Qty: 90 TABLET | Refills: 3 | Status: SHIPPED | OUTPATIENT
Start: 2024-09-26

## 2024-10-01 NOTE — TELEPHONE ENCOUNTER
Patient called the clinic wondering if her medication has been refilled. Informed patient that it was sent to the pharmacy. She stated understanding and had no further questions.    Krystina SAMUELS RN  [facility] Triage Team

## 2024-10-02 DIAGNOSIS — D84.9 IMMUNOSUPPRESSED STATUS (H): ICD-10-CM

## 2024-10-02 DIAGNOSIS — Z94.0 KIDNEY TRANSPLANTED: ICD-10-CM

## 2024-10-02 RX ORDER — TACROLIMUS 1 MG/1
2 CAPSULE ORAL 2 TIMES DAILY
Qty: 120 CAPSULE | Refills: 11 | Status: SHIPPED | OUTPATIENT
Start: 2024-10-02

## 2024-10-02 RX ORDER — TACROLIMUS 0.5 MG/1
0.5 CAPSULE ORAL DAILY
Qty: 30 CAPSULE | Refills: 11 | Status: SHIPPED | OUTPATIENT
Start: 2024-10-02

## 2024-10-15 ENCOUNTER — PATIENT OUTREACH (OUTPATIENT)
Dept: CARE COORDINATION | Facility: CLINIC | Age: 44
End: 2024-10-15
Payer: COMMERCIAL

## 2024-10-29 ENCOUNTER — PATIENT OUTREACH (OUTPATIENT)
Dept: CARE COORDINATION | Facility: CLINIC | Age: 44
End: 2024-10-29
Payer: COMMERCIAL

## 2024-10-30 ENCOUNTER — PATIENT OUTREACH (OUTPATIENT)
Dept: CARE COORDINATION | Facility: CLINIC | Age: 44
End: 2024-10-30
Payer: COMMERCIAL

## 2024-11-14 ENCOUNTER — ANCILLARY PROCEDURE (OUTPATIENT)
Dept: GENERAL RADIOLOGY | Facility: CLINIC | Age: 44
End: 2024-11-14
Attending: INTERNAL MEDICINE
Payer: COMMERCIAL

## 2024-11-14 ENCOUNTER — OFFICE VISIT (OUTPATIENT)
Dept: FAMILY MEDICINE | Facility: CLINIC | Age: 44
End: 2024-11-14
Attending: INTERNAL MEDICINE
Payer: COMMERCIAL

## 2024-11-14 VITALS
SYSTOLIC BLOOD PRESSURE: 136 MMHG | BODY MASS INDEX: 21.11 KG/M2 | TEMPERATURE: 98.4 F | RESPIRATION RATE: 14 BRPM | HEIGHT: 70 IN | OXYGEN SATURATION: 99 % | DIASTOLIC BLOOD PRESSURE: 72 MMHG | HEART RATE: 79 BPM | WEIGHT: 147.5 LBS

## 2024-11-14 DIAGNOSIS — G89.29 CHRONIC PAIN OF LEFT KNEE: ICD-10-CM

## 2024-11-14 DIAGNOSIS — R53.83 OTHER FATIGUE: ICD-10-CM

## 2024-11-14 DIAGNOSIS — D84.9 IMMUNOSUPPRESSION (H): Primary | ICD-10-CM

## 2024-11-14 DIAGNOSIS — N18.31 STAGE 3A CHRONIC KIDNEY DISEASE (H): ICD-10-CM

## 2024-11-14 DIAGNOSIS — Z94.0 KIDNEY TRANSPLANT RECIPIENT: ICD-10-CM

## 2024-11-14 DIAGNOSIS — M25.562 CHRONIC PAIN OF LEFT KNEE: ICD-10-CM

## 2024-11-14 DIAGNOSIS — E55.9 VITAMIN D DEFICIENCY: ICD-10-CM

## 2024-11-14 DIAGNOSIS — D68.51 HETEROZYGOUS FACTOR V LEIDEN MUTATION (H): ICD-10-CM

## 2024-11-14 DIAGNOSIS — E21.3 HYPERPARATHYROIDISM (H): ICD-10-CM

## 2024-11-14 LAB
FERRITIN SERPL-MCNC: 85 NG/ML (ref 6–175)
IRON BINDING CAPACITY (ROCHE): 231 UG/DL (ref 240–430)
IRON SATN MFR SERPL: 27 % (ref 15–46)
IRON SERPL-MCNC: 62 UG/DL (ref 37–145)
TSH SERPL DL<=0.005 MIU/L-ACNC: 1.71 UIU/ML (ref 0.3–4.2)
VIT D+METAB SERPL-MCNC: 45 NG/ML (ref 20–50)

## 2024-11-14 PROCEDURE — 83540 ASSAY OF IRON: CPT | Performed by: INTERNAL MEDICINE

## 2024-11-14 PROCEDURE — 80048 BASIC METABOLIC PNL TOTAL CA: CPT | Performed by: INTERNAL MEDICINE

## 2024-11-14 PROCEDURE — 82728 ASSAY OF FERRITIN: CPT | Performed by: INTERNAL MEDICINE

## 2024-11-14 PROCEDURE — 82306 VITAMIN D 25 HYDROXY: CPT | Performed by: INTERNAL MEDICINE

## 2024-11-14 PROCEDURE — 73562 X-RAY EXAM OF KNEE 3: CPT | Mod: TC | Performed by: STUDENT IN AN ORGANIZED HEALTH CARE EDUCATION/TRAINING PROGRAM

## 2024-11-14 PROCEDURE — 36415 COLL VENOUS BLD VENIPUNCTURE: CPT | Performed by: INTERNAL MEDICINE

## 2024-11-14 PROCEDURE — 84443 ASSAY THYROID STIM HORMONE: CPT | Performed by: INTERNAL MEDICINE

## 2024-11-14 PROCEDURE — 83550 IRON BINDING TEST: CPT | Performed by: INTERNAL MEDICINE

## 2024-11-14 SDOH — HEALTH STABILITY: PHYSICAL HEALTH: ON AVERAGE, HOW MANY DAYS PER WEEK DO YOU ENGAGE IN MODERATE TO STRENUOUS EXERCISE (LIKE A BRISK WALK)?: 4 DAYS

## 2024-11-14 ASSESSMENT — PAIN SCALES - GENERAL: PAINLEVEL_OUTOF10: MILD PAIN (3)

## 2024-11-14 ASSESSMENT — SOCIAL DETERMINANTS OF HEALTH (SDOH): HOW OFTEN DO YOU GET TOGETHER WITH FRIENDS OR RELATIVES?: NEVER

## 2024-11-14 NOTE — PROGRESS NOTES
Preventive Care Visit  [facility] KEVIN Abad MD, Internal Medicine  Nov 14, 2024  {Provider  Link to [facility] :[id number]}    {PROVIDER CHARTING PREFERENCE:821192}    Janet Kauffman is a 44 year old, presenting for the following:  Physical         History of Present Illness       Reason for visit:  Annual physical   She is taking medications regularly.    Left knee pain: chronic.   Health Care Directive  Patient does not have a Health Care Directive: Discussed advance care planning with patient; information given to patient to review.      11/14/2024   General Health   How would you rate your overall physical health? Good   Feel stress (tense, anxious, or unable to sleep) To some extent      (!) STRESS CONCERN      11/14/2024   Nutrition   Three or more servings of calcium each day? (!) I DON'T KNOW   Diet: Other   If other, please elaborate: low gluten,janene dairy,low protein   How many servings of fruit and vegetables per day? (!) 2-3   How many sweetened beverages each day? 0-1            11/14/2024   Exercise   Days per week of moderate/strenous exercise 4 days            11/14/2024   Social Factors   Frequency of gathering with friends or relatives Never   Worry food won't last until get money to buy more No   Food not last or not have enough money for food? No   Do you have housing? (Housing is defined as stable permanent housing and does not include staying ouside in a car, in a tent, in an abandoned building, in an overnight shelter, or couch-surfing.) Yes   Are you worried about losing your housing? No   Lack of transportation? No   Unable to get utilities (heat,electricity)? No      (!) SOCIAL CONNECTIONS CONCERN      11/14/2024   Dental   Dentist two times every year? Yes            11/14/2024   TB Screening   Were you born outside of the [place]? No            Today's PHQ-2 Score:       11/14/2024    12:33 PM   PHQ-2 ( 1999 [employer])   Q1: Little interest or pleasure in doing things 1     Q2: Feeling down, depressed or hopeless 1    PHQ-2 Score 2    Q1: Little interest or pleasure in doing things Several days   Q2: Feeling down, depressed or hopeless Several days   PHQ-2 Score 2       Patient-reported           11/14/2024   Substance Use   Alcohol more than 3/day or more than 7/wk No   Do you use any other substances recreationally? No        Social History     Tobacco Use    Smoking status: Never    Smokeless tobacco: Never   Substance Use Topics    Alcohol use: Never    Drug use: Never     {Provider  If there are gaps in the social history shown above, please follow the link to update and then refresh the note Link to Social and Substance History :[id number]}      12/19/2023   LAST FHS-7 RESULTS   1st degree relative breast or ovarian cancer No   Any relative bilateral breast cancer No   Any male have breast cancer No   Any ONE woman have BOTH breast AND ovarian cancer No   Any woman with breast cancer before 50yrs No   2 or more relatives with breast AND/OR ovarian cancer No   2 or more relatives with breast AND/OR bowel cancer No        Mammogram Screening - Mammogram every 1-2 years updated in Health Maintenance based on mutual decision making          11/14/2024   One time HIV Screening   Previous HIV test? Yes          11/14/2024   STI Screening   New sexual partner(s) since last STI/HIV test? No        History of abnormal Pap smear: No - age 30- 64 PAP with HPV every 5 years recommended        Latest Ref Rng & Units 11/14/2023     4:01 PM   PAP / HPV   PAP  Low-grade squamous intraepithelial lesion (LSIL) encompassing HPV/mild dysplasia/CIN1    HPV 16 DNA Negative Negative    HPV 18 DNA Negative Negative    Other HR HPV Negative Negative      ASCVD Risk   The 10-year ASCVD risk score (Marian HALL, et al., 2019) is: 0.4%    Values used to calculate the score:      Age: 44 years      Sex: Female      Is Non- : No      Diabetic: No      Tobacco smoker: No       "Systolic Blood Pressure: 136 mmHg      Is BP treated: Yes      HDL Cholesterol: 76 mg/dL      Total Cholesterol: 158 mg/dL        11/14/2024   Contraception/Family Planning   Questions about contraception or family planning No      Reviewed and updated as needed this visit by Provider                       Objective    Exam  /72 (BP Location: Right arm, Patient Position: Sitting, Cuff Size: Adult Regular)   Pulse 79   Temp 98.4  F (36.9  C) (Temporal)   Resp 14   Ht 1.78 m (5' 10.08\")   Wt 66.9 kg (147 lb 8 oz)   SpO2 99%   BMI 21.12 kg/m     Estimated body mass index is 21.12 kg/m  as calculated from the following:    Height as of this encounter: 1.78 m (5' 10.08\").    Weight as of this encounter: 66.9 kg (147 lb 8 oz).    Physical Exam  Vitals reviewed.               Signed Electronically by: Samira Abad MD  {Email feedback regarding this note to [email or web address]   :[id number]}  "

## 2024-11-15 ENCOUNTER — LAB (OUTPATIENT)
Dept: LAB | Facility: CLINIC | Age: 44
End: 2024-11-15
Payer: COMMERCIAL

## 2024-11-15 ENCOUNTER — OFFICE VISIT (OUTPATIENT)
Dept: TRANSPLANT | Facility: CLINIC | Age: 44
End: 2024-11-15
Attending: INTERNAL MEDICINE
Payer: COMMERCIAL

## 2024-11-15 VITALS
DIASTOLIC BLOOD PRESSURE: 85 MMHG | TEMPERATURE: 98.4 F | HEART RATE: 80 BPM | WEIGHT: 148.7 LBS | BODY MASS INDEX: 21.29 KG/M2 | OXYGEN SATURATION: 98 % | SYSTOLIC BLOOD PRESSURE: 134 MMHG

## 2024-11-15 DIAGNOSIS — D84.9 IMMUNOSUPPRESSED STATUS (H): ICD-10-CM

## 2024-11-15 DIAGNOSIS — N18.31 STAGE 3A CHRONIC KIDNEY DISEASE (H): ICD-10-CM

## 2024-11-15 DIAGNOSIS — D84.9 IMMUNOSUPPRESSION (H): ICD-10-CM

## 2024-11-15 DIAGNOSIS — Z48.22 ENCOUNTER FOR AFTERCARE FOLLOWING KIDNEY TRANSPLANT: ICD-10-CM

## 2024-11-15 DIAGNOSIS — Z48.298 AFTERCARE FOLLOWING ORGAN TRANSPLANT: ICD-10-CM

## 2024-11-15 DIAGNOSIS — Z94.0 KIDNEY TRANSPLANT RECIPIENT: ICD-10-CM

## 2024-11-15 DIAGNOSIS — N02.B9 IGA NEPHROPATHY: ICD-10-CM

## 2024-11-15 DIAGNOSIS — Z94.0 KIDNEY REPLACED BY TRANSPLANT: Primary | ICD-10-CM

## 2024-11-15 LAB
ALBUMIN MFR UR ELPH: 7.6 MG/DL
ALBUMIN UR-MCNC: NEGATIVE MG/DL
ANION GAP SERPL CALCULATED.3IONS-SCNC: 11 MMOL/L (ref 7–15)
ANION GAP SERPL CALCULATED.3IONS-SCNC: 8 MMOL/L (ref 7–15)
APPEARANCE UR: CLEAR
BASOPHILS # BLD AUTO: 0.1 10E3/UL (ref 0–0.2)
BASOPHILS NFR BLD AUTO: 1 %
BILIRUB UR QL STRIP: NEGATIVE
BUN SERPL-MCNC: 23.9 MG/DL (ref 6–20)
BUN SERPL-MCNC: 23.9 MG/DL (ref 6–20)
CALCIUM SERPL-MCNC: 9.2 MG/DL (ref 8.8–10.4)
CALCIUM SERPL-MCNC: 9.6 MG/DL (ref 8.8–10.4)
CHLORIDE SERPL-SCNC: 106 MMOL/L (ref 98–107)
CHLORIDE SERPL-SCNC: 106 MMOL/L (ref 98–107)
COLOR UR AUTO: ABNORMAL
CREAT SERPL-MCNC: 1.37 MG/DL (ref 0.51–0.95)
CREAT SERPL-MCNC: 1.42 MG/DL (ref 0.51–0.95)
CREAT UR-MCNC: 73.9 MG/DL
EGFRCR SERPLBLD CKD-EPI 2021: 47 ML/MIN/1.73M2
EGFRCR SERPLBLD CKD-EPI 2021: 49 ML/MIN/1.73M2
EOSINOPHIL # BLD AUTO: 0.2 10E3/UL (ref 0–0.7)
EOSINOPHIL NFR BLD AUTO: 3 %
ERYTHROCYTE [DISTWIDTH] IN BLOOD BY AUTOMATED COUNT: 13.2 % (ref 10–15)
GLUCOSE SERPL-MCNC: 90 MG/DL (ref 70–99)
GLUCOSE SERPL-MCNC: 91 MG/DL (ref 70–99)
GLUCOSE UR STRIP-MCNC: NEGATIVE MG/DL
HCO3 SERPL-SCNC: 22 MMOL/L (ref 22–29)
HCO3 SERPL-SCNC: 24 MMOL/L (ref 22–29)
HCT VFR BLD AUTO: 35.9 % (ref 35–47)
HGB BLD-MCNC: 11.3 G/DL (ref 11.7–15.7)
HGB UR QL STRIP: NEGATIVE
IMM GRANULOCYTES # BLD: 0 10E3/UL
IMM GRANULOCYTES NFR BLD: 0 %
KETONES UR STRIP-MCNC: NEGATIVE MG/DL
LEUKOCYTE ESTERASE UR QL STRIP: NEGATIVE
LYMPHOCYTES # BLD AUTO: 1.4 10E3/UL (ref 0.8–5.3)
LYMPHOCYTES NFR BLD AUTO: 21 %
MCH RBC QN AUTO: 28.5 PG (ref 26.5–33)
MCHC RBC AUTO-ENTMCNC: 31.5 G/DL (ref 31.5–36.5)
MCV RBC AUTO: 90 FL (ref 78–100)
MONOCYTES # BLD AUTO: 0.8 10E3/UL (ref 0–1.3)
MONOCYTES NFR BLD AUTO: 12 %
MUCOUS THREADS #/AREA URNS LPF: PRESENT /LPF
NEUTROPHILS # BLD AUTO: 4.2 10E3/UL (ref 1.6–8.3)
NEUTROPHILS NFR BLD AUTO: 63 %
NITRATE UR QL: NEGATIVE
NRBC # BLD AUTO: 0 10E3/UL
NRBC BLD AUTO-RTO: 0 /100
PH UR STRIP: 6 [PH] (ref 5–7)
PLATELET # BLD AUTO: 257 10E3/UL (ref 150–450)
POTASSIUM SERPL-SCNC: 4.7 MMOL/L (ref 3.4–5.3)
POTASSIUM SERPL-SCNC: 5.1 MMOL/L (ref 3.4–5.3)
PROT/CREAT 24H UR: 0.1 MG/MG CR (ref 0–0.2)
RBC # BLD AUTO: 3.97 10E6/UL (ref 3.8–5.2)
RBC URINE: 1 /HPF
SODIUM SERPL-SCNC: 138 MMOL/L (ref 135–145)
SODIUM SERPL-SCNC: 139 MMOL/L (ref 135–145)
SP GR UR STRIP: 1.01 (ref 1–1.03)
SQUAMOUS EPITHELIAL: <1 /HPF
UROBILINOGEN UR STRIP-MCNC: NORMAL MG/DL
WBC # BLD AUTO: 6.7 10E3/UL (ref 4–11)
WBC URINE: <1 /HPF

## 2024-11-15 PROCEDURE — 85025 COMPLETE CBC W/AUTO DIFF WBC: CPT | Performed by: PATHOLOGY

## 2024-11-15 PROCEDURE — 84156 ASSAY OF PROTEIN URINE: CPT | Performed by: PATHOLOGY

## 2024-11-15 PROCEDURE — 87799 DETECT AGENT NOS DNA QUANT: CPT | Performed by: INTERNAL MEDICINE

## 2024-11-15 PROCEDURE — 99213 OFFICE O/P EST LOW 20 MIN: CPT | Performed by: INTERNAL MEDICINE

## 2024-11-15 PROCEDURE — 81001 URINALYSIS AUTO W/SCOPE: CPT | Performed by: PATHOLOGY

## 2024-11-15 PROCEDURE — 99000 SPECIMEN HANDLING OFFICE-LAB: CPT | Performed by: PATHOLOGY

## 2024-11-15 PROCEDURE — 36415 COLL VENOUS BLD VENIPUNCTURE: CPT | Performed by: PATHOLOGY

## 2024-11-15 PROCEDURE — 80048 BASIC METABOLIC PNL TOTAL CA: CPT | Performed by: PATHOLOGY

## 2024-11-15 RX ORDER — ACETAMINOPHEN 160 MG
1 TABLET,DISINTEGRATING ORAL DAILY
COMMUNITY
Start: 2024-06-17

## 2024-11-15 RX ORDER — FERROUS SULFATE 325(65) MG
325 TABLET, DELAYED RELEASE (ENTERIC COATED) ORAL DAILY
COMMUNITY

## 2024-11-15 ASSESSMENT — PAIN SCALES - GENERAL: PAINLEVEL_OUTOF10: NO PAIN (0)

## 2024-11-15 NOTE — LETTER
11/15/2024      Daly Seaman  [address] [place] [address]      Dear Colleague,    Thank you for referring your patient, Daly Seaman, to the [facility]. Please see a copy of my visit note below.    TRANSPLANT NEPHROLOGY CHRONIC POST TRANSPLANT VISIT    Assessment & Plan  # LDKT: Stable   - CKD stage 3aA1; Baseline Creatinine:  ~ 1.4-1.6   - Proteinuria: Normal (<0.2 grams)   - Date DSA Last Checked:      Latest DSA: Not checked recently, but was negative with last check   - BK Viremia: No   - Kidney Tx Biopsy: No    # Immunosuppression: Tacrolimus immediate release (goal 4-6) and Mycophenolic acid (dose 360, 180, 360)   - Continue with intensive monitoring of immunosuppression for efficacy and toxicity.   - Changes: Not at this time   -. IS intolerance: steroids-severe adverse effects: weight gain, insomnia, tapered off steroids early post transplant and MPA dose adjusted/increased   - note: 1 haplotype match, LDKTx from mother    # IgA nephropathy:   - monitor for hematuria and proteinuria. Clawson UA on most recent check today    # Infection Prophylaxis:   - PJP: None ; absolute CD4>200 2/2024    # Hypertension: Controlled;  Goal BP: < 130/80   - Changes: Not at this time     # Anemia of CKD: Hb ~stable   - ferrous sulfate 325 mg po with lunch    # Mineral Bone Disorder:    - Secondary renal hyperparathyroidism; PTH level: Not checked recently        On treatment: None  - Vitamin D; level: Not checked recently        On supplement: Yes  - Calcium; level: Normal        On supplement: No  - Phosphorus; level: Normal        On supplement: No    # Electrolytes:   - Potassium; level: High normal        On supplement: No  - Magnesium; level: Normal        On supplement: No  - Bicarbonate; level: Normal        On supplement: No  - Sodium; level: Normal    # Uterine Fibroid:   - follow up with GYN     # Thyroid nodule:   - followed regularly by PCP/endocrine,    +strong family hx of  thyroid disease    # Skin Cancer Risk:    - Recommend regular follow up with Dermatology.    # Health Maintenance and Vaccination Review: Reviewed and up to date    # Transplant History:  Etiology of Kidney Failure: IgA nephropathy  Tx: LDKT preemptive from mother (one haplotype match)  Transplant: 4/8/2021 (Kidney)  Significant changes in immunosuppression:  switched to steroid free regimen due to side effects  Significant transplant-related complications: None    Transplant Office Phone Number: [phone]    Assessment and plan was discussed with the patient and she voiced her understanding and agreement.    Return visit: Return in about 1 year (around 11/15/2025).    Rachna Dasilva MD    The longitudinal plan of care for the diagnosis(es)/condition(s) as documented were addressed during this visit. Due to the added complexity in care, I will continue to support Daly in the subsequent management and with ongoing continuity of care.    I spent a total of 42 minutes on the date of the encounter doing chart review, performing a history and physical exam, completing documentation and any further activities as noted above.      Chief Complaint  Ms. eSaman is a 44 year old here for kidney transplant and immunosuppression management.    History of Present Illness  Mrs. 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 Baseline Cr-1/4-1.6. she had severe intolerance to steroids and was weaned off prednisone durnig the early few months post transplant nad MPA dose adjusted. She had some GI issues on MMF and was switched to MPA tid dosing and she follows dairy/gluten free diet.. No hx of rejection, DSA, BK, infection, or malignancy. No IgA recurrence.     Interval Hx:  Mrs. Seaman feels ok overall, main concerns addressed today:  - Hospitalized in early Feb for  "acute gastroenteritis JOSAFAT panel rota virus. Symptoms have resolved since   - increased fatigue over the past few months  - irregular menses, no heavy cycles, no abdominal pain.   - increased urinary frequency, urgency, no dysuria, no graft pain    Denies any fevers, chills, night sweats, weight loss. No nausea, vomiting, diarrhea  MRI pelvis 3/2024 showed a large 8.1 cm uterine fibroid, bilateral adnexal cysts. Scheduled to f/up GYN in early December and will discuss next steps     IS FK 2/2.5 /180/360  IS side effects: Steroid intolerance weaned off prednisone and changed tacrolimus (significant weight gain, insomnia)  Ppx none; vitamin D  Ca screen dermatology, pap smear \"colposcopy\"; mammogram    Home BP~not checked        Problem List  IgA nephropathy  LDKTX 2021  Uterine fibroid    Social Hx: works full time-healthcare , , one daughter 12 yo     Allergies  Allergies   Allergen Reactions     Wasp Venom Protein Unknown       Medications  Current Outpatient Medications   Medication Sig Dispense Refill     albuterol (PROAIR HFA/PROVENTIL HFA/VENTOLIN HFA) 108 (90 Base) MCG/ACT inhaler Inhale 2 puffs into the lungs as needed.       aspirin 81 MG EC tablet Take 81 mg by mouth daily       biotin 5 MG CAPS Take 1 capsule by mouth       Cholecalciferol (VITAMIN D3) 50 MCG (2000 UT) CAPS Take 1 capsule by mouth daily.       levonorgestrel (MIRENA) 52 MG (20 mcg/day) IUD 1 each by Intrauterine route       lisinopril (ZESTRIL) 2.5 MG tablet Take 1 tablet (2.5 mg) by mouth daily       Multiple Vitamins-Minerals (ONCOVITE) TABS Take 1 tablet by mouth daily       mycophenolic acid (GENERIC EQUIVALENT) 180 MG EC tablet Take 360mg (2 tablets) in the morning, take 180mg (1 tablet) midday, and take 360mg (2 tablets) in the evening. 150 tablet 11     pantoprazole (PROTONIX) 40 MG EC tablet Take 1 tablet (40 mg) by mouth daily. 90 tablet 3     tacrolimus (GENERIC EQUIVALENT) 1 MG capsule Take 2 capsules (2 mg) by " mouth 2 times daily. 120 capsule 11     No current facility-administered medications for this visit.     There are no discontinued medications.    Physical Exam  Vital Signs: /85 (BP Location: Left arm, Patient Position: Sitting, Cuff Size: Adult Regular)   Pulse 80   Temp 98.4  F (36.9  C) (Oral)   Wt 67.4 kg (148 lb 11.2 oz)   SpO2 98%   BMI 21.29 kg/m      GENERAL APPEARANCE: alert and no distress  HENT: mouth without ulcers or lesions  RESP: lungs clear to auscultation - no rales, rhonchi or wheezes  CV: regular rhythm, normal rate, no rub, no murmur  EDEMA: no LE edema bilaterally  ABDOMEN: soft, nondistended, nontender, bowel sounds normal  MS: extremities normal - no gross deformities noted, no evidence of inflammation in joints, no muscle tenderness  SKIN: no rash      Data        Latest Ref Rng & Units 8/23/2024     7:56 AM 6/20/2024     8:45 AM 4/19/2024     8:00 AM   Renal   Sodium 135 - 145 mmol/L 141  140  140    K 3.4 - 5.3 mmol/L 4.4  4.8  4.1    Cl 98 - 107 mmol/L 108  109  106    Cl (external) 98 - 107 mmol/L 108  109  106    CO2 22 - 29 mmol/L 20  22  21    Urea Nitrogen 6.0 - 20.0 mg/dL 25.8  21.1  17.9    Creatinine 0.51 - 0.95 mg/dL 1.50  1.53  1.60    Glucose 70 - 99 mg/dL 77  78  119    Calcium 8.8 - 10.4 mg/dL 9.6  9.5  9.6          Latest Ref Rng & Units 11/14/2024     2:09 PM 10/7/2023     9:09 AM 9/9/2023     7:57 AM   Bone Health   Phosphorus 2.5 - 4.5 mg/dL  3.1  3.2    Vit D Def 20 - 50 ng/mL 45            Latest Ref Rng & Units 8/23/2024     7:56 AM 6/20/2024     8:45 AM 4/19/2024     8:00 AM   Heme   WBC 4.0 - 11.0 10e3/uL 5.7  5.7  16.3    Hgb 11.7 - 15.7 g/dL 11.7  11.7  12.0    Plt 150 - 450 10e3/uL 216  244  220          Latest Ref Rng & Units 2/9/2024     1:06 PM   Liver   AP 40 - 150 U/L 48    TBili <=1.2 mg/dL 0.3    ALT 0 - 50 U/L 12    AST 0 - 45 U/L 23    Tot Protein 6.4 - 8.3 g/dL 7.0    Albumin 3.5 - 5.2 g/dL 4.5          Latest Ref Rng & Units 2/9/2024    10:55  AM   Pancreas   Lipase (Roche) 13 - 60 U/L 62          Latest Ref Rng & Units 11/14/2024     2:09 PM   Iron studies   Iron 37 - 145 ug/dL 62    Iron Sat Index 15 - 46 % 27    Ferritin 6 - 175 ng/mL 85          Latest Ref Rng & Units 4/20/2024     1:03 PM   [facility] Txp Virology   EBV CAPSID ANTIBODY IGG No detectable antibody. Positive        Recent Labs   Lab Test 04/19/24  0800 06/20/24  0845 08/23/24  0756   DOSTAC 4/18/2024 6/20/2024 8/22/2024   TACROL 4.9* 5.1 4.6*     Recent Labs   Lab Test 02/09/24  1758   DOSMPA 2/9/2024   5:28 PM   MPACID 1.09   MPAG 28.6*         Again, thank you for allowing me to participate in the care of your patient.        Sincerely,        Rachna Dasilva MD

## 2024-11-15 NOTE — PROGRESS NOTES
TRANSPLANT NEPHROLOGY CHRONIC POST TRANSPLANT VISIT    Assessment & Plan   # LDKT: Stable   - CKD stage 3aA1; Baseline Creatinine:  ~ 1.4-1.6   - Proteinuria: Normal (<0.2 grams)   - Date DSA Last Checked:      Latest DSA: Not checked recently, but was negative with last check   - BK Viremia: No   - Kidney Tx Biopsy: No    # Immunosuppression: Tacrolimus immediate release (goal 4-6) and Mycophenolic acid (dose 360, 180, 360)   - Continue with intensive monitoring of immunosuppression for efficacy and toxicity.   - Changes: Not at this time   -. IS intolerance: steroids-severe adverse effects: weight gain, insomnia, tapered off steroids early post transplant and MPA dose adjusted/increased   - note: 1 haplotype match, LDKTx from mother    # IgA nephropathy:   - monitor for hematuria and proteinuria. [place] UA on most recent check today    # Infection Prophylaxis:   - PJP: None ; absolute CD4>200 2/2024    # Hypertension: Controlled;  Goal BP: < 130/80   - Changes: Not at this time     # Anemia of CKD: Hb ~stable   - ferrous sulfate 325 mg po with lunch    # Mineral Bone Disorder:    - Secondary renal hyperparathyroidism; PTH level: Not checked recently        On treatment: None  - Vitamin D; level: Not checked recently        On supplement: Yes  - Calcium; level: Normal        On supplement: No  - Phosphorus; level: Normal        On supplement: No    # Electrolytes:   - Potassium; level: High normal        On supplement: No  - Magnesium; level: Normal        On supplement: No  - Bicarbonate; level: Normal        On supplement: No  - Sodium; level: Normal    # Uterine Fibroid:   - follow up with GYN     # Thyroid nodule:   - followed regularly by PCP/endocrine,    +strong family hx of thyroid disease    # Skin Cancer Risk:    - Recommend regular follow up with Dermatology.    # Health Maintenance and Vaccination Review: Reviewed and up to date    # Transplant History:  Etiology of Kidney Failure: IgA nephropathy  Tx:  LDKT preemptive from mother (one haplotype match)  Transplant: 4/8/2021 (Kidney)  Significant changes in immunosuppression:  switched to steroid free regimen due to side effects  Significant transplant-related complications: None    Transplant Office Phone Number: [phone]    Assessment and plan was discussed with the patient and she voiced her understanding and agreement.    Return visit: Return in about 1 year (around 11/15/2025).    Rachna Dasilva MD    The longitudinal plan of care for the diagnosis(es)/condition(s) as documented were addressed during this visit. Due to the added complexity in care, I will continue to support Daly in the subsequent management and with ongoing continuity of care.    I spent a total of 42 minutes on the date of the encounter doing chart review, performing a history and physical exam, completing documentation and any further activities as noted above.      Chief Complaint   Ms. Seaman is a 44 year old here for kidney transplant and immunosuppression management.    History of Present Illness   Mrs. 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 Baseline Cr-1/4-1.6. she had severe intolerance to steroids and was weaned off prednisone durnig the early few months post transplant nad MPA dose adjusted. She had some GI issues on MMF and was switched to MPA tid dosing and she follows dairy/gluten free diet.. No hx of rejection, DSA, BK, infection, or malignancy. No IgA recurrence.     Interval Hx:  Mrs. Seaman feels ok overall, main concerns addressed today:  - Hospitalized in early Feb for acute gastroenteritis JOSAFAT panel rota virus. Symptoms have resolved since   - increased fatigue over the past few months  - irregular menses, no heavy cycles, no abdominal pain.   - increased urinary frequency, urgency, no dysuria, no  "graft pain    Denies any fevers, chills, night sweats, weight loss. No nausea, vomiting, diarrhea  MRI pelvis 3/2024 showed a large 8.1 cm uterine fibroid, bilateral adnexal cysts. Scheduled to f/up GYN in early December and will discuss next steps     IS FK 2/2.5 /180/360  IS side effects: Steroid intolerance weaned off prednisone and changed tacrolimus (significant weight gain, insomnia)  Ppx none; vitamin D  Ca screen dermatology, pap smear \"colposcopy\"; mammogram    Home BP~not checked        Problem List   IgA nephropathy  LDKTX 2021  Uterine fibroid    Social Hx: works full time-healthcare , , one daughter 14 yo     Allergies   Allergies   Allergen Reactions    Wasp Venom Protein Unknown       Medications   Current Outpatient Medications   Medication Sig Dispense Refill    albuterol (PROAIR HFA/PROVENTIL HFA/VENTOLIN HFA) 108 (90 Base) MCG/ACT inhaler Inhale 2 puffs into the lungs as needed.      aspirin 81 MG EC tablet Take 81 mg by mouth daily      biotin 5 MG CAPS Take 1 capsule by mouth      Cholecalciferol (VITAMIN D3) 50 MCG (2000 UT) CAPS Take 1 capsule by mouth daily.      levonorgestrel (MIRENA) 52 MG (20 mcg/day) IUD 1 each by Intrauterine route      lisinopril (ZESTRIL) 2.5 MG tablet Take 1 tablet (2.5 mg) by mouth daily      Multiple Vitamins-Minerals (ONCOVITE) TABS Take 1 tablet by mouth daily      mycophenolic acid (GENERIC EQUIVALENT) 180 MG EC tablet Take 360mg (2 tablets) in the morning, take 180mg (1 tablet) midday, and take 360mg (2 tablets) in the evening. 150 tablet 11    pantoprazole (PROTONIX) 40 MG EC tablet Take 1 tablet (40 mg) by mouth daily. 90 tablet 3    tacrolimus (GENERIC EQUIVALENT) 1 MG capsule Take 2 capsules (2 mg) by mouth 2 times daily. 120 capsule 11     No current facility-administered medications for this visit.     There are no discontinued medications.    Physical Exam   Vital Signs: /85 (BP Location: Left arm, Patient Position: Sitting, Cuff " Size: Adult Regular)   Pulse 80   Temp 98.4  F (36.9  C) (Oral)   Wt 67.4 kg (148 lb 11.2 oz)   SpO2 98%   BMI 21.29 kg/m      GENERAL APPEARANCE: alert and no distress  HENT: mouth without ulcers or lesions  RESP: lungs clear to auscultation - no rales, rhonchi or wheezes  CV: regular rhythm, normal rate, no rub, no murmur  EDEMA: no LE edema bilaterally  ABDOMEN: soft, nondistended, nontender, bowel sounds normal  MS: extremities normal - no gross deformities noted, no evidence of inflammation in joints, no muscle tenderness  SKIN: no rash      Data         Latest Ref Rng & Units 8/23/2024     7:56 AM 6/20/2024     8:45 AM 4/19/2024     8:00 AM   Renal   Sodium 135 - 145 mmol/L 141  140  140    K 3.4 - 5.3 mmol/L 4.4  4.8  4.1    Cl 98 - 107 mmol/L 108  109  106    Cl (external) 98 - 107 mmol/L 108  109  106    CO2 22 - 29 mmol/L 20  22  21    Urea Nitrogen 6.0 - 20.0 mg/dL 25.8  21.1  17.9    Creatinine 0.51 - 0.95 mg/dL 1.50  1.53  1.60    Glucose 70 - 99 mg/dL 77  78  119    Calcium 8.8 - 10.4 mg/dL 9.6  9.5  9.6          Latest Ref Rng & Units 11/14/2024     2:09 PM 10/7/2023     9:09 AM 9/9/2023     7:57 AM   Bone Health   Phosphorus 2.5 - 4.5 mg/dL  3.1  3.2    Vit D Def 20 - 50 ng/mL 45            Latest Ref Rng & Units 8/23/2024     7:56 AM 6/20/2024     8:45 AM 4/19/2024     8:00 AM   Heme   WBC 4.0 - 11.0 10e3/uL 5.7  5.7  16.3    Hgb 11.7 - 15.7 g/dL 11.7  11.7  12.0    Plt 150 - 450 10e3/uL 216  244  220          Latest Ref Rng & Units 2/9/2024     1:06 PM   Liver   AP 40 - 150 U/L 48    TBili <=1.2 mg/dL 0.3    ALT 0 - 50 U/L 12    AST 0 - 45 U/L 23    Tot Protein 6.4 - 8.3 g/dL 7.0    Albumin 3.5 - 5.2 g/dL 4.5          Latest Ref Rng & Units 2/9/2024    10:55 AM   Pancreas   Lipase (Roche) 13 - 60 U/L 62          Latest Ref Rng & Units 11/14/2024     2:09 PM   Iron studies   Iron 37 - 145 ug/dL 62    Iron Sat Index 15 - 46 % 27    Ferritin 6 - 175 ng/mL 85          Latest Ref Rng & Units  4/20/2024     1:03 PM   [facility] Virology   EBV CAPSID ANTIBODY IGG No detectable antibody. Positive        Recent Labs   Lab Test 04/19/24  0800 06/20/24  0845 08/23/24  0756   DOSTAC 4/18/2024 6/20/2024 8/22/2024   TACROL 4.9* 5.1 4.6*     Recent Labs   Lab Test 02/09/24  1758   DOSMPA 2/9/2024   5:28 PM   MPACID 1.09   MPAG 28.6*

## 2024-11-15 NOTE — NURSING NOTE
Chief Complaint   Patient presents with    RECHECK     K/P POST RETURN TXP NEPH - SB pt via phone         Vitals:    11/15/24 1122 11/15/24 1126   BP: (!) 148/91 134/85   BP Location: Left arm Left arm   Patient Position: Sitting Sitting   Cuff Size: Adult Regular Adult Regular   Pulse: 80    Temp: 98.4  F (36.9  C)    TempSrc: Oral    SpO2: 98%    Weight: 67.4 kg (148 lb 11.2 oz)        BP Readings from Last 3 Encounters:   11/15/24 134/85   11/14/24 136/72   02/20/24 102/60       /85 (BP Location: Left arm, Patient Position: Sitting, Cuff Size: Adult Regular)   Pulse 80   Temp 98.4  F (36.9  C) (Oral)   Wt 67.4 kg (148 lb 11.2 oz)   SpO2 98%   BMI 21.29 kg/m       Ramiro Nunez, [facility]

## 2024-11-17 LAB — EBV DNA SERPL NAA+PROBE-ACNC: NOT DETECTED IU/ML

## 2024-11-20 ENCOUNTER — TELEPHONE (OUTPATIENT)
Dept: FAMILY MEDICINE | Facility: CLINIC | Age: 44
End: 2024-11-20
Payer: COMMERCIAL

## 2024-11-20 DIAGNOSIS — M25.562 CHRONIC PAIN OF LEFT KNEE: Primary | ICD-10-CM

## 2024-11-20 DIAGNOSIS — G89.29 CHRONIC PAIN OF LEFT KNEE: Primary | ICD-10-CM

## 2024-11-27 NOTE — PROGRESS NOTES
[facility]  OB/GYN Clinic Note    SUBJECTIVE:                                                   Daly Seaman is a 44 year old female who presents to clinic today for the following health issue(s):  Patient presents with:  Follow Up: US follow up       Additional information: US follow up    HPI:  Seen today for follow-up of fibroid uterus. Has had more noticeable symptoms and requested visit to evaluate fibroids. Having more bladder symptoms.   Harder time emptying bladder. Has urgency. Worse over the last 4 months. No lifestyle changes to explain symptoms. Drinks one cup of coffee daily.   Leading up to menses, bladder symptoms get worse.     Patient's last menstrual period was 11/05/2024 (approximate)..   Patient is sexually active, No obstetric history on file..  Using IUD for contraception.    reports that she has never smoked. She has never used smokeless tobacco.  STD testing offered?  Accepted  Health maintenance updated:  yes    Today's PHQ-2 Score:       11/14/2024    12:33 PM   PHQ-2 ( 1999 [employer])   Q1: Little interest or pleasure in doing things 1    Q2: Feeling down, depressed or hopeless 1    PHQ-2 Score 2    Q1: Little interest or pleasure in doing things Several days   Q2: Feeling down, depressed or hopeless Several days   PHQ-2 Score 2       Patient-reported     Today's PHQ-9 Score:        No data to display              Today's CYNTHIA-7 Score:        No data to display                Problem list and histories reviewed & adjusted, as indicated.  Additional history: as documented.    Patient Active Problem List   Diagnosis    Annual physical exam    IgA nephropathy    Kidney replaced by transplant    Thyroid nodule    Elevated BP without diagnosis of hypertension    Cervical cancer screening    Dysplasia of cervix, low grade (LAUREEN 1)    Nausea and vomiting, unspecified vomiting type    Immunosuppression (H)    Hyperparathyroidism (H)    Heterozygous factor V Leiden mutation  (H)    Stage 3a chronic kidney disease (H)    Other fatigue    Vitamin D deficiency    Chronic pain of left knee    Kidney transplant recipient     Past Surgical History:   Procedure Laterality Date    ABDOMEN SURGERY  April 2021    IR THYROID BIOPSY  2/4/2020    IR THYROID BIOPSY  9/15/2015    TRANSPLANT  April 2012    kidney      Social History     Tobacco Use    Smoking status: Never    Smokeless tobacco: Never   Substance Use Topics    Alcohol use: Yes     Comment: occasional use.      Problem (# of Occurrences) Relation (Name,Age of Onset)    Mental Illness (1) Father (Randell)    Genetic Disorder (2) Mother (Digna): Osteogenesis imperfecta, Brother (Joshua): Osteogenesis imperfecta    Thyroid Disease (1) Mother (Digna): hypo thyroid              Current Outpatient Medications   Medication Sig Dispense Refill    albuterol (PROAIR HFA/PROVENTIL HFA/VENTOLIN HFA) 108 (90 Base) MCG/ACT inhaler Inhale 2 puffs into the lungs as needed.      aspirin 81 MG EC tablet Take 81 mg by mouth daily      biotin 5 MG CAPS Take 1 capsule by mouth      Cholecalciferol (VITAMIN D3) 50 MCG (2000 UT) CAPS Take 1 capsule by mouth daily.      ferrous sulfate (FE TABS) 325 (65 Fe) MG EC tablet Take 325 mg by mouth daily.      levonorgestrel (MIRENA) 52 MG (20 mcg/day) IUD 1 each by Intrauterine route      lisinopril (ZESTRIL) 2.5 MG tablet Take 1 tablet (2.5 mg) by mouth daily      Multiple Vitamins-Minerals (ONCOVITE) TABS Take 1 tablet by mouth daily      mycophenolic acid (GENERIC EQUIVALENT) 180 MG EC tablet Take 360mg (2 tablets) in the morning, take 180mg (1 tablet) midday, and take 360mg (2 tablets) in the evening. 150 tablet 11    pantoprazole (PROTONIX) 40 MG EC tablet Take 1 tablet (40 mg) by mouth daily. 90 tablet 3    tacrolimus (GENERIC EQUIVALENT) 1 MG capsule Take 2 capsules (2 mg) by mouth 2 times daily. 120 capsule 11     No current facility-administered medications for this visit.     Allergies   Allergen Reactions    Wasp  "Venom Protein Unknown           OBJECTIVE:     /84   Ht 1.778 m (5' 10\")   Wt 67.1 kg (147 lb 14.4 oz)   LMP 11/05/2024 (Approximate)   BMI 21.22 kg/m    Body mass index is 21.22 kg/m .    Exam:  Constitutional:  Appearance: Well nourished, well developed alert, in no acute distress     In-Clinic Test Results:  Results for orders placed or performed in visit on 12/03/24 (from the past 24 hours)   US Transvaginal Pelvic Non-OB    Narrative    Table formatting from the original result was not included.     Gynecological Ultrasound Report  Pelvic U/S - Transvaginal  [facility]  Referring Provider: Paty Unger MD  Sonographer:  Kimberly Schuster RDMS  Indication: Fibroids  LMP: No LMP recorded. Patient is perimenopausal.  History:   Gynecological Ultrasonography:   Uterus: retroverted. Contour is irregular w/ myomata: 1 Fundal Subserosal   8.1 x 7.8 x 5.9 cm.  Size: 9.82 x 9.49 x 7.80 cm  Endometrium: Thickness Total 4.21 mm  Findings: IUD in place  Right Ovary: 3.38 x 1.81 x 1.68 cm. Wnl  Left Ovary: 4.49 x 3.05 x 2.77 cm. Simple cyst 4 x 2.1 x 2.4 cm  Cul de Sac Free Fluid: No free fluid  Technique: Transvaginal Imaging performed     Impression:   A simple cyst was noted in the left ovary, consistent with a physiological   cys measuring 4cm. The right ovary is normal in appearance.   The uterus was enlarged due to large fundal fibroid measuring 8.1cm.   Unchanged from previous exam. The endometrium appears normal.   An IUD is located within the uterine cavity.    Paty Unger MD, [facility]  12/03/24               ASSESSMENT/PLAN:                                                        ICD-10-CM    1. Uterine leiomyoma, unspecified location  D25.9 IR Referral      2. Urinary symptom or sign  R39.9         Daly Seaman is a 44 year old with large fibroid uterus, stable in size, with worsening bulk/urinary symtpoms. Interested in discussing management. Disucssed US findings of stable " fibroids.   Discussed management with pelvic floor PT, disucssed medical management of AUB with fibroids. Diuscssed Lupron in the context of surigcal planning but don't advise to do this outside of planning for hysterectomy or myomectomy.   She does not plan future childbearing. Discussed for definitive management why hysterectomy is advised over myomectomy. Reviewed that her abnormal pap is not an indication for needing to proceed with hysterectomy, but would eliminate risk of cervical cancer in the future.   She is ready to proceed with procedural intervention to reduce bulk symptoms. Reviewed IR UFE and hysterectomy in great detail.   She is hesitant to proceed with hysterectomy due to long recovery after likely open procedure. Advised that this would need to be done at the [facility] with her transplant team on board.   She is open to meeting with IR for consultation for UFE. Referral placed.     36 minutes were spent on the date of the encounter doing chart review, review of outside records, review and interpretation of pertinent test results, history and exam, documentation, patient counseling, and further activities as noted above.      Paty Unger MD, MHS  [facility] [place]  12/03/24

## 2024-11-29 PROBLEM — Z94.0 KIDNEY TRANSPLANT RECIPIENT: Status: ACTIVE | Noted: 2024-11-29

## 2024-11-29 PROBLEM — M25.562 CHRONIC PAIN OF LEFT KNEE: Status: ACTIVE | Noted: 2024-11-29

## 2024-11-29 PROBLEM — G89.29 CHRONIC PAIN OF LEFT KNEE: Status: ACTIVE | Noted: 2024-11-29

## 2024-12-02 ENCOUNTER — LAB (OUTPATIENT)
Dept: LAB | Facility: CLINIC | Age: 44
End: 2024-12-02
Payer: COMMERCIAL

## 2024-12-02 ENCOUNTER — MYC MEDICAL ADVICE (OUTPATIENT)
Dept: TRANSPLANT | Facility: CLINIC | Age: 44
End: 2024-12-02

## 2024-12-02 DIAGNOSIS — Z98.890 OTHER SPECIFIED POSTPROCEDURAL STATES: ICD-10-CM

## 2024-12-02 DIAGNOSIS — Z79.899 ENCOUNTER FOR LONG-TERM CURRENT USE OF MEDICATION: ICD-10-CM

## 2024-12-02 DIAGNOSIS — Z48.298 AFTERCARE FOLLOWING ORGAN TRANSPLANT: Primary | ICD-10-CM

## 2024-12-02 DIAGNOSIS — Z20.828 CONTACT WITH AND (SUSPECTED) EXPOSURE TO OTHER VIRAL COMMUNICABLE DISEASES: ICD-10-CM

## 2024-12-02 DIAGNOSIS — Z48.298 AFTERCARE FOLLOWING ORGAN TRANSPLANT: ICD-10-CM

## 2024-12-02 DIAGNOSIS — Z94.0 KIDNEY REPLACED BY TRANSPLANT: ICD-10-CM

## 2024-12-02 LAB
ANION GAP SERPL CALCULATED.3IONS-SCNC: 10 MMOL/L (ref 7–15)
BUN SERPL-MCNC: 22.8 MG/DL (ref 6–20)
CALCIUM SERPL-MCNC: 9.4 MG/DL (ref 8.8–10.4)
CHLORIDE SERPL-SCNC: 104 MMOL/L (ref 98–107)
CREAT SERPL-MCNC: 1.64 MG/DL (ref 0.51–0.95)
EGFRCR SERPLBLD CKD-EPI 2021: 39 ML/MIN/1.73M2
ERYTHROCYTE [DISTWIDTH] IN BLOOD BY AUTOMATED COUNT: 12.6 % (ref 10–15)
GLUCOSE SERPL-MCNC: 80 MG/DL (ref 70–99)
HCO3 SERPL-SCNC: 22 MMOL/L (ref 22–29)
HCT VFR BLD AUTO: 38.1 % (ref 35–47)
HGB BLD-MCNC: 11.9 G/DL (ref 11.7–15.7)
MCH RBC QN AUTO: 28.3 PG (ref 26.5–33)
MCHC RBC AUTO-ENTMCNC: 31.2 G/DL (ref 31.5–36.5)
MCV RBC AUTO: 91 FL (ref 78–100)
PLATELET # BLD AUTO: 231 10E3/UL (ref 150–450)
POTASSIUM SERPL-SCNC: 4.5 MMOL/L (ref 3.4–5.3)
RBC # BLD AUTO: 4.2 10E6/UL (ref 3.8–5.2)
SODIUM SERPL-SCNC: 136 MMOL/L (ref 135–145)
TACROLIMUS BLD-MCNC: 5.2 UG/L (ref 5–15)
TME LAST DOSE: NORMAL H
TME LAST DOSE: NORMAL H
WBC # BLD AUTO: 5.4 10E3/UL (ref 4–11)

## 2024-12-02 PROCEDURE — 36415 COLL VENOUS BLD VENIPUNCTURE: CPT

## 2024-12-02 PROCEDURE — 80197 ASSAY OF TACROLIMUS: CPT

## 2024-12-02 PROCEDURE — 80048 BASIC METABOLIC PNL TOTAL CA: CPT

## 2024-12-02 PROCEDURE — 85027 COMPLETE CBC AUTOMATED: CPT

## 2024-12-03 ENCOUNTER — OFFICE VISIT (OUTPATIENT)
Dept: OBGYN | Facility: CLINIC | Age: 44
End: 2024-12-03
Attending: STUDENT IN AN ORGANIZED HEALTH CARE EDUCATION/TRAINING PROGRAM
Payer: COMMERCIAL

## 2024-12-03 ENCOUNTER — ANCILLARY PROCEDURE (OUTPATIENT)
Dept: ULTRASOUND IMAGING | Facility: CLINIC | Age: 44
End: 2024-12-03
Attending: STUDENT IN AN ORGANIZED HEALTH CARE EDUCATION/TRAINING PROGRAM
Payer: COMMERCIAL

## 2024-12-03 VITALS
DIASTOLIC BLOOD PRESSURE: 84 MMHG | BODY MASS INDEX: 21.17 KG/M2 | WEIGHT: 147.9 LBS | SYSTOLIC BLOOD PRESSURE: 132 MMHG | HEIGHT: 70 IN

## 2024-12-03 DIAGNOSIS — D25.9 UTERINE LEIOMYOMA, UNSPECIFIED LOCATION: Primary | ICD-10-CM

## 2024-12-03 DIAGNOSIS — R10.9 ABDOMINAL DISCOMFORT: ICD-10-CM

## 2024-12-03 DIAGNOSIS — D25.9 UTERINE LEIOMYOMA, UNSPECIFIED LOCATION: ICD-10-CM

## 2024-12-03 DIAGNOSIS — R39.9 URINARY SYMPTOM OR SIGN: ICD-10-CM

## 2024-12-03 PROCEDURE — 99214 OFFICE O/P EST MOD 30 MIN: CPT | Performed by: STUDENT IN AN ORGANIZED HEALTH CARE EDUCATION/TRAINING PROGRAM

## 2024-12-03 PROCEDURE — 76830 TRANSVAGINAL US NON-OB: CPT | Performed by: STUDENT IN AN ORGANIZED HEALTH CARE EDUCATION/TRAINING PROGRAM

## 2024-12-04 ENCOUNTER — TELEPHONE (OUTPATIENT)
Dept: OTHER | Facility: CLINIC | Age: 44
End: 2024-12-04
Payer: COMMERCIAL

## 2024-12-04 NOTE — TELEPHONE ENCOUNTER
IR referral for UFE.  Contacted patient, scheduled for 1/13 with Dr. Guerra.  Cally Conner RN  IR nurse clinician  [phone]

## 2024-12-18 ENCOUNTER — TELEPHONE (OUTPATIENT)
Dept: TRANSPLANT | Facility: CLINIC | Age: 44
End: 2024-12-18
Payer: COMMERCIAL

## 2024-12-18 DIAGNOSIS — D84.9 IMMUNOSUPPRESSED STATUS (H): ICD-10-CM

## 2024-12-18 DIAGNOSIS — Z94.0 KIDNEY TRANSPLANTED: ICD-10-CM

## 2024-12-18 RX ORDER — TACROLIMUS 1 MG/1
2 CAPSULE ORAL 2 TIMES DAILY
Qty: 120 CAPSULE | Refills: 11 | Status: SHIPPED | OUTPATIENT
Start: 2024-12-18

## 2024-12-18 NOTE — TELEPHONE ENCOUNTER
Medication Refill  Route to N Pool    Pharmacy Name:     [facility] - [place], [place] - [address] (Ph: [phone])       Name of Medication: Tacrolimus (GENERIC EQUIVALENT) 1 MG capsule        Has only 2 days left of Tacrolimus and is not sure if [facility] will cover ?      Please call Daly # [phone]    Was told by [facility] they faxed the Office many times.  (But we never received a fax request from [facility]).

## 2024-12-18 NOTE — TELEPHONE ENCOUNTER
Spoke with Daly. [facility] will no longer fill her tacrolimus, needs to be sent to [facility]. She would prefer to fill at a retail if possible. Refills sent to preferred pharmacy, discussed with pharmacist and drug will be covered by insurance. LVM for patient on call back that script was sent and insurance will cover.

## 2024-12-19 ENCOUNTER — THERAPY VISIT (OUTPATIENT)
Dept: PHYSICAL THERAPY | Facility: CLINIC | Age: 44
End: 2024-12-19
Attending: INTERNAL MEDICINE
Payer: COMMERCIAL

## 2024-12-19 DIAGNOSIS — G89.29 CHRONIC PATELLOFEMORAL PAIN OF LEFT KNEE: Primary | ICD-10-CM

## 2024-12-19 DIAGNOSIS — M25.562 CHRONIC PAIN OF LEFT KNEE: ICD-10-CM

## 2024-12-19 DIAGNOSIS — G89.29 CHRONIC PAIN OF LEFT KNEE: ICD-10-CM

## 2024-12-19 DIAGNOSIS — M25.562 CHRONIC PATELLOFEMORAL PAIN OF LEFT KNEE: Primary | ICD-10-CM

## 2024-12-19 ASSESSMENT — ACTIVITIES OF DAILY LIVING (ADL)
STIFFNESS: I DO NOT HAVE THE SYMPTOM
WEAKNESS: I DO NOT HAVE THE SYMPTOM
RAW_SCORE: 63
SIT WITH YOUR KNEE BENT: ACTIVITY IS MINIMALLY DIFFICULT
KNEEL ON THE FRONT OF YOUR KNEE: ACTIVITY IS SOMEWHAT DIFFICULT
SQUAT: ACTIVITY IS MINIMALLY DIFFICULT
STAND: ACTIVITY IS NOT DIFFICULT
GO DOWN STAIRS: ACTIVITY IS NOT DIFFICULT
SWELLING: I DO NOT HAVE THE SYMPTOM
AS_A_RESULT_OF_YOUR_KNEE_INJURY,_HOW_WOULD_YOU_RATE_YOUR_CURRENT_LEVEL_OF_DAILY_ACTIVITY?: NEARLY NORMAL
PAIN: THE SYMPTOM AFFECTS MY ACTIVITY MODERATELY
WEAKNESS: I DO NOT HAVE THE SYMPTOM
LIMPING: I DO NOT HAVE THE SYMPTOM
PLEASE_INDICATE_YOR_PRIMARY_REASON_FOR_REFERRAL_TO_THERAPY:: KNEE
KNEE_ACTIVITY_OF_DAILY_LIVING_SCORE: 90
STAND: ACTIVITY IS NOT DIFFICULT
SWELLING: I DO NOT HAVE THE SYMPTOM
WALK: ACTIVITY IS NOT DIFFICULT
WALK: ACTIVITY IS NOT DIFFICULT
HOW_WOULD_YOU_RATE_THE_CURRENT_FUNCTION_OF_YOUR_KNEE_DURING_YOUR_USUAL_DAILY_ACTIVITIES_ON_A_SCALE_FROM_0_TO_100_WITH_100_BEING_YOUR_LEVEL_OF_KNEE_FUNCTION_PRIOR_TO_YOUR_INJURY_AND_0_BEING_THE_INABILITY_TO_PERFORM_ANY_OF_YOUR_USUAL_DAILY_ACTIVITIES?: 60
SQUAT: ACTIVITY IS MINIMALLY DIFFICULT
GO DOWN STAIRS: ACTIVITY IS NOT DIFFICULT
LIMPING: I DO NOT HAVE THE SYMPTOM
SIT WITH YOUR KNEE BENT: ACTIVITY IS MINIMALLY DIFFICULT
HOW_WOULD_YOU_RATE_THE_OVERALL_FUNCTION_OF_YOUR_KNEE_DURING_YOUR_USUAL_DAILY_ACTIVITIES?: NEARLY NORMAL
HOW_WOULD_YOU_RATE_THE_OVERALL_FUNCTION_OF_YOUR_KNEE_DURING_YOUR_USUAL_DAILY_ACTIVITIES?: NEARLY NORMAL
AS_A_RESULT_OF_YOUR_KNEE_INJURY,_HOW_WOULD_YOU_RATE_YOUR_CURRENT_LEVEL_OF_DAILY_ACTIVITY?: NEARLY NORMAL
GIVING WAY, BUCKLING OR SHIFTING OF KNEE: I DO NOT HAVE THE SYMPTOM
RISE FROM A CHAIR: ACTIVITY IS NOT DIFFICULT
GO UP STAIRS: ACTIVITY IS NOT DIFFICULT
RISE FROM A CHAIR: ACTIVITY IS NOT DIFFICULT
STIFFNESS: I DO NOT HAVE THE SYMPTOM
GO UP STAIRS: ACTIVITY IS NOT DIFFICULT
KNEE_ACTIVITY_OF_DAILY_LIVING_SUM: 63
PAIN: THE SYMPTOM AFFECTS MY ACTIVITY MODERATELY
GIVING WAY, BUCKLING OR SHIFTING OF KNEE: I DO NOT HAVE THE SYMPTOM
HOW_WOULD_YOU_RATE_THE_CURRENT_FUNCTION_OF_YOUR_KNEE_DURING_YOUR_USUAL_DAILY_ACTIVITIES_ON_A_SCALE_FROM_0_TO_100_WITH_100_BEING_YOUR_LEVEL_OF_KNEE_FUNCTION_PRIOR_TO_YOUR_INJURY_AND_0_BEING_THE_INABILITY_TO_PERFORM_ANY_OF_YOUR_USUAL_DAILY_ACTIVITIES?: 60
KNEEL ON THE FRONT OF YOUR KNEE: ACTIVITY IS SOMEWHAT DIFFICULT

## 2024-12-19 NOTE — PROGRESS NOTES
PHYSICAL THERAPY EVALUATION  Type of Visit: Evaluation     Fall Risk Screen:  Fall screen completed by: PT  Have you fallen 2 or more times in the past year?: No  Have you fallen and had an injury in the past year?: No  Is patient a fall risk?: No    Subjective         Presenting condition or subjective complaint: (Patient-Rptd) knee pain. Patient has had ongoing anterior L knee pain x 20 years without incident. The pain increases with physical activity especially with squats and lunges. Patient works out 3 x week with ballet and exercise classes. She is able to do these workouts with modifications but always has pain afterwards. No hx of injury.  Date of onset: 11/20/24 (referral date)    Relevant medical history: (Patient-Rptd) Kidney disease   Past Medical History:   Diagnosis Date    History of blood transfusion April 2021    History of colposcopy 12/2022 1/2024    Hypertension     variable, based on health history    Thyroid disease     On watch, hypothyroid       Dates & types of surgery: (Patient-Rptd) Apr 2021 transplant   Past Surgical History:   Procedure Laterality Date    ABDOMEN SURGERY  April 2021    IR THYROID BIOPSY  2/4/2020    IR THYROID BIOPSY  9/15/2015    TRANSPLANT  April 2012    kidney         Prior diagnostic imaging/testing results: (Patient-Rptd) X-ray     Prior therapy history for the same diagnosis, illness or injury: (Patient-Rptd) No      Prior Level of Function  Transfers: Independent  Ambulation: Independent  ADL: Independent    Living Environment  Social support: (Patient-Rptd) With family members   Type of home: (Patient-Rptd) House; 2-story   Stairs to enter the home: (Patient-Rptd) Yes       Ramp: (Patient-Rptd) No   Stairs inside the home: (Patient-Rptd) Yes (Patient-Rptd) 12 Is there a railing: (Patient-Rptd) Yes     Help at home: (Patient-Rptd) None  Equipment owned:       Employment: (Patient-Rptd) Yes (Patient-Rptd) fundraising  Hobbies/Interests: (Patient-Rptd) exercise,  reading    Patient goals for therapy: (Patient-Rptd) exercise without pain    Pain assessment: Location: ant L knee/Ratin/10 with activity     Objective   KNEE EVALUATION  PAIN: Pain Level at Rest: 1/10  Pain Level with Use: 5/10  Pain is Exacerbated By: squatting, lunging, stairs  INTEGUMENTARY (edema, incisions):  no edema noted  GAIT:  Weightbearing Status: WBAT  Assistive Device(s): None  Gait Deviations: WNL  BALANCE/PROPRIOCEPTION: WNL  WEIGHTBEARING ALIGNMENT: Foot/Ankle WB Alignment:Pes planus L, Pes planus R  ROM: AROM WNL  STRENGTH:  L quad 5/5; hams 5/5; hip flex 4-/5; abd 4-/5; ext 4-/5; ER 4-/5  FLEXIBILITY: WNL  SPECIAL TESTS:  ligamentous tests (-); meniscal testing (-)  FUNCTIONAL TESTS: Single Leg Squat: Anterior knee translation, Knee valgus, Hip internal rotation, and Improper use of glutes/hips  PALPATION: WNL  JOINT MOBILITY:  slight lateral patellar tracking L    Assessment & Plan   CLINICAL IMPRESSIONS  Medical Diagnosis: L knee pain    Treatment Diagnosis: L knee pain   Impression/Assessment: Patient is a 44 year old female with L knee complaints.  The following significant findings have been identified: Pain, Decreased strength, Impaired muscle performance, and Decreased activity tolerance. These impairments interfere with their ability to perform recreational activities and community mobility as compared to previous level of function.     Clinical Decision Making (Complexity):  Clinical Presentation: Stable/Uncomplicated  Clinical Presentation Rationale: based on medical and personal factors listed in PT evaluation  Clinical Decision Making (Complexity): Low complexity    PLAN OF CARE  Treatment Interventions:  Interventions: Neuromuscular Re-education, Therapeutic Activity, Therapeutic Exercise, Self-Care/Home Management    Long Term Goals     PT Goal 1  Goal Identifier: stairs  Goal Description: ascend/descend stairs without pain  Rationale: to maximize safety and independence within  the home;to maximize safety and independence within the community  Target Date: 02/20/25      Frequency of Treatment: 2 x month  Duration of Treatment: 2 months      Education Assessment:   Learner/Method: Patient;Listening;Demonstration    Risks and benefits of evaluation/treatment have been explained.   Patient/Family/caregiver agrees with Plan of Care.     Evaluation Time:     PT Eval, Low Complexity Minutes (18242): 17       Signing Clinician: Eulalia Moreira PT

## 2025-01-02 PROBLEM — N87.0 DYSPLASIA OF CERVIX, LOW GRADE (CIN 1): Status: ACTIVE | Noted: 2023-11-15

## 2025-01-09 ENCOUNTER — THERAPY VISIT (OUTPATIENT)
Dept: PHYSICAL THERAPY | Facility: CLINIC | Age: 45
End: 2025-01-09
Payer: COMMERCIAL

## 2025-01-09 DIAGNOSIS — M25.562 CHRONIC PATELLOFEMORAL PAIN OF LEFT KNEE: Primary | ICD-10-CM

## 2025-01-09 DIAGNOSIS — G89.29 CHRONIC PATELLOFEMORAL PAIN OF LEFT KNEE: Primary | ICD-10-CM

## 2025-01-13 ENCOUNTER — OFFICE VISIT (OUTPATIENT)
Dept: OTHER | Facility: CLINIC | Age: 45
End: 2025-01-13
Attending: STUDENT IN AN ORGANIZED HEALTH CARE EDUCATION/TRAINING PROGRAM
Payer: COMMERCIAL

## 2025-01-13 VITALS — DIASTOLIC BLOOD PRESSURE: 76 MMHG | HEART RATE: 79 BPM | SYSTOLIC BLOOD PRESSURE: 127 MMHG

## 2025-01-13 DIAGNOSIS — D25.9 UTERINE LEIOMYOMA, UNSPECIFIED LOCATION: ICD-10-CM

## 2025-01-13 PROCEDURE — 99213 OFFICE O/P EST LOW 20 MIN: CPT | Performed by: RADIOLOGY

## 2025-01-13 NOTE — PROGRESS NOTES
[facility]        Patient is here for a consult to discuss UFE.    Pt is currently taking Aspirin.    /76 (BP Location: Right arm, Patient Position: Chair, Cuff Size: Adult Regular)   Pulse 79   LMP 11/05/2024 (Approximate)     The provider has been notified that the patient has no concerns.     Questions patient would like addressed today are: N/A.    Refills are needed: N/A    Has homecare services and agency name:  Mary Patel MA

## 2025-01-14 NOTE — PROGRESS NOTES
VASCULAR OUTPATIENT CONSULT OR VISIT  PHYSICIAN:  Dr. Quentin Guerra      LOCATION: [facility]    Dlay Seaman   Medical Record #:  [id number]  YOB: 1980  Age:  44 year old     Date of Service: 1/13/2025    PRIMARY CARE PROVIDER: Samira Abad      HPI:  Daly Seaman is a 44 year old female who is being referred today by her OB/GYN provider Dr. Unger for evaluation and treatment of symptomatic uterine fibroids.  The patient has a past medical history of IgA neuropathy, hyperparathyroidism, Factor V leiden mutation and kidney transplant.   The patient is here to discuss treatment of uterine fibroids via uterine artery embolization.  The patient's symptoms include mostly bulk related symptoms:  urinary frequency,dyspareunia and irregular bleeding.  The patient currently has an IUD in place.  The patient also reports an intolerance to steroids with severe adverse effects which include weight gain, insomnia, and intense anxiety.  The patient also had stage III chronic kidney disease with most recent creatinine 1.64.    PHH:    Past Medical History:   Diagnosis Date    History of blood transfusion April 2021    History of colposcopy 12/2022 1/2024    Hypertension     variable, based on health history    Thyroid disease     On watch, hypothyroid        Past Surgical History:   Procedure Laterality Date    ABDOMEN SURGERY  April 2021    IR THYROID BIOPSY  2/4/2020    IR THYROID BIOPSY  9/15/2015    TRANSPLANT  April 2012    kidney       ALLERGIES:  Wasp venom protein    MEDS:    Current Outpatient Medications:     albuterol (PROAIR HFA/PROVENTIL HFA/VENTOLIN HFA) 108 (90 Base) MCG/ACT inhaler, Inhale 2 puffs into the lungs as needed., Disp: , Rfl:     aspirin 81 MG EC tablet, Take 81 mg by mouth daily, Disp: , Rfl:     biotin 5 MG CAPS, Take 1 capsule by mouth, Disp: , Rfl:     Cholecalciferol (VITAMIN D3) 50 MCG (2000 UT) CAPS, Take 1 capsule by mouth daily., Disp: ,  Rfl:     levonorgestrel (MIRENA) 52 MG (20 mcg/day) IUD, 1 each by Intrauterine route, Disp: , Rfl:     lisinopril (ZESTRIL) 2.5 MG tablet, Take 1 tablet (2.5 mg) by mouth daily, Disp: , Rfl:     Multiple Vitamins-Minerals (ONCOVITE) TABS, Take 1 tablet by mouth daily, Disp: , Rfl:     mycophenolic acid (GENERIC EQUIVALENT) 180 MG EC tablet, Take 360mg (2 tablets) in the morning, take 180mg (1 tablet) midday, and take 360mg (2 tablets) in the evening., Disp: 150 tablet, Rfl: 11    pantoprazole (PROTONIX) 40 MG EC tablet, Take 1 tablet (40 mg) by mouth daily., Disp: 90 tablet, Rfl: 3    tacrolimus (GENERIC EQUIVALENT) 1 MG capsule, Take 2 capsules (2 mg) by mouth 2 times daily., Disp: 120 capsule, Rfl: 11    ferrous sulfate (FE TABS) 325 (65 Fe) MG EC tablet, Take 325 mg by mouth daily. (Patient not taking: Reported on 1/13/2025), Disp: , Rfl:     SOCIAL HABITS:    History   Smoking Status    Never   Smokeless Tobacco    Never     Social History    Substance and Sexual Activity      Alcohol use: Yes        Comment: occasional use.      History   Drug Use Unknown       FAMILY HISTORY:    Family History   Problem Relation Age of Onset    Genetic Disorder Mother         Osteogenesis imperfecta    Thyroid Disease Mother         hypo thyroid    Mental Illness Father     Genetic Disorder Brother         Osteogenesis imperfecta       REVIEW OF SYSTEMS:    A 12 point ROS was reviewed and except for what is listed in the HPI above, all others are negative    PE:  /76 (BP Location: Right arm, Patient Position: Chair, Cuff Size: Adult Regular)   Pulse 79   LMP 11/05/2024 (Approximate)   Wt Readings from Last 1 Encounters:   12/03/24 147 lb 14.4 oz (67.1 kg)     There is no height or weight on file to calculate BMI.    EXAM:  GENERAL: This is a well-developed 44 year old female who appears her stated age  EYES: Grossly normal.  MOUTH: Buccal mucosa normal   MUSCULOSKELETAL: Grossly normal and both lower extremities are  intact.  HEME/LYMPH: No lymphedema  NEUROLOGIC: Focally intact, Alert and oriented x 3.   PSYCH: appropriate affect  INTEGUMENT: No open lesions or ulcers        DIAGNOSTIC STUDIES:     Images:  Narrative & Impression   EXAM: MR PELVIS (GYN) W/O and W CONTRAST  LOCATION: [facility]  DATE: 3/5/2024     INDICATION:  Uterine leiomyoma, unspecified location  COMPARISON: None.  TECHNIQUE: Routine MRI female pelvis protocol including T1 in/out phase, diffusion, thin section high resolution multiplane T2, and post contrast T1.  CONTRAST: 6ml Gadavist     FINDINGS:      UTERUS: Uterus appears retroverted and retroflexed. Large T2 hypointense, heterogenous enhancing mass is seen along the superolateral posterior wall of the uterus measuring up to 7.9 x 7.8 x 8.1 cm (series 3, image 28). A few nabothian cysts are seen at   the cervix measuring up to 4 mm.     ENDOMETRIUM: 5 mm in thickness. The junctional zone is within normal limits. There appears to be an intrauterine device along the inferior portion of the endometrial cavity within the lower uterine segment with the superior margin of the device abutting   the inferior margin of the uterine fibroid (series 5, image 33).     ADNEXA: 3.7 x 1.9 cm T2 hyperintense cystic lesion is seen along the right adnexa. A few small cysts are also noted in the left adnexa measuring up to 1.4 x 1.1 cm.     ADDITIONAL FINDINGS: Small volume pelvic free fluid is present. Right lower quadrant renal transplant is partially visualized. A 1.0 cm cyst is noted along the inferior pole of the renal transplant. No significant incidental findings. No adenopathy.                                                                      IMPRESSION:  1.  Large, 8.1 cm uterine fibroid seen along the superior to posterior wall of the uterus.  2.  Bilateral simple appearing adnexal cysts measuring up to 3.7 cm on the right and 1.4 cm on the left.   3.  Small volume pelvic free fluid,  likely physiologic.  4.  Intrauterine device appears to be seen along the lower uterine segment within the endometrial canal with the superior margin abutting the uterine fibroid.           LABS:      Sodium   Date Value Ref Range Status   12/02/2024 136 135 - 145 mmol/L Final   11/15/2024 138 135 - 145 mmol/L Final   11/14/2024 139 135 - 145 mmol/L Final     Urea Nitrogen   Date Value Ref Range Status   12/02/2024 22.8 (H) 6.0 - 20.0 mg/dL Final   11/15/2024 23.9 (H) 6.0 - 20.0 mg/dL Final   11/14/2024 23.9 (H) 6.0 - 20.0 mg/dL Final     Hemoglobin   Date Value Ref Range Status   12/02/2024 11.9 11.7 - 15.7 g/dL Final   11/15/2024 11.3 (L) 11.7 - 15.7 g/dL Final   08/23/2024 11.7 11.7 - 15.7 g/dL Final     Platelet Count   Date Value Ref Range Status   12/02/2024 231 150 - 450 10e3/uL Final   11/15/2024 257 150 - 450 10e3/uL Final   08/23/2024 216 150 - 450 10e3/uL Final     Assessment/plan:  This is a pleasant 44-year-old female who is being seen today to discuss the options for treatment of symptomatic uterine fibroids via uterine artery embolization.  We reviewed and discussed the results of her MRI of the pelvis that was completed on 3/5/2024.  There is a large 8.1 cm uterine fibroid seen along the superior to posterior wall of the uterus.  Bilateral simple appearing adnexal cyst measuring up to 3.7 cm on the right and 1.4 cm on the left.  Intrauterine device is in place.  We discussed the procedure of uterine artery embolization involves an angiogram where the uterine arteries are selected and embolization is performed.  This will essentially then shut off the blood supply to the fibroid.  The uterus does viable with collateral blood supply.  We discussed that this is an outpatient procedure however we do have the patient spend the night to manage pain and nausea. We discuss our usual protocol is to give Toradol as well as a Medrol pack postoperatively to control pain.  We also discussed that contrast is used  during the procedure.  Since the patient has underlying chronic kidney disease as well as intolerance to steroids, her management of symptoms as well as the risk of contrast-induced neuropathy needs to be considered.  We discussed that we will limit the amount of contrast that is given during the procedure.  Postoperative pain management medications would include IV narcotics as well as possible IV Tylenol and antinausea medications.  IV fluids would also need to be given pre and postprocedure.    The patient will continue conversation with nephrology as well as her OB/GYN regarding treatment options.  She will let us know if she plans to proceed with the procedure.  This was a in person visit in which 45 minutes of  total time was spent (either in face-to-face or non-face-to-face time).      Dr. Quentin Guerra MD  Interventional Radiology  Pager: [phone]  [facility]

## 2025-01-20 NOTE — PROGRESS NOTES
[facility]  OB/GYN Clinic Note    Daly is a 44 year old No obstetric history on file. female who presents for annual exam.   Besides routine health maintenance,  she would like to discuss HRT and what it would look like..    HPI:  The patient's PCP is  Samira Abad MD.    Patient presents for annual exam. Wanting to discuss HRT. Getting tired at the end of the day. Having brain fog. Worse in the last year. Also wanting to discuss fibroids, periods. Period every two weeks. Wanting to discuss a solution for this.   Pap is due today.   Sexualy active yes, STI- . Contraception- IUD since June 2023. Family Planning- not planning future pregnancy.   Mammogram- done today, colonoscopy- due November  Immunizations: up to date  Family hx of breast, uterine, ovarian cancer- no changes  Lives with , daughter, and dog feels safe. Denies tobacco use. Drug use, alcohol use.       GYNECOLOGIC HISTORY:    Patient's last menstrual period was 11/05/2024 (approximate).  Regular menses? yes  Menses every 14 days.spotting every other 14 days   Length of menses: 2 -3 days    Her current contraception method is: IUD.  She  reports that she has never smoked. She has never used smokeless tobacco.  Patient is sexually active.  STD testing offered?  Declined  Last PHQ-9 score on record =       1/21/2025    12:06 PM   PHQ-9 SCORE   PHQ-9 Total Score 10     Last GAD7 score on record =       1/21/2025    12:06 PM   CYNTHIA-7 SCORE   Total Score 3     Alcohol Score = 1    HEALTH MAINTENANCE:  Cholesterol:   Recent Labs   Lab Test 12/02/23  0904   CHOL 158   HDL 76   LDL 71   TRIG 53       Last Mammo: One year ago, Result: Normal, Next Mammo: Today   Pap:   Lab Results   Component Value Date    GYNINTERP  11/14/2023     Low-grade squamous intraepithelial lesion (LSIL) encompassing HPV/mild dysplasia/CIN1    HPV-  Colonoscopy:  NA, Result: Not applicable, Next Colonoscopy: Due at age 45  Dexa:  NA  Health maintenance  updated:  yes    HISTORY:  OB History   No obstetric history on file.       Patient Active Problem List   Diagnosis    Annual physical exam    IgA nephropathy    Kidney replaced by transplant    Thyroid nodule    Elevated BP without diagnosis of hypertension    Cervical cancer screening    Dysplasia of cervix, low grade (LAUREEN 1)    Nausea and vomiting, unspecified vomiting type    Immunosuppression    Hyperparathyroidism    Heterozygous factor V Leiden mutation    Stage 3a chronic kidney disease (H)    Other fatigue    Vitamin D deficiency    Chronic pain of left knee    Kidney transplant recipient    Chronic patellofemoral pain of left knee     Past Surgical History:   Procedure Laterality Date    ABDOMEN SURGERY  April 2021    IR THYROID BIOPSY  2/4/2020    IR THYROID BIOPSY  9/15/2015    TRANSPLANT  April 2012    kidney      Social History     Tobacco Use    Smoking status: Never    Smokeless tobacco: Never   Substance Use Topics    Alcohol use: Yes     Comment: occasional use.      Problem (# of Occurrences) Relation (Name,Age of Onset)    Mental Illness (1) Father (Randell)    Genetic Disorder (2) Mother (Digna): Osteogenesis imperfecta, Brother (Joshua): Osteogenesis imperfecta    Thyroid Disease (1) Mother (Digna): hypo thyroid              Current Outpatient Medications   Medication Sig Dispense Refill    albuterol (PROAIR HFA/PROVENTIL HFA/VENTOLIN HFA) 108 (90 Base) MCG/ACT inhaler Inhale 2 puffs into the lungs as needed.      aspirin 81 MG EC tablet Take 81 mg by mouth daily      biotin 5 MG CAPS Take 1 capsule by mouth      Cholecalciferol (VITAMIN D3) 50 MCG (2000 UT) CAPS Take 1 capsule by mouth daily.      levonorgestrel (MIRENA) 52 MG (20 mcg/day) IUD 1 each by Intrauterine route      lisinopril (ZESTRIL) 2.5 MG tablet Take 1 tablet (2.5 mg) by mouth daily      Multiple Vitamins-Minerals (ONCOVITE) TABS Take 1 tablet by mouth daily      mycophenolic acid (GENERIC EQUIVALENT) 180 MG EC tablet Take 360mg (2  "tablets) in the morning, take 180mg (1 tablet) midday, and take 360mg (2 tablets) in the evening. 150 tablet 11    pantoprazole (PROTONIX) 40 MG EC tablet Take 1 tablet (40 mg) by mouth daily. 90 tablet 3    tacrolimus (GENERIC EQUIVALENT) 1 MG capsule Take 2 capsules (2 mg) by mouth 2 times daily. 120 capsule 11    ferrous sulfate (FE TABS) 325 (65 Fe) MG EC tablet Take 325 mg by mouth daily. (Patient not taking: Reported on 1/21/2025)       No current facility-administered medications for this visit.     Allergies   Allergen Reactions    Wasp Venom Protein Unknown     Past medical, surgical, social and family histories were reviewed and updated in EPIC.    ROS:   12 point review of systems negative other than symptoms noted below or in the HPI.  No urinary frequency or dysuria, bladder or kidney problems    EXAM:  /76   Ht 1.778 m (5' 10\")   Wt 62.5 kg (137 lb 12.8 oz)   LMP 11/05/2024 (Approximate)   BMI 19.77 kg/m     BMI: Body mass index is 19.77 kg/m .    PHYSICAL EXAM:  Constitutional:   Appearance: Well nourished, well developed, alert, in no acute distress  Breasts: Inspection of Breasts:  No lymphadenopathy present., Palpation of Breasts and Axillae:  No masses present on palpation, no breast tenderness., Axillary Lymph Nodes:  No lymphadenopathy present., symmetric fibrocystic changes and density bilaterally, asymmetry or nipple discharge bilaterally.         Pelvic Exam:  External Genitalia:     Normal appearance for age, no discharge present, no tenderness present, no inflammatory lesions present, color normal  Vagina:     Normal vaginal vault without central or paravaginal defects, no discharge present, no inflammatory lesions present, no masses present  Bladder:     Nontender to palpation  Urethra:   Urethral Body:  Urethra palpation normal, urethra structural support normal   Urethral Meatus:  No erythema or lesions present  Cervix:     Appearance very anteverted, IUD strings visualized. " Healthy, no lesions present, nontender to palpation, no bleeding present  Uterus:     Uterus: firm, enlarged, 12w sized and nontender, retroverted in position. Large fibroid palpated in posterior cul-du-sac      Adnexa:     No adnexal tenderness present, no adnexal masses present    COUNSELING:   Reviewed preventive health counseling, as reflected in patient instructions    BMI: Body mass index is 19.77 kg/m .      ASSESSMENT:  44 year old female with satisfactory annual exam.    ICD-10-CM    1. Encounter for annual routine gynecological examination  Z01.419 HPV and Gynecologic Cytology Panel - Recommended Age 30-65 Years          PLAN:  Exam with findings above. Pap collected. Mammogram complete. Reviewed breast self awareness.   Follow-up to discuss AUB-L, bulk symptoms, perimenopause symptoms, HRT.     Paty Unger MD, MHS  01/21/25

## 2025-01-21 ENCOUNTER — ANCILLARY PROCEDURE (OUTPATIENT)
Dept: MAMMOGRAPHY | Facility: CLINIC | Age: 45
End: 2025-01-21
Payer: COMMERCIAL

## 2025-01-21 ENCOUNTER — OFFICE VISIT (OUTPATIENT)
Dept: OBGYN | Facility: CLINIC | Age: 45
End: 2025-01-21
Payer: COMMERCIAL

## 2025-01-21 VITALS
DIASTOLIC BLOOD PRESSURE: 76 MMHG | SYSTOLIC BLOOD PRESSURE: 128 MMHG | WEIGHT: 137.8 LBS | BODY MASS INDEX: 19.73 KG/M2 | HEIGHT: 70 IN

## 2025-01-21 DIAGNOSIS — Z12.31 VISIT FOR SCREENING MAMMOGRAM: ICD-10-CM

## 2025-01-21 DIAGNOSIS — Z01.419 ENCOUNTER FOR ANNUAL ROUTINE GYNECOLOGICAL EXAMINATION: Primary | ICD-10-CM

## 2025-01-21 PROCEDURE — 87624 HPV HI-RISK TYP POOLED RSLT: CPT | Performed by: STUDENT IN AN ORGANIZED HEALTH CARE EDUCATION/TRAINING PROGRAM

## 2025-01-21 PROCEDURE — 77063 BREAST TOMOSYNTHESIS BI: CPT | Mod: TC | Performed by: RADIOLOGY

## 2025-01-21 PROCEDURE — 99396 PREV VISIT EST AGE 40-64: CPT | Performed by: STUDENT IN AN ORGANIZED HEALTH CARE EDUCATION/TRAINING PROGRAM

## 2025-01-21 PROCEDURE — 77067 SCR MAMMO BI INCL CAD: CPT | Mod: TC | Performed by: RADIOLOGY

## 2025-01-21 ASSESSMENT — PATIENT HEALTH QUESTIONNAIRE - PHQ9
5. POOR APPETITE OR OVEREATING: SEVERAL DAYS
SUM OF ALL RESPONSES TO PHQ QUESTIONS 1-9: 10

## 2025-01-21 ASSESSMENT — ANXIETY QUESTIONNAIRES
7. FEELING AFRAID AS IF SOMETHING AWFUL MIGHT HAPPEN: NOT AT ALL
GAD7 TOTAL SCORE: 3
GAD7 TOTAL SCORE: 3
1. FEELING NERVOUS, ANXIOUS, OR ON EDGE: SEVERAL DAYS
5. BEING SO RESTLESS THAT IT IS HARD TO SIT STILL: NOT AT ALL
IF YOU CHECKED OFF ANY PROBLEMS ON THIS QUESTIONNAIRE, HOW DIFFICULT HAVE THESE PROBLEMS MADE IT FOR YOU TO DO YOUR WORK, TAKE CARE OF THINGS AT HOME, OR GET ALONG WITH OTHER PEOPLE: SOMEWHAT DIFFICULT
2. NOT BEING ABLE TO STOP OR CONTROL WORRYING: NOT AT ALL
6. BECOMING EASILY ANNOYED OR IRRITABLE: SEVERAL DAYS
3. WORRYING TOO MUCH ABOUT DIFFERENT THINGS: NOT AT ALL

## 2025-01-22 LAB
HPV HR 12 DNA CVX QL NAA+PROBE: NEGATIVE
HPV16 DNA CVX QL NAA+PROBE: NEGATIVE
HPV18 DNA CVX QL NAA+PROBE: NEGATIVE
HUMAN PAPILLOMA VIRUS FINAL DIAGNOSIS: NORMAL

## 2025-01-27 ENCOUNTER — NURSE TRIAGE (OUTPATIENT)
Dept: FAMILY MEDICINE | Facility: CLINIC | Age: 45
End: 2025-01-27
Payer: COMMERCIAL

## 2025-01-27 NOTE — TELEPHONE ENCOUNTER
"1. DIARRHEA SEVERITY: \"How bad is the diarrhea?\" \"How many more stools have you had in the past 24 hours than normal?\"       Liquid, brownish, 3 episodes this morning. Every 90 minutes in the mornings.   2. ONSET: \"When did the diarrhea begin?\"       4 days of \"severe illness.\" Then 6 days of about 3 episodes in the mornings.   3. STOOL DESCRIPTION:  \"How loose or watery is the diarrhea?\" \"What is the stool color?\" \"Is there any blood or mucous in the stool?\"      Loose and brown liquid.   4. VOMITING: \"Are you also vomiting?\" If Yes, ask: \"How many times in the past 24 hours?\"       Vomiting - first 4 days. Once a day for another 3 days. Last episode on 1/24/25.   5. ABDOMEN PAIN: \"Are you having any abdomen pain?\" If Yes, ask: \"What does it feel like?\" (e.g., crampy, dull, intermittent, constant)       Mild - comes and goes.   6. ABDOMEN PAIN SEVERITY: If present, ask: \"How bad is the pain?\"  (e.g., Scale 1-10; mild, moderate, or severe)      2/10 - comes and goes. Belly feels very \"acidic.\"  7. ORAL INTAKE: If vomiting, \"Have you been able to drink liquids?\" \"How much liquids have you had in the past 24 hours?\"      40 - 48 oz per day. Slowly over the day.   8. HYDRATION: \"Any signs of dehydration?\" (e.g., dry mouth [not just dry lips], too weak to stand, dizziness, new weight loss) \"When did you last urinate?\"      Feels dehydrated, weak to stand, lost 10 pounds, not usually dizzy - dizzy when stand up too quickly. Urinated last 5 minutes ago - almost perfectly clear. Tried Gatorade in the beginning, but has vomited that so she stopped. Advised to try pain pedialyte in small sips.   9. EXPOSURE: \"Have you traveled to a foreign country recently?\" \"Have you been exposed to anyone with diarrhea?\" \"Could you have eaten any food that was spoiled?\"      Yes - daughter has diarrhea, norovirus at school.   10. ANTIBIOTIC USE: \"Are you taking antibiotics now or have you taken antibiotics in the past 2 months?\"        No. " "  11. OTHER SYMPTOMS: \"Do you have any other symptoms?\" (e.g., fever, blood in stool)        No. Kidney replaced by transplant. Has not eaten any raw fruits and veggies in the last 10 days.   12. PREGNANCY: \"Is there any chance you are pregnant?\" \"When was your last menstrual period?\"        No, currently having menstrual cycle.     Patient is concerned that she is not getting better and she is not sure if she should try Imodium with her kidney condition. Advised OV now, pt unable to be seen before 4:30. Advised UC as soon as possible. Pt agreed with this plan.     Reason for Disposition   Weak immune system (e.g., HIV positive, cancer chemo, splenectomy, organ transplant, chronic steroids)    Additional Information   Negative: SEVERE abdominal pain (e.g., excruciating) and present > 1 hour   Negative: SEVERE abdominal pain and age > 60 years   Negative: Bloody, black, or tarry bowel movements  (Exception: Chronic-unchanged black-grey bowel movements and is taking iron pills or Pepto-Bismol.)   Negative: SEVERE diarrhea (e.g., 7 or more times / day more than normal) and age > 60 years   Negative: Constant abdominal pain lasting > 2 hours   Negative: Drinking very little and dehydration suspected (e.g., no urine > 12 hours, very dry mouth, very lightheaded)   Negative: Patient sounds very sick or weak to the triager   Negative: MODERATE diarrhea (e.g., 4-6 times / day more than normal) and present > 48 hours (2 days)   Negative: SEVERE diarrhea (e.g., 7 or more times / day more than normal) and present > 24 hours (1 day)   Negative: MODERATE diarrhea (e.g., 4-6 times / day more than normal) and age > 70 years   Negative: Abdominal pain  (Exceptions: Pain clears completely with each passage of diarrhea stool,  or symptoms similar to previously diagnosed irritable bowel syndrome.)   Negative: Fever > 101 F (38.3 C)   Negative: Blood in the stool  (Exception: Only on toilet paper. Reason: Diarrhea can cause rectal " irritation with blood on wiping.)   Negative: Mucus or pus in stool has been present > 2 days and diarrhea is more than mild    Protocols used: Diarrhea-A-OH

## 2025-01-28 ENCOUNTER — PATIENT OUTREACH (OUTPATIENT)
Dept: OBGYN | Facility: CLINIC | Age: 45
End: 2025-01-28
Payer: COMMERCIAL

## 2025-01-28 LAB
BKR AP ASSOCIATED HPV REPORT: ABNORMAL
BKR LAB AP GYN ADEQUACY: ABNORMAL
BKR LAB AP GYN INTERPRETATION: ABNORMAL
BKR LAB AP PREVIOUS ABNL DX: ABNORMAL
BKR LAB AP PREVIOUS ABNORMAL: ABNORMAL
PATH REPORT.COMMENTS IMP SPEC: ABNORMAL
PATH REPORT.COMMENTS IMP SPEC: ABNORMAL
PATH REPORT.RELEVANT HX SPEC: ABNORMAL

## 2025-01-30 ENCOUNTER — TELEPHONE (OUTPATIENT)
Dept: TRANSPLANT | Facility: CLINIC | Age: 45
End: 2025-01-30
Payer: COMMERCIAL

## 2025-01-30 ENCOUNTER — LAB (OUTPATIENT)
Dept: LAB | Facility: CLINIC | Age: 45
End: 2025-01-30
Payer: COMMERCIAL

## 2025-01-30 DIAGNOSIS — Z48.22 ENCOUNTER FOR AFTERCARE FOLLOWING KIDNEY TRANSPLANT: ICD-10-CM

## 2025-01-30 DIAGNOSIS — R11.2 NAUSEA AND VOMITING, UNSPECIFIED VOMITING TYPE: ICD-10-CM

## 2025-01-30 DIAGNOSIS — Z48.22 ENCOUNTER FOR AFTERCARE FOLLOWING KIDNEY TRANSPLANT: Primary | ICD-10-CM

## 2025-01-30 DIAGNOSIS — R79.89 ELEVATED SERUM CREATININE: ICD-10-CM

## 2025-01-30 DIAGNOSIS — D84.9 IMMUNOSUPPRESSED STATUS: ICD-10-CM

## 2025-01-30 DIAGNOSIS — Z94.0 KIDNEY TRANSPLANT RECIPIENT: ICD-10-CM

## 2025-01-30 DIAGNOSIS — D84.9 IMMUNOSUPPRESSION: ICD-10-CM

## 2025-01-30 LAB
ANION GAP SERPL CALCULATED.3IONS-SCNC: 10 MMOL/L (ref 7–15)
BASOPHILS # BLD AUTO: 0.1 10E3/UL (ref 0–0.2)
BASOPHILS NFR BLD AUTO: 1 %
BUN SERPL-MCNC: 20.4 MG/DL (ref 6–20)
CALCIUM SERPL-MCNC: 9.5 MG/DL (ref 8.8–10.4)
CHLORIDE SERPL-SCNC: 108 MMOL/L (ref 98–107)
CREAT SERPL-MCNC: 1.74 MG/DL (ref 0.51–0.95)
EGFRCR SERPLBLD CKD-EPI 2021: 36 ML/MIN/1.73M2
EOSINOPHIL # BLD AUTO: 0.1 10E3/UL (ref 0–0.7)
EOSINOPHIL NFR BLD AUTO: 2 %
ERYTHROCYTE [DISTWIDTH] IN BLOOD BY AUTOMATED COUNT: 12.2 % (ref 10–15)
GLUCOSE SERPL-MCNC: 98 MG/DL (ref 70–99)
HCO3 SERPL-SCNC: 21 MMOL/L (ref 22–29)
HCT VFR BLD AUTO: 36.9 % (ref 35–47)
HGB BLD-MCNC: 12 G/DL (ref 11.7–15.7)
IMM GRANULOCYTES # BLD: 0 10E3/UL
IMM GRANULOCYTES NFR BLD: 0 %
LYMPHOCYTES # BLD AUTO: 1.6 10E3/UL (ref 0.8–5.3)
LYMPHOCYTES NFR BLD AUTO: 18 %
MCH RBC QN AUTO: 28.4 PG (ref 26.5–33)
MCHC RBC AUTO-ENTMCNC: 32.5 G/DL (ref 31.5–36.5)
MCV RBC AUTO: 87 FL (ref 78–100)
MONOCYTES # BLD AUTO: 0.6 10E3/UL (ref 0–1.3)
MONOCYTES NFR BLD AUTO: 6 %
NEUTROPHILS # BLD AUTO: 6.2 10E3/UL (ref 1.6–8.3)
NEUTROPHILS NFR BLD AUTO: 73 %
NRBC # BLD AUTO: 0 10E3/UL
NRBC BLD AUTO-RTO: 0 /100
PLATELET # BLD AUTO: 251 10E3/UL (ref 150–450)
POTASSIUM SERPL-SCNC: 4.4 MMOL/L (ref 3.4–5.3)
RBC # BLD AUTO: 4.22 10E6/UL (ref 3.8–5.2)
SODIUM SERPL-SCNC: 139 MMOL/L (ref 135–145)
WBC # BLD AUTO: 8.6 10E3/UL (ref 4–11)

## 2025-01-30 PROCEDURE — 36415 COLL VENOUS BLD VENIPUNCTURE: CPT

## 2025-01-30 PROCEDURE — 82374 ASSAY BLOOD CARBON DIOXIDE: CPT

## 2025-01-30 PROCEDURE — 82310 ASSAY OF CALCIUM: CPT

## 2025-01-30 PROCEDURE — 85004 AUTOMATED DIFF WBC COUNT: CPT

## 2025-01-30 PROCEDURE — 87507 IADNA-DNA/RNA PROBE TQ 12-25: CPT

## 2025-01-30 PROCEDURE — 80048 BASIC METABOLIC PNL TOTAL CA: CPT

## 2025-01-30 PROCEDURE — 87493 C DIFF AMPLIFIED PROBE: CPT

## 2025-01-30 RX ORDER — HEPARIN SODIUM,PORCINE 10 UNIT/ML
5-20 VIAL (ML) INTRAVENOUS DAILY PRN
OUTPATIENT
Start: 2025-01-31

## 2025-01-30 RX ORDER — MEPERIDINE HYDROCHLORIDE 25 MG/ML
25 INJECTION INTRAMUSCULAR; INTRAVENOUS; SUBCUTANEOUS
OUTPATIENT
Start: 2025-01-31

## 2025-01-30 RX ORDER — DIPHENHYDRAMINE HYDROCHLORIDE 50 MG/ML
50 INJECTION INTRAMUSCULAR; INTRAVENOUS
Start: 2025-01-31

## 2025-01-30 RX ORDER — DIPHENHYDRAMINE HYDROCHLORIDE 50 MG/ML
25 INJECTION INTRAMUSCULAR; INTRAVENOUS
Start: 2025-01-31

## 2025-01-30 RX ORDER — METHYLPREDNISOLONE SODIUM SUCCINATE 40 MG/ML
40 INJECTION INTRAMUSCULAR; INTRAVENOUS
Start: 2025-01-31

## 2025-01-30 RX ORDER — ALBUTEROL SULFATE 90 UG/1
1-2 INHALANT RESPIRATORY (INHALATION)
Start: 2025-01-31

## 2025-01-30 RX ORDER — EPINEPHRINE 1 MG/ML
0.3 INJECTION, SOLUTION, CONCENTRATE INTRAVENOUS EVERY 5 MIN PRN
OUTPATIENT
Start: 2025-01-31

## 2025-01-30 RX ORDER — HEPARIN SODIUM (PORCINE) LOCK FLUSH IV SOLN 100 UNIT/ML 100 UNIT/ML
5 SOLUTION INTRAVENOUS
OUTPATIENT
Start: 2025-01-31

## 2025-01-30 RX ORDER — ALBUTEROL SULFATE 0.83 MG/ML
2.5 SOLUTION RESPIRATORY (INHALATION)
OUTPATIENT
Start: 2025-01-31

## 2025-01-30 NOTE — TELEPHONE ENCOUNTER
Patient Call: General  Route to LPN    Reason for call: Pt called to discuss current symptoms. Starting 2 weekends ago, she started having vomiting and diarrhea, suspects it to be norovirus. She has 'done all the right things,' incl liquid diet and no dairy. She has felt just as ill until on Monday 1/27 evening she took one dose of amodium A D. She felt a lot better on Tuesday, and on Wednesday she said she had no symptoms. Starting today she is vomiting and has diarrhea again, no fever but has a hard time keeping food down. She is unsure what further steps she can take    Call back needed? Yes    Return Call Needed  Same as documented in contacts section  When to return call?: Same day: Route High Priority

## 2025-01-30 NOTE — TELEPHONE ENCOUNTER
Spoke with Daly, who has been having ongoing diarrhea and vomiting for the last 2 weeks on and off. Her daughter had the same symptoms and believes it was likely norovirus. Her illness started 2 weeks ago and she had vomiting and diarrhea for 4 days. It improved with clear liquid diet. She took 1 immodium A D which helped for 2 days, now illness has returned as it was previously. Enteric panel and BMP/CBC ordered.

## 2025-01-31 ENCOUNTER — MYC MEDICAL ADVICE (OUTPATIENT)
Dept: OTHER | Age: 45
End: 2025-01-31

## 2025-01-31 LAB
ADV 40+41 DNA STL QL NAA+NON-PROBE: NEGATIVE
ASTRO TYP 1-8 RNA STL QL NAA+NON-PROBE: NEGATIVE
C CAYETANENSIS DNA STL QL NAA+NON-PROBE: NEGATIVE
C DIFF TOX B STL QL: NEGATIVE
CAMPYLOBACTER DNA SPEC NAA+PROBE: NEGATIVE
CMV DNA SPEC NAA+PROBE-ACNC: NOT DETECTED IU/ML
CRYPTOSP DNA STL QL NAA+NON-PROBE: NEGATIVE
E COLI O157 DNA STL QL NAA+NON-PROBE: ABNORMAL
E HISTOLYT DNA STL QL NAA+NON-PROBE: NEGATIVE
EAEC ASTA GENE ISLT QL NAA+PROBE: NEGATIVE
EC STX1+STX2 GENES STL QL NAA+NON-PROBE: NEGATIVE
EPEC EAE GENE STL QL NAA+NON-PROBE: NEGATIVE
ETEC LTA+ST1A+ST1B TOX ST NAA+NON-PROBE: NEGATIVE
G LAMBLIA DNA STL QL NAA+NON-PROBE: NEGATIVE
NOROVIRUS GI+II RNA STL QL NAA+NON-PROBE: POSITIVE
P SHIGELLOIDES DNA STL QL NAA+NON-PROBE: NEGATIVE
RVA RNA STL QL NAA+NON-PROBE: NEGATIVE
SALMONELLA SP RPOD STL QL NAA+PROBE: NEGATIVE
SAPO I+II+IV+V RNA STL QL NAA+NON-PROBE: NEGATIVE
SHIGELLA SP+EIEC IPAH ST NAA+NON-PROBE: NEGATIVE
SPECIMEN TYPE: NORMAL
V CHOLERAE DNA SPEC QL NAA+PROBE: NEGATIVE
VIBRIO DNA SPEC NAA+PROBE: NEGATIVE
Y ENTEROCOL DNA STL QL NAA+PROBE: NEGATIVE

## 2025-02-08 ENCOUNTER — INFUSION THERAPY VISIT (OUTPATIENT)
Dept: INFUSION THERAPY | Facility: CLINIC | Age: 45
End: 2025-02-08
Attending: INTERNAL MEDICINE
Payer: COMMERCIAL

## 2025-02-08 VITALS
TEMPERATURE: 98.5 F | OXYGEN SATURATION: 100 % | HEART RATE: 68 BPM | DIASTOLIC BLOOD PRESSURE: 76 MMHG | SYSTOLIC BLOOD PRESSURE: 118 MMHG | RESPIRATION RATE: 16 BRPM

## 2025-02-08 DIAGNOSIS — R79.89 ELEVATED SERUM CREATININE: ICD-10-CM

## 2025-02-08 DIAGNOSIS — R11.2 NAUSEA AND VOMITING, UNSPECIFIED VOMITING TYPE: Primary | ICD-10-CM

## 2025-02-08 DIAGNOSIS — Z94.0 KIDNEY TRANSPLANT RECIPIENT: ICD-10-CM

## 2025-02-08 DIAGNOSIS — D84.9 IMMUNOSUPPRESSION: ICD-10-CM

## 2025-02-08 PROCEDURE — 258N000003 HC RX IP 258 OP 636: Performed by: INTERNAL MEDICINE

## 2025-02-08 PROCEDURE — 96360 HYDRATION IV INFUSION INIT: CPT

## 2025-02-08 RX ORDER — METHYLPREDNISOLONE SODIUM SUCCINATE 40 MG/ML
40 INJECTION INTRAMUSCULAR; INTRAVENOUS
Status: CANCELLED
Start: 2025-02-08

## 2025-02-08 RX ORDER — DIPHENHYDRAMINE HYDROCHLORIDE 50 MG/ML
50 INJECTION INTRAMUSCULAR; INTRAVENOUS
Status: CANCELLED
Start: 2025-02-08

## 2025-02-08 RX ORDER — DIPHENHYDRAMINE HYDROCHLORIDE 50 MG/ML
25 INJECTION INTRAMUSCULAR; INTRAVENOUS
Status: CANCELLED
Start: 2025-02-08

## 2025-02-08 RX ORDER — MEPERIDINE HYDROCHLORIDE 25 MG/ML
25 INJECTION INTRAMUSCULAR; INTRAVENOUS; SUBCUTANEOUS
Status: CANCELLED | OUTPATIENT
Start: 2025-02-08

## 2025-02-08 RX ORDER — ALBUTEROL SULFATE 0.83 MG/ML
2.5 SOLUTION RESPIRATORY (INHALATION)
Status: CANCELLED | OUTPATIENT
Start: 2025-02-08

## 2025-02-08 RX ORDER — HEPARIN SODIUM,PORCINE 10 UNIT/ML
5-20 VIAL (ML) INTRAVENOUS DAILY PRN
Status: CANCELLED | OUTPATIENT
Start: 2025-02-08

## 2025-02-08 RX ORDER — HEPARIN SODIUM (PORCINE) LOCK FLUSH IV SOLN 100 UNIT/ML 100 UNIT/ML
5 SOLUTION INTRAVENOUS
Status: CANCELLED | OUTPATIENT
Start: 2025-02-08

## 2025-02-08 RX ORDER — ALBUTEROL SULFATE 90 UG/1
1-2 INHALANT RESPIRATORY (INHALATION)
Status: CANCELLED
Start: 2025-02-08

## 2025-02-08 RX ORDER — EPINEPHRINE 1 MG/ML
0.3 INJECTION, SOLUTION INTRAMUSCULAR; SUBCUTANEOUS EVERY 5 MIN PRN
Status: CANCELLED | OUTPATIENT
Start: 2025-02-08

## 2025-02-08 RX ADMIN — SODIUM CHLORIDE 1000 ML: 900 INJECTION, SOLUTION INTRAVENOUS at 12:14

## 2025-02-08 ASSESSMENT — PAIN SCALES - GENERAL: PAINLEVEL_OUTOF10: NO PAIN (0)

## 2025-02-08 NOTE — PROGRESS NOTES
Infusion Nursing Note:  Daly Seaman presents today for IV fluids.    Patient seen by provider today: No   present during visit today: Not Applicable.    Note: Pt here for fluids. She is recovering from norovirus.    Intravenous Access:  Peripheral IV placed.    Treatment Conditions:  Not Applicable.    Post Infusion Assessment:  Patient tolerated infusion without incident.  Site patent and intact, free from redness, edema or discomfort.  Access discontinued per protocol.     Discharge Plan:   Patient and/or family verbalized understanding of discharge instructions and all questions answered.  Patient discharged in stable condition accompanied by: self.    Administrations This Visit       sodium chloride 0.9% BOLUS 1,000 mL       Admin Date  02/08/2025 Action  $New Bag Dose  1,000 mL Route  Intravenous Documented By  Marilee Rahman RN                  /76 (BP Location: Left arm, Patient Position: Sitting, Cuff Size: Adult Regular)   Pulse 68   Temp 98.5  F (36.9  C) (Oral)   Resp 16   SpO2 100%     Marilee Rahman RN

## 2025-02-08 NOTE — PATIENT INSTRUCTIONS
Dear Daly Seaman    Thank you for choosing [facility] ([facility] for your infusion.  The following information is a summary of our appointment as well as important reminders.      If you are a transplant patient and require transplant education, please click on this link: [email or web address].    If you have any questions on your upcoming Specialty Infusion appointments, please call scheduling at [phone].  It was a pleasure taking care of you today.    Sincerely,    [facility]  [address] [place] [address]  [id number]  Phone:  [phone]

## 2025-02-17 ENCOUNTER — MYC MEDICAL ADVICE (OUTPATIENT)
Dept: NEPHROLOGY | Facility: CLINIC | Age: 45
End: 2025-02-17
Payer: COMMERCIAL

## 2025-02-17 DIAGNOSIS — E55.9 VITAMIN D DEFICIENCY: ICD-10-CM

## 2025-02-17 DIAGNOSIS — D64.9 ANEMIA: ICD-10-CM

## 2025-02-17 DIAGNOSIS — N02.B9 IGA NEPHROPATHY: Primary | ICD-10-CM

## 2025-02-17 DIAGNOSIS — N18.31 STAGE 3A CHRONIC KIDNEY DISEASE (H): ICD-10-CM

## 2025-02-23 ENCOUNTER — MYC MEDICAL ADVICE (OUTPATIENT)
Dept: FAMILY MEDICINE | Facility: CLINIC | Age: 45
End: 2025-02-23
Payer: COMMERCIAL

## 2025-02-23 DIAGNOSIS — R03.0 ELEVATED BP WITHOUT DIAGNOSIS OF HYPERTENSION: ICD-10-CM

## 2025-02-24 NOTE — PROGRESS NOTES
"[facility]  OB/GYN Clinic Note    Daly Seaman is a 44 year old female who is being evaluated via a patient initiated billable telephone visit.     What phone number would you like to be contacted at? [phone]  How would you like to obtain your AVS? Janet    Originating Location (pt. Location): work from home      Distant Location (provider location):  On-site    Reason for telephone (audio-only): Patient did not consent to video      SUBJECTIVE:                                                   Daly Seaman is a 44 year old female who presents for virtual visit today for the following health issue(s):   Additional information: would like to discuss Hysterectomy  options and HRT options    Virtual Visit Details    Type of service:  Telephone Visit   Phone call duration: 19 minutes   Originating Location (pt. Location): Home  Distant Location (provider location):  On-site  Telephone visit completed due to the patient did not consent to a video visit.      HPI:  Seen today to discuss AUB-L, bulk symptoms. Had Norovirus and still recovering.   Very interested in hysterectomy. Continued to have pressure in abdomen and in transplanted kidney area, affecting bowel movements..   Still having a period every 2 weeks, for last two-three years. Has had IUD placed/replaced for >10 years. Used to be more effective in amenorrhea until recently.  Would like to discuss perimenopause symptoms. Exhausted all the time. Waking up at 3am. Fitbit watch states no issues with sleep. Feeling like she night sweats once a week. No fevers. Mood is \"general malaise\". Feeling indifferent. History of depression when younger but this feels different.       No LMP recorded. (Menstrual status: IUD)..   Patient is sexually active, No obstetric history on file..  Using IUD for contraception.    reports that she has never smoked. She has never used smokeless tobacco.  Health maintenance updated:  " yes      OBJECTIVE:     No vitals were obtained today due to virtual telephone visit.    Physical Exam  GENERAL: alert and no distress          ASSESSMENT/PLAN:                                                      Phone call duration: 19 minutes      ICD-10-CM    1. Uterine leiomyoma, unspecified location  D25.9 Adult Oncology/Hematology  Referral      2. Kidney replaced by transplant  Z94.0 Adult Oncology/Hematology  Referral      3. Perimenopausal symptoms  N95.1 estradiol (VIVELLE-DOT) 0.025 MG/24HR bi-weekly patch        Daly Seaman is a 44 year old with enlarged fibroid uterus. See previous notes regarding consultation and evaluation. She is s/p referral to IR. She would like to proceed with definitive management. Due to complex surgical history, advise consultation with gyn onc for further discussion and for availability of transplant team at [facility]. Discussed likely need for open surgery due to enlarged uterus.    Discussed her constellation of perimenopause symptoms. We discussed the risks, contraindications, benefits and alternatives of hormone replacement therapy. I discussed the fact that this medication does increase her risk of deep vein thrombosis, stroke, heart attack, and pulmonary embolism.  It has also been associated with a slight increase in the stages of breast cancer at diagnosis.  She has no personal history of health issues that would be an absolute contraindication to this medication.  We also discussed that current recommendations state that she should take this medication at the lowest dose possible for the least amount of time possible, but these numbers are different for each patient.  We will revisit the dosage at each subsequent visit.  She understands these risks and would like to try the medication.  Discussed why the patch is the preferred route of administration due to lower risk of DVT.   Only estrogen is needed due to Mirena IUD in place. If dose/formulation  are not showing improvement in 3-6 months, follow-up to discuss alternatives. Can consider progesterone for sleep. Consider eval with psych/sleep med if menopausal hormone therapy are not resolving mood and sleep symtpoms.   One year rx provided. Follow-up for refill.       Paty Unger MD, MHS  [facility]  03/10/25

## 2025-02-26 RX ORDER — LISINOPRIL 2.5 MG/1
2.5 TABLET ORAL DAILY
Qty: 90 TABLET | Refills: 3 | Status: SHIPPED | OUTPATIENT
Start: 2025-02-26

## 2025-02-26 NOTE — TELEPHONE ENCOUNTER
Pt called the clinic to follow-up regarding refill request.    Per chart review, lisinopril was originally ordered by Nephrology however pt no longer follows with this provider and is hoping her PCP can take over prescribing.     She is out of medication, routing request HP to PCP for review. Pended as pt reports she is taking currently.     Please follow-up with pt after review by PCP.  Thank you,  Virginia Alvarez RN

## 2025-02-27 NOTE — TELEPHONE ENCOUNTER
Called pt, Relayed message below. Pt stated understanding and no further questions.     Estefanía Cardenas RN

## 2025-03-03 ENCOUNTER — MYC MEDICAL ADVICE (OUTPATIENT)
Dept: TRANSPLANT | Facility: CLINIC | Age: 45
End: 2025-03-03
Payer: COMMERCIAL

## 2025-03-03 DIAGNOSIS — R79.89 ELEVATED SERUM CREATININE: Primary | ICD-10-CM

## 2025-03-03 DIAGNOSIS — R11.2 NAUSEA AND VOMITING, UNSPECIFIED VOMITING TYPE: ICD-10-CM

## 2025-03-03 DIAGNOSIS — Z94.0 KIDNEY TRANSPLANT RECIPIENT: ICD-10-CM

## 2025-03-05 ENCOUNTER — TELEPHONE (OUTPATIENT)
Dept: TRANSPLANT | Facility: CLINIC | Age: 45
End: 2025-03-05

## 2025-03-05 ENCOUNTER — VIRTUAL VISIT (OUTPATIENT)
Dept: NEPHROLOGY | Facility: CLINIC | Age: 45
End: 2025-03-05
Payer: COMMERCIAL

## 2025-03-05 ENCOUNTER — LAB (OUTPATIENT)
Dept: LAB | Facility: CLINIC | Age: 45
End: 2025-03-05
Payer: COMMERCIAL

## 2025-03-05 VITALS
DIASTOLIC BLOOD PRESSURE: 79 MMHG | OXYGEN SATURATION: 99 % | WEIGHT: 127.8 LBS | SYSTOLIC BLOOD PRESSURE: 132 MMHG | BODY MASS INDEX: 18.34 KG/M2 | HEART RATE: 77 BPM

## 2025-03-05 DIAGNOSIS — R19.7 DIARRHEA, UNSPECIFIED TYPE: ICD-10-CM

## 2025-03-05 DIAGNOSIS — Z94.0 KIDNEY TRANSPLANT RECIPIENT: Primary | ICD-10-CM

## 2025-03-05 DIAGNOSIS — Z94.0 KIDNEY TRANSPLANT RECIPIENT: ICD-10-CM

## 2025-03-05 LAB
ALBUMIN SERPL BCG-MCNC: 4.4 G/DL (ref 3.5–5.2)
ALP SERPL-CCNC: 60 U/L (ref 40–150)
ALT SERPL W P-5'-P-CCNC: 14 U/L (ref 0–50)
ANION GAP SERPL CALCULATED.3IONS-SCNC: 16 MMOL/L (ref 7–15)
AST SERPL W P-5'-P-CCNC: 16 U/L (ref 0–45)
BILIRUB SERPL-MCNC: 0.5 MG/DL
BUN SERPL-MCNC: 16.7 MG/DL (ref 6–20)
CALCIUM SERPL-MCNC: 9.3 MG/DL (ref 8.8–10.4)
CHLORIDE SERPL-SCNC: 108 MMOL/L (ref 98–107)
CREAT SERPL-MCNC: 1.78 MG/DL (ref 0.51–0.95)
EGFRCR SERPLBLD CKD-EPI 2021: 35 ML/MIN/1.73M2
ERYTHROCYTE [DISTWIDTH] IN BLOOD BY AUTOMATED COUNT: 13.3 % (ref 10–15)
FASTING STATUS PATIENT QL REPORTED: YES
FASTING STATUS PATIENT QL REPORTED: YES
GLUCOSE SERPL-MCNC: 92 MG/DL (ref 70–99)
GLUCOSE SERPL-MCNC: 92 MG/DL (ref 70–99)
HCO3 SERPL-SCNC: 16 MMOL/L (ref 22–29)
HCT VFR BLD AUTO: 35.9 % (ref 35–47)
HGB BLD-MCNC: 11.6 G/DL (ref 11.7–15.7)
MAGNESIUM SERPL-MCNC: 1.6 MG/DL (ref 1.7–2.3)
MCH RBC QN AUTO: 28.6 PG (ref 26.5–33)
MCHC RBC AUTO-ENTMCNC: 32.3 G/DL (ref 31.5–36.5)
MCV RBC AUTO: 88 FL (ref 78–100)
PLATELET # BLD AUTO: 264 10E3/UL (ref 150–450)
POTASSIUM SERPL-SCNC: 4.4 MMOL/L (ref 3.4–5.3)
PROT SERPL-MCNC: 7 G/DL (ref 6.4–8.3)
RBC # BLD AUTO: 4.06 10E6/UL (ref 3.8–5.2)
SODIUM SERPL-SCNC: 140 MMOL/L (ref 135–145)
TACROLIMUS BLD-MCNC: 10 UG/L (ref 5–15)
TME LAST DOSE: NORMAL H
TME LAST DOSE: NORMAL H
WBC # BLD AUTO: 5.6 10E3/UL (ref 4–11)

## 2025-03-05 PROCEDURE — 82565 ASSAY OF CREATININE: CPT

## 2025-03-05 PROCEDURE — 80051 ELECTROLYTE PANEL: CPT

## 2025-03-05 PROCEDURE — 83735 ASSAY OF MAGNESIUM: CPT

## 2025-03-05 PROCEDURE — 85027 COMPLETE CBC AUTOMATED: CPT

## 2025-03-05 PROCEDURE — 36415 COLL VENOUS BLD VENIPUNCTURE: CPT

## 2025-03-05 PROCEDURE — 84460 ALANINE AMINO (ALT) (SGPT): CPT

## 2025-03-05 PROCEDURE — 80197 ASSAY OF TACROLIMUS: CPT

## 2025-03-05 RX ORDER — DIPHENHYDRAMINE HYDROCHLORIDE 50 MG/ML
25 INJECTION, SOLUTION INTRAMUSCULAR; INTRAVENOUS
Start: 2025-03-05

## 2025-03-05 RX ORDER — HEPARIN SODIUM,PORCINE 10 UNIT/ML
5-20 VIAL (ML) INTRAVENOUS DAILY PRN
OUTPATIENT
Start: 2025-03-05

## 2025-03-05 RX ORDER — EPINEPHRINE 1 MG/ML
0.3 INJECTION, SOLUTION, CONCENTRATE INTRAVENOUS EVERY 5 MIN PRN
OUTPATIENT
Start: 2025-03-05

## 2025-03-05 RX ORDER — METHYLPREDNISOLONE SODIUM SUCCINATE 40 MG/ML
40 INJECTION INTRAMUSCULAR; INTRAVENOUS
Start: 2025-03-05

## 2025-03-05 RX ORDER — ALBUTEROL SULFATE 90 UG/1
1-2 INHALANT RESPIRATORY (INHALATION)
Start: 2025-03-05

## 2025-03-05 RX ORDER — ALBUTEROL SULFATE 0.83 MG/ML
2.5 SOLUTION RESPIRATORY (INHALATION)
OUTPATIENT
Start: 2025-03-05

## 2025-03-05 RX ORDER — DIPHENHYDRAMINE HYDROCHLORIDE 50 MG/ML
50 INJECTION, SOLUTION INTRAMUSCULAR; INTRAVENOUS
Start: 2025-03-05

## 2025-03-05 RX ORDER — MEPERIDINE HYDROCHLORIDE 25 MG/ML
25 INJECTION INTRAMUSCULAR; INTRAVENOUS; SUBCUTANEOUS
OUTPATIENT
Start: 2025-03-05

## 2025-03-05 RX ORDER — HEPARIN SODIUM (PORCINE) LOCK FLUSH IV SOLN 100 UNIT/ML 100 UNIT/ML
5 SOLUTION INTRAVENOUS
OUTPATIENT
Start: 2025-03-05

## 2025-03-05 ASSESSMENT — PAIN SCALES - GENERAL: PAINLEVEL_OUTOF10: NO PAIN (0)

## 2025-03-05 NOTE — PROGRESS NOTES
Nephrology Clinic    Type of visit:  Video visit  Patient location: not at home  Provider location: off site      Daly Seaman MRN:[id number] YOB: 1980  Date of Service: 03/05/2025  Primary care provider: Samira Abad  Requesting physician: Samira Abad      REASON FOR CONSULT: CKD s/p LDKT    HISTORY OF PRESENT ILLNESS:   Daly Seaman is a 44 year old female who presents for evaluation of CKD s/p LDKT.  The past medical history is significant for IgA nephropathy diagnosed at age 23 treated with several course of steroids as well as CYC s/p preemptive LDKTx 4/2021 at [facility] from mother (1 haplotype match), s/p r-ATG induction. Baseline Cr-1/4-1.6. she had severe intolerance to steroids and was weaned off prednisone durnig the early few months post transplant and MPA dose was adjusted. She had some GI issues on MMF and was switched to MPA tid dosing and she follows dairy/gluten free diet. No hx of rejection, DSA, BK, infection, or malignancy. No known IgA recurrence. Her other medical problems include a large 8.1 cm uterine fibroid for which she is contemplating hysterectomy. The patient was last seen by her transplant nephrologist in November 2024 and had a that time a creatinine level at 1.37 mg/dL. However the level has been progressively increasing since then and the latest creatinine level is 1.78 mg/dL. This has occurred in the setting of severe norovirus infection with diarrhea diagnosed on 1/30/2025 and for which she received IVF on 2/8/25. She reports that her diarrhea never fully resolved and still occurs every 3 to 4 days and lasts for a full day. She has also lost 20 lbs. Lisinopril was put on hold for 3 weeks raven resumed after that and she is currently on 2.5 mg daily for BP control. Her tacrolimus level was last measured on 12/2/2024 and was then 5.2. The uACR was 8.13 mg/g Cr on 2/28/2025 and the uPCR was 0.11 mg/mg Cr. Renal imaging was last done as a CT scan on 2/4/2024 and  showed a normal appearance of the right lower quadrant kidney. The blood pressure in clinic is 132/79 and the patient reports that it has also been normal at home.     The following portions of the patient's history were reviewed and updated as appropriate: allergies, current medications, past family history, past medical history, past social history, past surgical history and problem list.    PAST MEDICAL HISTORY:  Past Medical History:   Diagnosis Date    History of blood transfusion 2021    History of colposcopy 2022    Hypertension     variable, based on health history    Thyroid disease     On watch, hypothyroid     PAST SURGICAL HISTORY:  Past Surgical History:   Procedure Laterality Date    ABDOMEN SURGERY  2021    IR THYROID BIOPSY  2020    IR THYROID BIOPSY  9/15/2015    TRANSPLANT  2012    kidney     MEDICATIONS:  Prescription Medications as of 3/5/2025         Rx Number Disp Refills Start End Last Dispensed Date Next Fill Date Owning Pharmacy    albuterol (PROAIR HFA/PROVENTIL HFA/VENTOLIN HFA) 108 (90 Base) MCG/ACT inhaler  -- -- 2021 --       Sig: Inhale 2 puffs into the lungs as needed.    Class: Historical    Route: Inhalation    aspirin 81 MG EC tablet  -- --  --       Sig: Take 81 mg by mouth daily    Class: Historical    Route: Oral    biotin 5 MG CAPS  -- --  --       Sig: Take 1 capsule by mouth    Class: Historical    Route: Oral    Cholecalciferol (VITAMIN D3) 50 MCG ( UT) CAPS  -- -- 2024 --       Sig: Take 1 capsule by mouth daily.    Class: Historical    Route: Oral    levonorgestrel (MIRENA) 52 MG (20 mcg/day) IUD  -- -- 2023 --       Si each by Intrauterine route    Class: Historical    Route: Intrauterine    lisinopril (ZESTRIL) 2.5 MG tablet  90 tablet 3 2025 --   [id number] [place], [place] - [address] AT [facility] [address]    Sig: Take 1 tablet (2.5 mg) by mouth daily.    Class: E-Prescribe     Route: Oral    Multiple Vitamins-Minerals (ONCOVITE) TABS  -- --  --       Sig: Take 1 tablet by mouth daily    Class: Historical    Route: Oral    mycophenolic acid (GENERIC EQUIVALENT) 180 MG EC tablet  150 tablet 11 3/5/2024 --   [facility], [place] - [address] [facility]    Sig: Take 360mg (2 tablets) in the morning, take 180mg (1 tablet) midday, and take 360mg (2 tablets) in the evening.    Class: E-Prescribe    Notes to Pharmacy: TXP DT 4/8/2021 (Kidney) TXP Dischg DT  DX Kidney replaced by transplant Z94.0 [facility] ([place], [place])    pantoprazole (PROTONIX) 40 MG EC tablet  90 tablet 3 9/26/2024 --   [id number] - [place], [place] - [address] AT [facility]    Sig: Take 1 tablet (40 mg) by mouth daily.    Class: E-Prescribe    Route: Oral    tacrolimus (GENERIC EQUIVALENT) 1 MG capsule  120 capsule 11 12/18/2024 --   [id number] - [place], [place] - [address] [facility]    Sig: Take 2 capsules (2 mg) by mouth 2 times daily.    Class: E-Prescribe    Notes to Pharmacy: TXP DT 4/8/2021 (Kidney) TXP Dischg DT   DX Kidney replaced by transplant Z94.0 [facility] ([place], [place])    Route: Oral    ferrous sulfate (FE TABS) 325 (65 Fe) MG EC tablet  -- --  --       Sig: Take 325 mg by mouth daily.    Class: Historical    Route: Oral    nitazoxanide (ALINIA) 500 MG tablet  12 tablet 0 2/7/2025 --   [id number] - [place], [place] - [address] [facility]    Sig: Take 1 tablet (500 mg) by mouth 2 times daily (with meals).    Class: E-Prescribe    Route: Oral           ALLERGIES:    Allergies   Allergen Reactions    Wasp Venom Protein Unknown     REVIEW OF SYSTEMS:    A comprehensive review of systems was performed and found to be negative except as described here or above.  SOCIAL HISTORY:   Social History     Socioeconomic History    Marital status:       Spouse name: Not on file    Number of children: Not on file    Years of education: Not on file    Highest education level: Not on file   Occupational History    Not on file   Tobacco Use    Smoking status: Never    Smokeless tobacco: Never   Vaping Use    Vaping status: Never Used   Substance and Sexual Activity    Alcohol use: Yes     Comment: occasional use.    Drug use: Never    Sexual activity: Yes     Partners: Male     Birth control/protection: I.U.D.   Other Topics Concern    Parent/sibling w/ CABG, MI or angioplasty before 65F 55M? No   Social History Narrative    Not on file     Social Drivers of Health     Financial Resource Strain: Low Risk  (11/14/2024)    Financial Resource Strain     Within the past 12 months, have you or your family members you live with been unable to get utilities (heat, electricity) when it was really needed?: No   Food Insecurity: Low Risk  (11/14/2024)    Food Insecurity     Within the past 12 months, did you worry that your food would run out before you got money to buy more?: No     Within the past 12 months, did the food you bought just not last and you didn t have money to get more?: No   Transportation Needs: Low Risk  (11/14/2024)    Transportation Needs     Within the past 12 months, has lack of transportation kept you from medical appointments, getting your medicines, non-medical meetings or appointments, work, or from getting things that you need?: No   Physical Activity: Unknown (11/14/2024)    Exercise Vital Sign     Days of Exercise per Week: 4 days     Minutes of Exercise per Session: Not on file   Stress: Stress Concern Present (11/14/2024)    [place] [employer] - Occupational Stress Questionnaire     Feeling of Stress : To some extent   Social Connections: Unknown (11/14/2024)    Social Connection and Isolation Panel [NHANES]     Frequency of Communication with Friends and Family: Not on file     Frequency of Social Gatherings with  Friends and Family: Never     Attends Orthodox Services: Not on file     Active Member of Clubs or Organizations: Not on file     Attends Club or Organization Meetings: Not on file     Marital Status: Not on file   Interpersonal Safety: Low Risk  (12/19/2024)    Interpersonal Safety     Do you feel physically and emotionally safe where you currently live?: Yes     Within the past 12 months, have you been hit, slapped, kicked or otherwise physically hurt by someone?: No     Within the past 12 months, have you been humiliated or emotionally abused in other ways by your partner or ex-partner?: No   Housing Stability: Low Risk  (11/14/2024)    Housing Stability     Do you have housing? : Yes     Are you worried about losing your housing?: No     FAMILY MEDICAL HISTORY:   Family History   Problem Relation Age of Onset    Genetic Disorder Mother         Osteogenesis imperfecta    Thyroid Disease Mother         hypo thyroid    Mental Illness Father     Genetic Disorder Brother         Osteogenesis imperfecta     PHYSICAL EXAM:   There were no vitals taken for this visit.  GENERAL APPEARANCE: alert and no distress  EYES: nonicteric  HENT: mouth without ulcers or lesions  NECK: supple, no adenopathy  RESP: lungs clear to auscultation   CV: regular rhythm, normal rate, no rub  ABDOMEN: soft, nontender, normal bowel sounds, no HSM   Extremities: no clubbing, cyanosis, or edema  MS: no evidence of inflammation in joints, no muscle tenderness  SKIN: no rash  NEURO: mentation intact and speech normal  PSYCH: affect normal/bright   LABS:   Recent Results (from the past 4 weeks)   Renal panel    Collection Time: 02/28/25  1:12 PM   Result Value Ref Range    Sodium 140 135 - 145 mmol/L    Potassium 4.8 3.4 - 5.3 mmol/L    Chloride 109 (H) 98 - 107 mmol/L    Carbon Dioxide (CO2) 21 (L) 22 - 29 mmol/L    Anion Gap 10 7 - 15 mmol/L    Glucose 98 70 - 99 mg/dL    Urea Nitrogen 24.9 (H) 6.0 - 20.0 mg/dL    Creatinine 1.83 (H) 0.51 -  0.95 mg/dL    GFR Estimate 34 (L) >60 mL/min/1.73m2    Calcium 9.4 8.8 - 10.4 mg/dL    Albumin 4.4 3.5 - 5.2 g/dL    Phosphorus 2.8 2.5 - 4.5 mg/dL   CBC with platelets    Collection Time: 02/28/25  1:12 PM   Result Value Ref Range    WBC Count 5.6 4.0 - 11.0 10e3/uL    RBC Count 3.86 3.80 - 5.20 10e6/uL    Hemoglobin 10.6 (L) 11.7 - 15.7 g/dL    Hematocrit 34.1 (L) 35.0 - 47.0 %    MCV 88 78 - 100 fL    MCH 27.5 26.5 - 33.0 pg    MCHC 31.1 (L) 31.5 - 36.5 g/dL    RDW 13.2 10.0 - 15.0 %    Platelet Count 236 150 - 450 10e3/uL   Vitamin D Deficiency    Collection Time: 02/28/25  1:12 PM   Result Value Ref Range    Vitamin D, Total (25-Hydroxy) 48 20 - 50 ng/mL   Ferritin    Collection Time: 02/28/25  1:12 PM   Result Value Ref Range    Ferritin 164 6 - 175 ng/mL   Parathyroid Hormone Intact    Collection Time: 02/28/25  1:12 PM   Result Value Ref Range    Parathyroid Hormone Intact 64 15 - 65 pg/mL   Iron & Iron Binding Capacity    Collection Time: 02/28/25  1:12 PM   Result Value Ref Range    Iron 93 37 - 145 ug/dL    Iron Binding Capacity 243 240 - 430 ug/dL    Iron Sat Index 38 15 - 46 %   UA with Microscopic    Collection Time: 02/28/25  1:15 PM   Result Value Ref Range    Color Urine Yellow Colorless, Straw, Light Yellow, Yellow    Appearance Urine Clear Clear    Glucose Urine Negative Negative mg/dL    Bilirubin Urine Negative Negative    Ketones Urine Negative Negative mg/dL    Specific Gravity Urine 1.025 1.003 - 1.035    Blood Urine Negative Negative    pH Urine 5.5 5.0 - 7.0    Protein Albumin Urine 30 (A) Negative mg/dL    Urobilinogen Urine 0.2 0.2, 1.0 E.U./dL    Nitrite Urine Negative Negative    Leukocyte Esterase Urine Negative Negative   Protein  random urine    Collection Time: 02/28/25  1:15 PM   Result Value Ref Range    Total Protein Urine mg/dL 21.5   mg/dL    Total Protein Urine mg/mg Creat 0.11 0.00 - 0.20 mg/mg Cr    Creatinine Urine mg/dL 198.0 mg/dL   Albumin Random Urine Quantitative with  Creat Ratio    Collection Time: 02/28/25  1:15 PM   Result Value Ref Range    Creatinine Urine mg/dL 198.0 mg/dL    Albumin Urine mg/L 16.1 mg/L    Albumin Urine mg/g Cr 8.13 0.00 - 25.00 mg/g Cr   Urine Microscopic Exam    Collection Time: 02/28/25  1:15 PM   Result Value Ref Range    Bacteria Urine Few (A) None Seen /HPF    RBC Urine 0-2 0-2 /HPF /HPF    WBC Urine 0-5 0-5 /HPF /HPF    Squamous Epithelials Urine Few (A) None Seen /LPF    Hyaline Casts Urine 2-5 (A) None Seen /LPF    Cellular Casts Urine 0-2 (A) None Seen /LPF     CMP  Recent Labs   Lab Test 02/28/25  1312 01/30/25  1550 12/02/24  0805 11/15/24  1242 02/10/24  0651 02/09/24  1306 02/09/24  1055 12/02/23  0904 10/07/23  0909 09/09/23  0757 08/12/23  0844    139 136 138   < > 139  --    < > 142 140 142   POTASSIUM 4.8 4.4 4.5 5.1   < > 4.4  --    < > 5.0 4.5 4.1   CHLORIDE 109* 108* 104 106   < > 106  --    < > 108* 106 106   CO2 21* 21* 22 24   < > 20*  --    < > 24 24 22   ANIONGAP 10 10 10 8   < > 13  --    < > 10 10 14   GLC 98 98 80 91   < > 102*  --    < > 97 92 84   BUN 24.9* 20.4* 22.8* 23.9*   < > 23.5*  --    < > 22.1* 24.0* 21.0*   CR 1.83* 1.74* 1.64* 1.37*   < > 2.00*  --    < > 1.55* 1.62* 1.76*   GFRESTIMATED 34* 36* 39* 49*   < > 31*  --    < > 42* 40* 36*   ROX 9.4 9.5 9.4 9.6   < > 9.1  --    < > 9.7 9.6 9.4   MAG  --   --   --   --   --   --  2.1  --  1.8 1.8 2.0   PHOS 2.8  --   --   --   --   --   --   --  3.1 3.2 3.1   PROTTOTAL  --   --   --   --   --  7.0  --   --   --   --   --    ALBUMIN 4.4  --   --   --   --  4.5  --   --   --   --   --    BILITOTAL  --   --   --   --   --  0.3  --   --   --   --   --    ALKPHOS  --   --   --   --   --  48  --   --   --   --   --    AST  --   --   --   --   --  23  --   --   --   --   --    ALT  --   --   --   --   --  12  --   --   --   --   --     < > = values in this interval not displayed.     CBC  Recent Labs   Lab Test 02/28/25  1312 01/30/25  1550 12/02/24  0805 11/15/24  1242    HGB 10.6* 12.0 11.9 11.3*   WBC 5.6 8.6 5.4 6.7   RBC 3.86 4.22 4.20 3.97   HCT 34.1* 36.9 38.1 35.9   MCV 88 87 91 90   MCH 27.5 28.4 28.3 28.5   MCHC 31.1* 32.5 31.2* 31.5   RDW 13.2 12.2 12.6 13.2    251 231 257     INRNo lab results found.  ABGNo lab results found.   URINE STUDIES  Recent Labs   Lab Test 02/28/25  1315 11/15/24  1246 02/09/24  1325 10/07/23  0913   COLOR Yellow Light Yellow Yellow Yellow   APPEARANCE Clear Clear Slightly Cloudy* Clear   URINEGLC Negative Negative Negative Negative   URINEBILI Negative Negative Small* Negative   URINEKETONE Negative Negative 20* Negative   SG 1.025 1.015 1.030 1.020   UBLD Negative Negative Trace* Negative   URINEPH 5.5 6.0 5.5 6.0   PROTEIN 30* Negative 100* Negative   UROBILINOGEN 0.2  --   --  0.2   NITRITE Negative Negative Negative Negative   LEUKEST Negative Negative Negative Negative   RBCU 0-2 1 1 2-5*   WBCU 0-5 <1 2 0-5     No lab results found.    ASSESSMENT AND PLAN:   #CKD s/p LDKT due to IgA nephropathy worsening likely due to recurrent PATRICIA secondary to diarrhea. Potential tacrolimus toxicity could also be a concern and I will obtain a repeat level. The absence of albuminuria and proteinuria makes it unlikely to have clinically significant IgA. Given the persistence of diarrhea, I will hold the lisinopril and I will reorder an enteric bacteria and viral panel and also rule out a C diff and CMV infection. Finally I will rule out any structural abnormality by obtaining a kidney ultrasound. The patient was instructed to keep a BP diary and to communicate the results    #HTN  Primary and secondary to CKD and tacrolimus use. Management as per above. On lisinopril 2.5 mg daily     #Blood count  WBC 5.6 Pursue MPA at current dose  Hemoglobin 11.6 acceptable    #Acid-base status  CO2 level 16 worsening and to a certain extent secondary to diarrhea.   I will start sodium bicarbonate supplementation    #Electrolytes  Na 140  K 4.4  Normal, no acute  issues    #BMD  Calcium 9.3    Phosphorus  2.8  Albumin 4.4  Vitamin D level 48 on cholecalciferol 2000 units daily      The total time of this encounter amounted to 60 minutes on the day of the encounter. This time included time spent with the patient, reviewing records, ordering tests, and performing post visit documentation.     The patient will return to follow up in     Lashawn Quintero MD  Division of Renal Disease and Hypertension  March 5, 2025  7:56 AM

## 2025-03-05 NOTE — TELEPHONE ENCOUNTER
ISSUE:   Tacrolimus IR level 10 on 03/05/25, goal 6, dose 2 mg BID.    PLAN:   Call Patient and confirm this was an accurate 12-hour trough.   Verify Tacrolimus IR dose 2 mg BID.   Confirm no new medications or illness.   Confirm no missed doses.     If accurate trough and accurate dose, decrease Tacrolimus IR dose to 1 mg BID  *Recommended dose rounded from calculated dose 1.2 mg  BID.      Repeat labs in 1 weeks.   For any dose change <50%, recheck labs per guideline or within 1 month.  For any dose change of more than 50%, recheck drug level based on timing to reach steady state:   Immediate release tacrolimus: 2-3 days  Extended-release tacrolimus: 7 days  Cyclosporine: 2 days  Sirolimus: 12 days  Everolimus: 8 days      OUTCOME:   See MyChart

## 2025-03-05 NOTE — TELEPHONE ENCOUNTER
Recommend IV sodium bicarbonate 150 meq in 1 l D5w x1  F/up stool studies, IgG, T cell subset  If diarrhea/GI issues persist, recommend reducing MPA to 360 mg po BID     IVF and labs ordered.

## 2025-03-06 ENCOUNTER — MYC MEDICAL ADVICE (OUTPATIENT)
Dept: TRANSPLANT | Facility: CLINIC | Age: 45
End: 2025-03-06
Payer: COMMERCIAL

## 2025-03-06 ENCOUNTER — TELEPHONE (OUTPATIENT)
Dept: TRANSPLANT | Facility: CLINIC | Age: 45
End: 2025-03-06
Payer: COMMERCIAL

## 2025-03-06 DIAGNOSIS — D84.9 IMMUNOSUPPRESSED STATUS: ICD-10-CM

## 2025-03-06 DIAGNOSIS — Z48.22 ENCOUNTER FOR AFTERCARE FOLLOWING KIDNEY TRANSPLANT: Primary | ICD-10-CM

## 2025-03-06 NOTE — TELEPHONE ENCOUNTER
Pt wonders if she can do her IV Infusions done at [facility]  closer for her to travel to

## 2025-03-07 ENCOUNTER — LAB (OUTPATIENT)
Dept: LAB | Facility: CLINIC | Age: 45
End: 2025-03-07
Payer: COMMERCIAL

## 2025-03-07 DIAGNOSIS — Z79.899 ENCOUNTER FOR LONG-TERM CURRENT USE OF MEDICATION: ICD-10-CM

## 2025-03-07 DIAGNOSIS — Z94.0 KIDNEY REPLACED BY TRANSPLANT: ICD-10-CM

## 2025-03-07 DIAGNOSIS — Z94.0 KIDNEY TRANSPLANT RECIPIENT: ICD-10-CM

## 2025-03-07 DIAGNOSIS — Z20.828 CONTACT WITH AND (SUSPECTED) EXPOSURE TO OTHER VIRAL COMMUNICABLE DISEASES: ICD-10-CM

## 2025-03-07 DIAGNOSIS — Z98.890 OTHER SPECIFIED POSTPROCEDURAL STATES: ICD-10-CM

## 2025-03-07 DIAGNOSIS — Z48.298 AFTERCARE FOLLOWING ORGAN TRANSPLANT: ICD-10-CM

## 2025-03-07 DIAGNOSIS — R19.7 DIARRHEA, UNSPECIFIED TYPE: ICD-10-CM

## 2025-03-07 LAB
ALBUMIN MFR UR ELPH: 28.6 MG/DL
ALBUMIN SERPL BCG-MCNC: 4.5 G/DL (ref 3.5–5.2)
ALP SERPL-CCNC: 49 U/L (ref 40–150)
ALT SERPL W P-5'-P-CCNC: 10 U/L (ref 0–50)
ANION GAP SERPL CALCULATED.3IONS-SCNC: 12 MMOL/L (ref 7–15)
AST SERPL W P-5'-P-CCNC: 16 U/L (ref 0–45)
BILIRUB SERPL-MCNC: 0.6 MG/DL
BUN SERPL-MCNC: 23.1 MG/DL (ref 6–20)
CALCIUM SERPL-MCNC: 9.7 MG/DL (ref 8.8–10.4)
CHLORIDE SERPL-SCNC: 107 MMOL/L (ref 98–107)
CMV DNA SPEC NAA+PROBE-ACNC: NOT DETECTED IU/ML
CREAT SERPL-MCNC: 1.78 MG/DL (ref 0.51–0.95)
CREAT UR-MCNC: 274 MG/DL
EGFRCR SERPLBLD CKD-EPI 2021: 35 ML/MIN/1.73M2
ERYTHROCYTE [DISTWIDTH] IN BLOOD BY AUTOMATED COUNT: 13.1 % (ref 10–15)
GLUCOSE SERPL-MCNC: 91 MG/DL (ref 70–99)
HCO3 SERPL-SCNC: 20 MMOL/L (ref 22–29)
HCT VFR BLD AUTO: 36 % (ref 35–47)
HGB BLD-MCNC: 11.3 G/DL (ref 11.7–15.7)
MCH RBC QN AUTO: 27.8 PG (ref 26.5–33)
MCHC RBC AUTO-ENTMCNC: 31.4 G/DL (ref 31.5–36.5)
MCV RBC AUTO: 89 FL (ref 78–100)
PLATELET # BLD AUTO: 250 10E3/UL (ref 150–450)
POTASSIUM SERPL-SCNC: 4.1 MMOL/L (ref 3.4–5.3)
PROT SERPL-MCNC: 6.9 G/DL (ref 6.4–8.3)
PROT/CREAT 24H UR: 0.1 MG/MG CR (ref 0–0.2)
RBC # BLD AUTO: 4.06 10E6/UL (ref 3.8–5.2)
SODIUM SERPL-SCNC: 139 MMOL/L (ref 135–145)
SPECIMEN TYPE: NORMAL
TACROLIMUS BLD-MCNC: 6.6 UG/L (ref 5–15)
TME LAST DOSE: NORMAL H
TME LAST DOSE: NORMAL H
WBC # BLD AUTO: 4.9 10E3/UL (ref 4–11)

## 2025-03-07 PROCEDURE — 80053 COMPREHEN METABOLIC PANEL: CPT

## 2025-03-07 PROCEDURE — 36415 COLL VENOUS BLD VENIPUNCTURE: CPT

## 2025-03-07 PROCEDURE — 80197 ASSAY OF TACROLIMUS: CPT

## 2025-03-07 PROCEDURE — 84156 ASSAY OF PROTEIN URINE: CPT

## 2025-03-07 PROCEDURE — 85027 COMPLETE CBC AUTOMATED: CPT

## 2025-03-08 ENCOUNTER — APPOINTMENT (OUTPATIENT)
Dept: LAB | Facility: CLINIC | Age: 45
End: 2025-03-08
Payer: COMMERCIAL

## 2025-03-08 LAB — C DIFF TOX B STL QL: NEGATIVE

## 2025-03-08 PROCEDURE — 87493 C DIFF AMPLIFIED PROBE: CPT

## 2025-03-08 PROCEDURE — 87507 IADNA-DNA/RNA PROBE TQ 12-25: CPT | Mod: 59

## 2025-03-09 ENCOUNTER — INFUSION THERAPY VISIT (OUTPATIENT)
Dept: INFUSION THERAPY | Facility: CLINIC | Age: 45
End: 2025-03-09
Attending: INTERNAL MEDICINE
Payer: COMMERCIAL

## 2025-03-09 ENCOUNTER — MYC REFILL (OUTPATIENT)
Dept: TRANSPLANT | Facility: CLINIC | Age: 45
End: 2025-03-09

## 2025-03-09 VITALS
RESPIRATION RATE: 16 BRPM | DIASTOLIC BLOOD PRESSURE: 81 MMHG | SYSTOLIC BLOOD PRESSURE: 136 MMHG | TEMPERATURE: 98 F | HEART RATE: 81 BPM | OXYGEN SATURATION: 100 %

## 2025-03-09 DIAGNOSIS — Z48.298 AFTERCARE FOLLOWING ORGAN TRANSPLANT: ICD-10-CM

## 2025-03-09 DIAGNOSIS — R11.2 NAUSEA AND VOMITING, UNSPECIFIED VOMITING TYPE: Primary | ICD-10-CM

## 2025-03-09 DIAGNOSIS — Z94.0 KIDNEY TRANSPLANT RECIPIENT: ICD-10-CM

## 2025-03-09 DIAGNOSIS — R79.89 ELEVATED SERUM CREATININE: ICD-10-CM

## 2025-03-09 LAB

## 2025-03-09 PROCEDURE — 96365 THER/PROPH/DIAG IV INF INIT: CPT

## 2025-03-09 PROCEDURE — 82784 ASSAY IGA/IGD/IGG/IGM EACH: CPT

## 2025-03-09 PROCEDURE — 258N000003 HC RX IP 258 OP 636: Performed by: INTERNAL MEDICINE

## 2025-03-09 PROCEDURE — 36415 COLL VENOUS BLD VENIPUNCTURE: CPT

## 2025-03-09 PROCEDURE — 96366 THER/PROPH/DIAG IV INF ADDON: CPT

## 2025-03-09 PROCEDURE — 86359 T CELLS TOTAL COUNT: CPT

## 2025-03-09 PROCEDURE — 250N000009 HC RX 250: Performed by: INTERNAL MEDICINE

## 2025-03-09 RX ORDER — HEPARIN SODIUM,PORCINE 10 UNIT/ML
5-20 VIAL (ML) INTRAVENOUS DAILY PRN
Status: CANCELLED | OUTPATIENT
Start: 2025-03-09

## 2025-03-09 RX ORDER — EPINEPHRINE 1 MG/ML
0.3 INJECTION, SOLUTION INTRAMUSCULAR; SUBCUTANEOUS EVERY 5 MIN PRN
Status: CANCELLED | OUTPATIENT
Start: 2025-03-09

## 2025-03-09 RX ORDER — DIPHENHYDRAMINE HYDROCHLORIDE 50 MG/ML
25 INJECTION, SOLUTION INTRAMUSCULAR; INTRAVENOUS
Status: CANCELLED
Start: 2025-03-09

## 2025-03-09 RX ORDER — ALBUTEROL SULFATE 0.83 MG/ML
2.5 SOLUTION RESPIRATORY (INHALATION)
Status: CANCELLED | OUTPATIENT
Start: 2025-03-09

## 2025-03-09 RX ORDER — MEPERIDINE HYDROCHLORIDE 25 MG/ML
25 INJECTION INTRAMUSCULAR; INTRAVENOUS; SUBCUTANEOUS
Status: CANCELLED | OUTPATIENT
Start: 2025-03-09

## 2025-03-09 RX ORDER — METHYLPREDNISOLONE SODIUM SUCCINATE 40 MG/ML
40 INJECTION INTRAMUSCULAR; INTRAVENOUS
Status: CANCELLED
Start: 2025-03-09

## 2025-03-09 RX ORDER — HEPARIN SODIUM (PORCINE) LOCK FLUSH IV SOLN 100 UNIT/ML 100 UNIT/ML
5 SOLUTION INTRAVENOUS
Status: CANCELLED | OUTPATIENT
Start: 2025-03-09

## 2025-03-09 RX ORDER — ALBUTEROL SULFATE 90 UG/1
1-2 INHALANT RESPIRATORY (INHALATION)
Status: CANCELLED
Start: 2025-03-09

## 2025-03-09 RX ORDER — DIPHENHYDRAMINE HYDROCHLORIDE 50 MG/ML
50 INJECTION, SOLUTION INTRAMUSCULAR; INTRAVENOUS
Status: CANCELLED
Start: 2025-03-09

## 2025-03-09 RX ADMIN — SODIUM BICARBONATE: 84 INJECTION, SOLUTION INTRAVENOUS at 09:43

## 2025-03-09 NOTE — PROGRESS NOTES
Nursing Note  Daly Seaman presents today to [facility] for:   Chief Complaint   Patient presents with    Infusion     IV Fluids/IV Bicarb     During today's [facility] appointment, orders from Dr GERA Dasilva were completed.  Frequency: once    Progress note:  Patient identification verified by name and date of birth.  Assessment completed.  Vitals recorded in Doc Flowsheets.  Patient was provided with education regarding medication/procedure and possible side effects.  Patient verbalized understanding.     present during visit today: Not Applicable.    Treatment Conditions: Non-applicable.    Premedications: were not ordered.    Drug Waste Record: No    Infusion length and rate:  infusion given over approximately  3 hours    Labs: were drawn per orders.    Vascular access: peripheral IV placed today.    Is the next appt scheduled? NA    Post Infusion Assessment:  Patient tolerated infusion without incident.  Blood return noted pre and post infusion.  Site patent and intact, free from redness, edema or discomfort.  No evidence of extravasations.  Access discontinued per protocol.     Discharge Plan:   Follow up plan of care with: transplant coordinator.  Discharge instructions were reviewed with patient.  Patient/representative verbalized understanding of discharge instructions and all questions answered.  Patient discharged from [facility] in stable condition.  Patient declined AVS.     Erika Pena RN    Administrations This Visit       D5W 1,000 mL with sodium bicarbonate 150 mEq/L infusion       Admin Date  03/09/2025 Action  $New Bag Dose   Rate  400 mL/hr Route  Intravenous Documented By  Erika Pena RN                    /81 (BP Location: Left arm, Patient Position: Semi-Emerson's, Cuff Size: Adult Regular)   Pulse 81   Temp 98  F (36.7  C) (Oral)   Resp 16   SpO2 100%

## 2025-03-10 ENCOUNTER — VIRTUAL VISIT (OUTPATIENT)
Dept: OBGYN | Facility: CLINIC | Age: 45
End: 2025-03-10
Payer: COMMERCIAL

## 2025-03-10 ENCOUNTER — PATIENT OUTREACH (OUTPATIENT)
Dept: ONCOLOGY | Facility: CLINIC | Age: 45
End: 2025-03-10

## 2025-03-10 DIAGNOSIS — Z94.0 KIDNEY REPLACED BY TRANSPLANT: ICD-10-CM

## 2025-03-10 DIAGNOSIS — N95.1 PERIMENOPAUSAL SYMPTOMS: ICD-10-CM

## 2025-03-10 DIAGNOSIS — Z94.0 KIDNEY REPLACED BY TRANSPLANT: Primary | ICD-10-CM

## 2025-03-10 DIAGNOSIS — D25.9 UTERINE LEIOMYOMA, UNSPECIFIED LOCATION: Primary | ICD-10-CM

## 2025-03-10 LAB
CD3 CELLS # BLD: 602 CELLS/UL (ref 603–2990)
CD3 CELLS NFR BLD: 60 % (ref 49–84)
CD3+CD4+ CELLS # BLD: 359 CELLS/UL (ref 441–2156)
CD3+CD4+ CELLS NFR BLD: 36 % (ref 28–63)
CD3+CD4+ CELLS/CD3+CD8+ CLL BLD: 1.66 % (ref 1.4–2.6)
CD3+CD8+ CELLS # BLD: 216 CELLS/UL (ref 125–1312)
CD3+CD8+ CELLS NFR BLD: 22 % (ref 10–40)
IGG SERPL-MCNC: 885 MG/DL (ref 610–1616)
T CELL COMMENT: ABNORMAL

## 2025-03-10 PROCEDURE — 98014 SYNCH AUDIO-ONLY EST MOD 30: CPT | Performed by: STUDENT IN AN ORGANIZED HEALTH CARE EDUCATION/TRAINING PROGRAM

## 2025-03-10 RX ORDER — MYCOPHENOLIC ACID 180 MG/1
TABLET, DELAYED RELEASE ORAL
Qty: 90 TABLET | Refills: 11 | Status: SHIPPED | OUTPATIENT
Start: 2025-03-10 | End: 2025-03-13

## 2025-03-10 RX ORDER — ESTRADIOL 0.03 MG/D
1 FILM, EXTENDED RELEASE TRANSDERMAL
Qty: 24 PATCH | Refills: 3 | Status: SHIPPED | OUTPATIENT
Start: 2025-03-10 | End: 2026-03-10

## 2025-03-10 NOTE — PROGRESS NOTES
"New Patient Hematology / Oncology Nurse Navigator Note     Referral Date: 3/10/25    Referring provider:   Paty Unger MD   [address] [facility] [address]   [facility] [address]   Phone: [phone]   Fax: [phone]       Referring Clinic/Organization: [facility]     Referred to: GynOnc    Requested provider (if applicable): First available - did not specify     Evaluation for : \"Enlarged fibroid uterus desring hysterectomy, history of renal transplant \"     Clinical History (per Nurse review of records provided):    Office Visit today with OBGYN:  \"Daly Seaman is a 44 year old with enlarged fibroid uterus. See previous notes regarding consultation and evaluation. She is s/p referral to IR. She would like to proceed with definitive management. Due to complex surgical history, advise consultation with gyn onc for further discussion and for availability of transplant team at [facility]. Discussed likely need for open surgery due to enlarged uterus. \"-- BOOKMARKED  12/3/24 Pelvic US:  Impression:   A simple cyst was noted in the left ovary, consistent with a physiological cys measuring 4cm. The right ovary is normal in appearance.   The uterus was enlarged due to large fundal fibroid measuring 8.1cm. Unchanged from previous exam. The endometrium appears normal.   An IUD is located within the uterine cavity. -- BOOKMARKED  1/21/25 PAP/HPV-- BOOKMARKED    Clinical Assessment / Barriers to Care (Per Nurse):  Pt lives in [place], [place]    Records Location: [facility]     Records Needed:   N/A    Additional testing needed prior to consult:   N/A    Referral updates and Plan:   Referral received, chart reviewed by nursing, scheduling instructions updated, patient contacted and scheduled with Dr. Lizarraga at [facility].     Mercy Cortes, ALVARON, RN, PHN, OCN  Hematology/Oncology Nurse Navigator  [facility]  [phone]    "

## 2025-03-13 DIAGNOSIS — Z48.298 AFTERCARE FOLLOWING ORGAN TRANSPLANT: ICD-10-CM

## 2025-03-13 RX ORDER — MYCOPHENOLIC ACID 180 MG/1
TABLET, DELAYED RELEASE ORAL
Qty: 90 TABLET | Refills: 11 | Status: SHIPPED | OUTPATIENT
Start: 2025-03-13

## 2025-03-13 NOTE — TELEPHONE ENCOUNTER
RECORDS STATUS - ALL OTHER DIAGNOSIS      RECORDS RECEIVED FROM:    DIAGNOSIS: Uterine leiomyoma, unspecified location [D25.9]  Kidney replaced by transplant [Z94.0]   NOTES STATUS DETAILS   OFFICE NOTE from referring provider Epic 03/10/25: Dr. Paty Chahal   [facility]   MEDICATION LIST [facility]    LABS     PATHOLOGY REPORTS Reports in Epic Gyn cyto:  01/21/25: [id number]  11/14/23: [id number]    Surg path:  01/17/24: [id number]   ANYTHING RELATED TO DIAGNOSIS Epic Most recent 03/09/25   IMAGING (NEED IMAGES & REPORT)     CT SCANS PACS 02/09/24: CT AP   MRI PACS 03/05/24: MR Pelvis   ULTRASOUND PACS 12/03/24, 02/09/24: US Pelvic

## 2025-03-17 ENCOUNTER — PRE VISIT (OUTPATIENT)
Dept: ONCOLOGY | Facility: CLINIC | Age: 45
End: 2025-03-17
Payer: COMMERCIAL

## 2025-03-17 ENCOUNTER — ONCOLOGY VISIT (OUTPATIENT)
Dept: ONCOLOGY | Facility: CLINIC | Age: 45
End: 2025-03-17
Attending: STUDENT IN AN ORGANIZED HEALTH CARE EDUCATION/TRAINING PROGRAM
Payer: COMMERCIAL

## 2025-03-17 VITALS
DIASTOLIC BLOOD PRESSURE: 78 MMHG | BODY MASS INDEX: 18.61 KG/M2 | HEIGHT: 70 IN | OXYGEN SATURATION: 100 % | HEART RATE: 74 BPM | SYSTOLIC BLOOD PRESSURE: 130 MMHG | TEMPERATURE: 97.6 F | RESPIRATION RATE: 18 BRPM | WEIGHT: 130 LBS

## 2025-03-17 DIAGNOSIS — D25.9 UTERINE LEIOMYOMA, UNSPECIFIED LOCATION: Primary | ICD-10-CM

## 2025-03-17 DIAGNOSIS — Z94.0 KIDNEY REPLACED BY TRANSPLANT: ICD-10-CM

## 2025-03-17 PROCEDURE — 99213 OFFICE O/P EST LOW 20 MIN: CPT | Performed by: STUDENT IN AN ORGANIZED HEALTH CARE EDUCATION/TRAINING PROGRAM

## 2025-03-17 PROCEDURE — 99205 OFFICE O/P NEW HI 60 MIN: CPT | Mod: GC | Performed by: STUDENT IN AN ORGANIZED HEALTH CARE EDUCATION/TRAINING PROGRAM

## 2025-03-17 ASSESSMENT — PAIN SCALES - GENERAL: PAINLEVEL_OUTOF10: NO PAIN (0)

## 2025-03-17 NOTE — LETTER
3/17/2025      Daly Seaman  [address] [place] [address]      Dear Colleague,    Thank you for referring your patient, Daly Seaman, to the [facility]. Please see a copy of my visit note below.    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 She has a known uterine fibroid, measuring up to 8 cm on last ultrasound in 2024. She reports having bulk symptoms, including discomfort with bowel movements, pelvic pressure, and increased dyspareunia. Also reports irregular vaginal bleeding, where now she gets a normal period, followed by light spotting two weeks later, which started over the last 2-3 years. Denies difficulty with voiding. In addition, reports a history of abnormal pap smears. Desires total hysterectomy due to discomfort and bleeding, as well as to discontinue need for pap smears in the setting of immunosuppression. Strongly desires minimally invasive surgery for aesthetics.     Disease History:  Known fibroid    12/3/24 - Pelvic US   Uterus - 8.1x7.8x5.9cm fundal subserosal fibroid; uterus 9.8x9.5x7.8cm; left ovary with physiologic 4cm cyst otherwise normal appearing ovaries. Endometrium measures 4.2 mm. An IUD is located within the uterine cavity.    Screening History  Colonoscopy: N/A  Mammogram: 25 - BIRADs 1  Cervical cancer screenin/2025: ASCUS, HPV neg  2024: Colposcopy, LSIL/CIN1  2023: LSIL/CIN1, HPV neg  DEXA: , wnl    OB/Gynecologic History:  Deliveries: , s/p vaginal delivery 13 years ago  Contraception:  Mirena IUD, has been using Mirena since delivery  Menopausal status: thinks that she's perimenopausal, reports mood changes, night sweats, exhaustion  History of HRT: Yes, started Vivelle-dot (0.025mg) within the last week for perimenopausal symptoms  Cervical cancer screening: See screening history above.     Past Medical History:   Diagnosis Date     History of blood transfusion April 2021     History of colposcopy 12/2022 1/2024     Hypertension     variable, based on health history     IgA nephropathy      Thyroid disease     On watch, hypothyroid       Past Surgical History:   Procedure Laterality Date     ABDOMEN SURGERY  April 2021     IR THYROID BIOPSY  2/4/2020     IR THYROID BIOPSY  9/15/2015     TRANSPLANT  April 2012    kidney   [facility] kidney transplant:  Reviewed operative report:  Living donor (mother) kidney transplant  RLQ incision retroperitoneal incision - two artery transplant (external iliac and inferior epigastric)     Social History     Tobacco Use     Smoking status: Never     Smokeless tobacco: Never   Vaping Use     Vaping status: Never Used   Substance Use Topics     Alcohol use: Yes     Comment: occasional use.     Drug use: Never   Current living situation: Lives with daughter, , and dog. Feels safe at home.   Works as the head of a health care  team.    Family History   Problem Relation Age of Onset     Genetic Disorder Mother         Osteogenesis imperfecta     Thyroid Disease Mother         hypo thyroid     Mental Illness Father      Genetic Disorder Brother         Osteogenesis imperfecta     Allergies  Allergies   Allergen Reactions     Wasp Venom Protein Unknown     Current Outpatient Medications   Medication Sig Dispense Refill     albuterol (PROAIR HFA/PROVENTIL HFA/VENTOLIN HFA) 108 (90 Base) MCG/ACT inhaler Inhale 2 puffs into the lungs as needed.       aspirin 81 MG EC tablet Take 81 mg by mouth daily       biotin 5 MG CAPS Take 1 capsule by mouth   "     Cholecalciferol (VITAMIN D3) 50 MCG (2000 UT) CAPS Take 1 capsule by mouth daily.       estradiol (VIVELLE-DOT) 0.025 MG/24HR bi-weekly patch Place 1 patch over 96 hours onto the skin twice a week. 24 patch 3     levonorgestrel (MIRENA) 52 MG (20 mcg/day) IUD 1 each by Intrauterine route       lisinopril (ZESTRIL) 2.5 MG tablet Take 1 tablet (2.5 mg) by mouth daily. 90 tablet 3     Multiple Vitamins-Minerals (ONCOVITE) TABS Take 1 tablet by mouth daily       mycophenolic acid (GENERIC EQUIVALENT) 180 MG EC tablet Take 360mg (2 tablets) in the morning, take 180mg (1 tablet) midday, and take 360mg (2 tablets) in the evening. 90 tablet 11     pantoprazole (PROTONIX) 40 MG EC tablet Take 1 tablet (40 mg) by mouth daily. 90 tablet 3     tacrolimus (GENERIC EQUIVALENT) 1 MG capsule Take 2 capsules (2 mg) by mouth 2 times daily. 120 capsule 11     No current facility-administered medications for this visit.     Takes Mycophenolic acid and Tacrolimus as well as recently started 0.025mg Vivelle dot    Physical Exam:   /78 (BP Location: Right arm, Patient Position: Sitting, Cuff Size: Adult Regular)   Pulse 74   Temp 97.6  F (36.4  C)   Resp 18   Ht 1.775 m (5' 9.88\")   Wt 59 kg (130 lb)   SpO2 100%   BMI 18.72 kg/m    Gen: Well-appearing, NAD  HEENT: Normocephalic, atraumatic  CV:  RRR, no m/r/g auscultated  Pulm: CTAB, no w/r/r auscultated  Abd: Soft, non-distended, mild tenderness to palpation of suprapubic area. No masses palpated  : External genitalia and urethra normal-appearing with no areas of hypopigmentation or raised area. No obvious excoriations, lesions, or rashes. No evidence of dry skin or erythema. Normal pink vaginal mucosa. Cervix pushed very anterior and difficult to visualize due to large round mass consistent with fibroid. Tips of IUD strings seen. Bimanual exam reveals palpable mass in the right adnexa presumed transplant kidney. Cervix is very anterior, soft and mobile. Uterus very " round firm but mobile mass consistent with fibroid.   Rectovaginal: deferred  Ext: No LE edema    Labs (3/14/2025): (personally reviewed today)  WBC 5.7. Hemoglobin 10.2.  Platelets 240.  Creatinine 1.55.  Potassium 4.3.  Remainder of electrolytes within normal limits.    Imaging: (personally reviewed imaging and results today)  Imaging reviewed as above    Assessment:  Daly Seaman is a 44 year old patient with a personal history of ESRD, in the setting of IgA nephropathy, now s/p living donor kidney transplant via retroperitoneal RLQ incision. She was referred to gyn oncology due to surgical history and complexity of case in the setting of fibroid uterus and desire for hysterectomy.    Discussed that we anticipate being able to perform a total laparoscopic hysterectomy and bilateral salpingectomy . Discussed possibly performing bilateral oophorectomy at the same time given patient is already perimenopausal and therefore prevent need for potential additional gynecologic surgery if cyst arises (for example, during workup for transplant in the future). We discussed that surgical menopause would be she likely will have worsening VMS but that we would plan increased dose of estrogen with already ordered Vivelle dot.. Will reach out to transplant team regarding feasibility of port placement and any further recommendations. Patient prefers scheduling procedure for late April, early May or late June, as she has a trips planned mid-June.    We reviewed the risks of surgery including risk of infection, bleeding, possible injury to surrounding structures or organs including bladder, ureters, bowel, blood vessels, or nerves. Risk of need for additional procedures or blood transfusion if complications. Discussed need for possible minilaparotomy for specimen removal however due to likely benign pathology will likely be able to try contained morcellation and transvaginal removal. Otherwise, will plan for a small pfannenstiel  "incision for specimen removal. We discussed very small likelihood of finding overt endometrial cancer, she has IUD in place, which treats endometrial cancer and there are no concerning signs for sarcoma.    Plan:   AUB-L  - case request placed for above procedure to be done at the [facility] for transplant availability  - plan preoperative assessment if greater than 30d from this visit  - pt has labs ordered for Friday and therefore will not order preoperative labs today, will plan for Friday draw review    Aida Montesinos MD  Ob/Gyn Resident, PGY-3  3/17/2025 12:29 PM     Physician Attestation   I saw this patient with the resident and agree with the findings and plan of care as documented in the note. I personally     Kalli Lizarraga MD  Gynecology Oncology   March 17, 2025                   Oncology Rooming Note    March 17, 2025 11:06 AM   Daly Seaman is a 44 year old female who presents for:    Chief Complaint   Patient presents with     Oncology Clinic Visit     Initial Vitals: /78 (BP Location: Right arm, Patient Position: Sitting, Cuff Size: Adult Regular)   Pulse 74   Temp 97.6  F (36.4  C)   Resp 18   Ht 1.775 m (5' 9.88\")   Wt 59 kg (130 lb)   SpO2 100%   BMI 18.72 kg/m   Estimated body mass index is 18.72 kg/m  as calculated from the following:    Height as of this encounter: 1.775 m (5' 9.88\").    Weight as of this encounter: 59 kg (130 lb). Body surface area is 1.71 meters squared.  No Pain (0) Comment: Data Unavailable   No LMP recorded. (Menstrual status: IUD).  Allergies reviewed: Yes  Medications reviewed: Yes    Medications: Medication refills not needed today.  Pharmacy name entered into FedBid: [id number] - [place], [place] - [address] AT [facility] [address]    Frailty Screening:   Is the patient here for a new oncology consult visit in cancer care? 2. No    PHQ9:  Did this patient require a PHQ9?: No      Clinical concerns: no        Lolis " ANUSHKA Malone                Again, thank you for allowing me to participate in the care of your patient.        Sincerely,        Kalli Lizarraga MD    Electronically signed

## 2025-03-17 NOTE — PROGRESS NOTES
"Oncology Rooming Note    March 17, 2025 11:06 AM   Daly Seaman is a 44 year old female who presents for:    Chief Complaint   Patient presents with    Oncology Clinic Visit     Initial Vitals: /78 (BP Location: Right arm, Patient Position: Sitting, Cuff Size: Adult Regular)   Pulse 74   Temp 97.6  F (36.4  C)   Resp 18   Ht 1.775 m (5' 9.88\")   Wt 59 kg (130 lb)   SpO2 100%   BMI 18.72 kg/m   Estimated body mass index is 18.72 kg/m  as calculated from the following:    Height as of this encounter: 1.775 m (5' 9.88\").    Weight as of this encounter: 59 kg (130 lb). Body surface area is 1.71 meters squared.  No Pain (0) Comment: Data Unavailable   No LMP recorded. (Menstrual status: IUD).  Allergies reviewed: Yes  Medications reviewed: Yes    Medications: Medication refills not needed today.  Pharmacy name entered into ShareP[employer]: [facility] #[id number] [place], [place] - [address] AT [facility] [address]    Frailty Screening:   Is the patient here for a new oncology consult visit in cancer care? 2. No    PHQ9:  Did this patient require a PHQ9?: No      Clinical concerns: no        Lolis Malone CMA              "

## 2025-03-17 NOTE — PROGRESS NOTES
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 She has a known uterine fibroid, measuring up to 8 cm on last ultrasound in 2024. She reports having bulk symptoms, including discomfort with bowel movements, pelvic pressure, and increased dyspareunia. Also reports irregular vaginal bleeding, where now she gets a normal period, followed by light spotting two weeks later, which started over the last 2-3 years. Denies difficulty with voiding. In addition, reports a history of abnormal pap smears. Desires total hysterectomy due to discomfort and bleeding, as well as to discontinue need for pap smears in the setting of immunosuppression. Strongly desires minimally invasive surgery for aesthetics.     Disease History:  Known fibroid    12/3/24 - Pelvic US   Uterus - 8.1x7.8x5.9cm fundal subserosal fibroid; uterus 9.8x9.5x7.8cm; left ovary with physiologic 4cm cyst otherwise normal appearing ovaries. Endometrium measures 4.2 mm. An IUD is located within the uterine cavity.    Screening History  Colonoscopy: N/A  Mammogram: 25 - BIRADs 1  Cervical cancer screenin/2025: ASCUS, HPV neg  2024: Colposcopy, LSIL/CIN1  2023: LSIL/CIN1, HPV neg  DEXA: , wnl    OB/Gynecologic History:  Deliveries: , s/p vaginal delivery 13 years ago  Contraception: Mirena IUD, has been using Mirena since delivery  Menopausal status: thinks that she's perimenopausal, reports mood changes, night sweats, exhaustion  History of HRT: Yes, started Vivelle-dot (0.025mg) within the last week for  perimenopausal symptoms  Cervical cancer screening: See screening history above.     Past Medical History:   Diagnosis Date    History of blood transfusion April 2021    History of colposcopy 12/2022 1/2024    Hypertension     variable, based on health history    IgA nephropathy     Thyroid disease     On watch, hypothyroid       Past Surgical History:   Procedure Laterality Date    ABDOMEN SURGERY  April 2021    IR THYROID BIOPSY  2/4/2020    IR THYROID BIOPSY  9/15/2015    TRANSPLANT  April 2012    kidney   [facility] kidney transplant:  Reviewed operative report:  Living donor (mother) kidney transplant  RLQ incision retroperitoneal incision - two artery transplant (external iliac and inferior epigastric)     Social History     Tobacco Use    Smoking status: Never    Smokeless tobacco: Never   Vaping Use    Vaping status: Never Used   Substance Use Topics    Alcohol use: Yes     Comment: occasional use.    Drug use: Never   Current living situation: Lives with daughter, , and dog. Feels safe at home.   Works as the head of a health care  team.    Family History   Problem Relation Age of Onset    Genetic Disorder Mother         Osteogenesis imperfecta    Thyroid Disease Mother         hypo thyroid    Mental Illness Father     Genetic Disorder Brother         Osteogenesis imperfecta     Allergies  Allergies   Allergen Reactions    Wasp Venom Protein Unknown     Current Outpatient Medications   Medication Sig Dispense Refill    albuterol (PROAIR HFA/PROVENTIL HFA/VENTOLIN HFA) 108 (90 Base) MCG/ACT inhaler Inhale 2 puffs into the lungs as needed.      aspirin 81 MG EC tablet Take 81 mg by mouth daily      biotin 5 MG CAPS Take 1 capsule by mouth      Cholecalciferol (VITAMIN D3) 50 MCG (2000 UT) CAPS Take 1 capsule by mouth daily.      estradiol (VIVELLE-DOT) 0.025 MG/24HR bi-weekly patch Place 1 patch over 96 hours onto the skin twice a week. 24 patch 3    levonorgestrel (MIRENA) 52 MG (20  "mcg/day) IUD 1 each by Intrauterine route      lisinopril (ZESTRIL) 2.5 MG tablet Take 1 tablet (2.5 mg) by mouth daily. 90 tablet 3    Multiple Vitamins-Minerals (ONCOVITE) TABS Take 1 tablet by mouth daily      mycophenolic acid (GENERIC EQUIVALENT) 180 MG EC tablet Take 360mg (2 tablets) in the morning, take 180mg (1 tablet) midday, and take 360mg (2 tablets) in the evening. 90 tablet 11    pantoprazole (PROTONIX) 40 MG EC tablet Take 1 tablet (40 mg) by mouth daily. 90 tablet 3    tacrolimus (GENERIC EQUIVALENT) 1 MG capsule Take 2 capsules (2 mg) by mouth 2 times daily. 120 capsule 11     No current facility-administered medications for this visit.     Takes Mycophenolic acid and Tacrolimus as well as recently started 0.025mg Vivelle dot    Physical Exam:   /78 (BP Location: Right arm, Patient Position: Sitting, Cuff Size: Adult Regular)   Pulse 74   Temp 97.6  F (36.4  C)   Resp 18   Ht 1.775 m (5' 9.88\")   Wt 59 kg (130 lb)   SpO2 100%   BMI 18.72 kg/m    Gen: Well-appearing, NAD  HEENT: Normocephalic, atraumatic  CV:  RRR, no m/r/g auscultated  Pulm: CTAB, no w/r/r auscultated  Abd: Soft, non-distended, mild tenderness to palpation of suprapubic area. No masses palpated  : External genitalia and urethra normal-appearing with no areas of hypopigmentation or raised area. No obvious excoriations, lesions, or rashes. No evidence of dry skin or erythema. Normal pink vaginal mucosa. Cervix pushed very anterior and difficult to visualize due to large round mass consistent with fibroid. Tips of IUD strings seen. Bimanual exam reveals palpable mass in the right adnexa presumed transplant kidney. Cervix is very anterior, soft and mobile. Uterus very round firm but mobile mass consistent with fibroid.   Rectovaginal: deferred  Ext: No LE edema    Labs (3/14/2025): (personally reviewed today)  WBC 5.7. Hemoglobin 10.2.  Platelets 240.  Creatinine 1.55.  Potassium 4.3.  Remainder of electrolytes within " normal limits.    Imaging: (personally reviewed imaging and results today)  Imaging reviewed as above    Assessment:  Daly Seaman is a 44 year old patient with a personal history of ESRD, in the setting of IgA nephropathy, now s/p living donor kidney transplant via retroperitoneal RLQ incision. She was referred to gyn oncology due to surgical history and complexity of case in the setting of fibroid uterus and desire for hysterectomy.    Discussed that we anticipate being able to perform a total laparoscopic hysterectomy and bilateral salpingectomy . Discussed possibly performing bilateral oophorectomy at the same time given patient is already perimenopausal and therefore prevent need for potential additional gynecologic surgery if cyst arises (for example, during workup for transplant in the future). We discussed that surgical menopause would be she likely will have worsening VMS but that we would plan increased dose of estrogen with already ordered Vivelle dot.. Will reach out to transplant team regarding feasibility of port placement and any further recommendations. Patient prefers scheduling procedure for late April, early May or late June, as she has a trips planned mid-June.    We reviewed the risks of surgery including risk of infection, bleeding, possible injury to surrounding structures or organs including bladder, ureters, bowel, blood vessels, or nerves. Risk of need for additional procedures or blood transfusion if complications. Discussed need for possible minilaparotomy for specimen removal however due to likely benign pathology will likely be able to try contained morcellation and transvaginal removal. Otherwise, will plan for a small pfannenstiel incision for specimen removal. We discussed very small likelihood of finding overt endometrial cancer, she has IUD in place, which treats endometrial cancer and there are no concerning signs for sarcoma.    Plan:   AUB-L  - case request placed for above  procedure to be done at the [facility] for transplant availability  - plan preoperative assessment if greater than 30d from this visit  - pt has labs ordered for Friday and therefore will not order preoperative labs today, will plan for Friday draw review    Aida Montesinos MD  Ob/Gyn Resident, PGY-3  3/17/2025 12:29 PM     Physician Attestation   I saw this patient with the resident and agree with the findings and plan of care as documented in the note. I personally     Kalli Lizarraga MD  Gynecology Oncology   March 17, 2025

## 2025-03-19 DIAGNOSIS — N93.9 ABNORMAL UTERINE BLEEDING: Primary | ICD-10-CM

## 2025-03-19 RX ORDER — HEPARIN SODIUM 10000 [USP'U]/ML
5000 INJECTION, SOLUTION INTRAVENOUS; SUBCUTANEOUS
OUTPATIENT
Start: 2025-03-19

## 2025-03-19 RX ORDER — METRONIDAZOLE 500 MG/100ML
500 INJECTION, SOLUTION INTRAVENOUS
OUTPATIENT
Start: 2025-03-19

## 2025-03-20 ENCOUNTER — TELEPHONE (OUTPATIENT)
Dept: ONCOLOGY | Facility: CLINIC | Age: 45
End: 2025-03-20
Payer: COMMERCIAL

## 2025-03-20 NOTE — TELEPHONE ENCOUNTER
Patient returned call, LVM asking for a call back to schedule with Dr. Lizarraga. Cris Colon on 3/20/2025 at 11:59 AM

## 2025-03-20 NOTE — TELEPHONE ENCOUNTER
Called Daly regarding sheduling surgery with Dr. Lizarraga.     Left voicemail with direct call back #: [phone].    Nae Carcamo on 3/20/2025 at 9:28 AM

## 2025-03-21 ENCOUNTER — PREP FOR PROCEDURE (OUTPATIENT)
Dept: TRANSPLANT | Facility: CLINIC | Age: 45
End: 2025-03-21

## 2025-03-22 ENCOUNTER — ANCILLARY PROCEDURE (OUTPATIENT)
Dept: ULTRASOUND IMAGING | Facility: CLINIC | Age: 45
End: 2025-03-22
Attending: INTERNAL MEDICINE
Payer: COMMERCIAL

## 2025-03-22 DIAGNOSIS — Z94.0 KIDNEY TRANSPLANT RECIPIENT: ICD-10-CM

## 2025-03-22 PROCEDURE — 76776 US EXAM K TRANSPL W/DOPPLER: CPT | Performed by: RADIOLOGY

## 2025-03-25 ENCOUNTER — DOCUMENTATION ONLY (OUTPATIENT)
Dept: TRANSPLANT | Facility: CLINIC | Age: 45
End: 2025-03-25
Payer: COMMERCIAL

## 2025-03-25 ASSESSMENT — ENCOUNTER SYMPTOMS: NEW SYMPTOMS OF CORONARY ARTERY DISEASE: 0

## 2025-03-25 NOTE — PROGRESS NOTES
Questions asked are standardized questions that the [employer] requires all transplant centers to report back to the [employer].  [employer] requests this information for all transplant recipients (kidney, liver, heart, lung, pancreas, etc).  [employer] utilizes this information to best understand recipient outcomes and equitable distribution of organs.       Patient status form completed.

## 2025-04-07 ENCOUNTER — TELEPHONE (OUTPATIENT)
Dept: TRANSPLANT | Facility: CLINIC | Age: 45
End: 2025-04-07
Payer: COMMERCIAL

## 2025-04-07 DIAGNOSIS — Z48.298 AFTERCARE FOLLOWING ORGAN TRANSPLANT: ICD-10-CM

## 2025-04-07 RX ORDER — MYCOPHENOLIC ACID 180 MG/1
TABLET, DELAYED RELEASE ORAL
Qty: 450 TABLET | Refills: 3 | Status: SHIPPED | OUTPATIENT
Start: 2025-04-07

## 2025-04-07 RX ORDER — MYCOPHENOLIC ACID 180 MG/1
TABLET, DELAYED RELEASE ORAL
Qty: 450 TABLET | Refills: 3 | Status: SHIPPED | OUTPATIENT
Start: 2025-04-07 | End: 2025-04-07

## 2025-04-11 PROBLEM — N87.0 DYSPLASIA OF CERVIX, LOW GRADE (CIN 1): Status: RESOLVED | Noted: 2023-11-15 | Resolved: 2025-04-11

## 2025-04-11 PROBLEM — R11.2 NAUSEA AND VOMITING, UNSPECIFIED VOMITING TYPE: Status: RESOLVED | Noted: 2024-02-09 | Resolved: 2025-04-11

## 2025-04-11 PROBLEM — Z94.0 KIDNEY REPLACED BY TRANSPLANT: Status: RESOLVED | Noted: 2023-11-14 | Resolved: 2025-04-11

## 2025-04-11 PROBLEM — Z00.00 ANNUAL PHYSICAL EXAM: Status: RESOLVED | Noted: 2023-11-14 | Resolved: 2025-04-11

## 2025-05-01 ENCOUNTER — ANESTHESIA EVENT (OUTPATIENT)
Dept: SURGERY | Facility: CLINIC | Age: 45
End: 2025-05-01
Payer: COMMERCIAL

## 2025-05-02 ENCOUNTER — ANESTHESIA (OUTPATIENT)
Dept: SURGERY | Facility: CLINIC | Age: 45
End: 2025-05-02
Payer: COMMERCIAL

## 2025-05-02 ENCOUNTER — HOSPITAL ENCOUNTER (OUTPATIENT)
Facility: CLINIC | Age: 45
Discharge: HOME | End: 2025-05-02
Attending: STUDENT IN AN ORGANIZED HEALTH CARE EDUCATION/TRAINING PROGRAM | Admitting: STUDENT IN AN ORGANIZED HEALTH CARE EDUCATION/TRAINING PROGRAM
Payer: COMMERCIAL

## 2025-05-02 VITALS
HEART RATE: 96 BPM | OXYGEN SATURATION: 99 % | HEIGHT: 71 IN | TEMPERATURE: 98 F | SYSTOLIC BLOOD PRESSURE: 112 MMHG | RESPIRATION RATE: 13 BRPM | WEIGHT: 129.19 LBS | BODY MASS INDEX: 18.09 KG/M2 | DIASTOLIC BLOOD PRESSURE: 61 MMHG

## 2025-05-02 DIAGNOSIS — N93.9 ABNORMAL UTERINE BLEEDING: ICD-10-CM

## 2025-05-02 LAB
ABO + RH BLD: NORMAL
ALBUMIN SERPL BCG-MCNC: 4.8 G/DL (ref 3.5–5.2)
ALP SERPL-CCNC: 60 U/L (ref 40–150)
ALT SERPL W P-5'-P-CCNC: 10 U/L (ref 0–50)
ANION GAP SERPL CALCULATED.3IONS-SCNC: 11 MMOL/L (ref 7–15)
AST SERPL W P-5'-P-CCNC: 17 U/L (ref 0–45)
BILIRUB SERPL-MCNC: 0.4 MG/DL
BLD GP AB SCN SERPL QL: NEGATIVE
BUN SERPL-MCNC: 28 MG/DL (ref 6–20)
CALCIUM SERPL-MCNC: 9.7 MG/DL (ref 8.8–10.4)
CHLORIDE SERPL-SCNC: 107 MMOL/L (ref 98–107)
CREAT SERPL-MCNC: 1.56 MG/DL (ref 0.51–0.95)
EGFRCR SERPLBLD CKD-EPI 2021: 42 ML/MIN/1.73M2
ERYTHROCYTE [DISTWIDTH] IN BLOOD BY AUTOMATED COUNT: 12.7 % (ref 10–15)
GLUCOSE SERPL-MCNC: 80 MG/DL (ref 70–99)
HCG UR QL: NEGATIVE
HCO3 SERPL-SCNC: 22 MMOL/L (ref 22–29)
HCT VFR BLD AUTO: 36.5 % (ref 35–47)
HGB BLD-MCNC: 11.4 G/DL (ref 11.7–15.7)
MCH RBC QN AUTO: 28.4 PG (ref 26.5–33)
MCHC RBC AUTO-ENTMCNC: 31.2 G/DL (ref 31.5–36.5)
MCV RBC AUTO: 91 FL (ref 78–100)
PLATELET # BLD AUTO: 242 10E3/UL (ref 150–450)
POTASSIUM SERPL-SCNC: 4.3 MMOL/L (ref 3.4–5.3)
PROT SERPL-MCNC: 7.5 G/DL (ref 6.4–8.3)
RBC # BLD AUTO: 4.02 10E6/UL (ref 3.8–5.2)
SODIUM SERPL-SCNC: 140 MMOL/L (ref 135–145)
SPECIMEN EXP DATE BLD: NORMAL
WBC # BLD AUTO: 5.6 10E3/UL (ref 4–11)

## 2025-05-02 PROCEDURE — 88300 SURGICAL PATH GROSS: CPT | Mod: 26 | Performed by: PATHOLOGY

## 2025-05-02 PROCEDURE — 81025 URINE PREGNANCY TEST: CPT | Performed by: STUDENT IN AN ORGANIZED HEALTH CARE EDUCATION/TRAINING PROGRAM

## 2025-05-02 PROCEDURE — 250N000011 HC RX IP 250 OP 636: Performed by: NURSE ANESTHETIST, CERTIFIED REGISTERED

## 2025-05-02 PROCEDURE — 250N000013 HC RX MED GY IP 250 OP 250 PS 637: Performed by: STUDENT IN AN ORGANIZED HEALTH CARE EDUCATION/TRAINING PROGRAM

## 2025-05-02 PROCEDURE — 250N000011 HC RX IP 250 OP 636: Performed by: STUDENT IN AN ORGANIZED HEALTH CARE EDUCATION/TRAINING PROGRAM

## 2025-05-02 PROCEDURE — 710N000012 HC RECOVERY PHASE 2, PER MINUTE: Performed by: STUDENT IN AN ORGANIZED HEALTH CARE EDUCATION/TRAINING PROGRAM

## 2025-05-02 PROCEDURE — 250N000024 HC ISOFLURANE, PER MIN: Performed by: STUDENT IN AN ORGANIZED HEALTH CARE EDUCATION/TRAINING PROGRAM

## 2025-05-02 PROCEDURE — 250N000009 HC RX 250: Performed by: NURSE ANESTHETIST, CERTIFIED REGISTERED

## 2025-05-02 PROCEDURE — 710N000009 HC RECOVERY PHASE 1, LEVEL 1, PER MIN: Performed by: STUDENT IN AN ORGANIZED HEALTH CARE EDUCATION/TRAINING PROGRAM

## 2025-05-02 PROCEDURE — 370N000017 HC ANESTHESIA TECHNICAL FEE, PER MIN: Performed by: STUDENT IN AN ORGANIZED HEALTH CARE EDUCATION/TRAINING PROGRAM

## 2025-05-02 PROCEDURE — 36415 COLL VENOUS BLD VENIPUNCTURE: CPT | Performed by: STUDENT IN AN ORGANIZED HEALTH CARE EDUCATION/TRAINING PROGRAM

## 2025-05-02 PROCEDURE — 360N000080 HC SURGERY LEVEL 7, PER MIN: Performed by: STUDENT IN AN ORGANIZED HEALTH CARE EDUCATION/TRAINING PROGRAM

## 2025-05-02 PROCEDURE — 86900 BLOOD TYPING SEROLOGIC ABO: CPT | Performed by: STUDENT IN AN ORGANIZED HEALTH CARE EDUCATION/TRAINING PROGRAM

## 2025-05-02 PROCEDURE — 88300 SURGICAL PATH GROSS: CPT | Mod: TC | Performed by: STUDENT IN AN ORGANIZED HEALTH CARE EDUCATION/TRAINING PROGRAM

## 2025-05-02 PROCEDURE — 999N000141 HC STATISTIC PRE-PROCEDURE NURSING ASSESSMENT: Performed by: STUDENT IN AN ORGANIZED HEALTH CARE EDUCATION/TRAINING PROGRAM

## 2025-05-02 PROCEDURE — 250N000013 HC RX MED GY IP 250 OP 250 PS 637

## 2025-05-02 PROCEDURE — 250N000011 HC RX IP 250 OP 636: Mod: JZ | Performed by: STUDENT IN AN ORGANIZED HEALTH CARE EDUCATION/TRAINING PROGRAM

## 2025-05-02 PROCEDURE — 258N000003 HC RX IP 258 OP 636: Performed by: NURSE ANESTHETIST, CERTIFIED REGISTERED

## 2025-05-02 PROCEDURE — 85014 HEMATOCRIT: CPT | Performed by: STUDENT IN AN ORGANIZED HEALTH CARE EDUCATION/TRAINING PROGRAM

## 2025-05-02 PROCEDURE — 88307 TISSUE EXAM BY PATHOLOGIST: CPT | Mod: 26 | Performed by: PATHOLOGY

## 2025-05-02 PROCEDURE — 84155 ASSAY OF PROTEIN SERUM: CPT | Performed by: STUDENT IN AN ORGANIZED HEALTH CARE EDUCATION/TRAINING PROGRAM

## 2025-05-02 PROCEDURE — 272N000001 HC OR GENERAL SUPPLY STERILE: Performed by: STUDENT IN AN ORGANIZED HEALTH CARE EDUCATION/TRAINING PROGRAM

## 2025-05-02 RX ORDER — ACETAMINOPHEN 325 MG/1
650 TABLET ORAL EVERY 6 HOURS PRN
Qty: 24 TABLET | Refills: 0 | Status: SHIPPED | OUTPATIENT
Start: 2025-05-02

## 2025-05-02 RX ORDER — BUPIVACAINE HYDROCHLORIDE 2.5 MG/ML
INJECTION, SOLUTION INFILTRATION; PERINEURAL PRN
Status: DISCONTINUED | OUTPATIENT
Start: 2025-05-02 | End: 2025-05-02 | Stop reason: HOSPADM

## 2025-05-02 RX ORDER — DEXAMETHASONE SODIUM PHOSPHATE 4 MG/ML
INJECTION, SOLUTION INTRA-ARTICULAR; INTRALESIONAL; INTRAMUSCULAR; INTRAVENOUS; SOFT TISSUE PRN
Status: DISCONTINUED | OUTPATIENT
Start: 2025-05-02 | End: 2025-05-02

## 2025-05-02 RX ORDER — LIDOCAINE HYDROCHLORIDE 20 MG/ML
INJECTION, SOLUTION INFILTRATION; PERINEURAL PRN
Status: DISCONTINUED | OUTPATIENT
Start: 2025-05-02 | End: 2025-05-02

## 2025-05-02 RX ORDER — PROPOFOL 10 MG/ML
INJECTION, EMULSION INTRAVENOUS CONTINUOUS PRN
Status: DISCONTINUED | OUTPATIENT
Start: 2025-05-02 | End: 2025-05-02

## 2025-05-02 RX ORDER — NALOXONE HYDROCHLORIDE 0.4 MG/ML
0.1 INJECTION, SOLUTION INTRAMUSCULAR; INTRAVENOUS; SUBCUTANEOUS
Status: DISCONTINUED | OUTPATIENT
Start: 2025-05-02 | End: 2025-05-03 | Stop reason: HOSPADM

## 2025-05-02 RX ORDER — ONDANSETRON 4 MG/1
4 TABLET, ORALLY DISINTEGRATING ORAL EVERY 30 MIN PRN
Status: DISCONTINUED | OUTPATIENT
Start: 2025-05-02 | End: 2025-05-02 | Stop reason: HOSPADM

## 2025-05-02 RX ORDER — FENTANYL CITRATE 50 UG/ML
50 INJECTION, SOLUTION INTRAMUSCULAR; INTRAVENOUS EVERY 5 MIN PRN
Status: DISCONTINUED | OUTPATIENT
Start: 2025-05-02 | End: 2025-05-02 | Stop reason: HOSPADM

## 2025-05-02 RX ORDER — METHOCARBAMOL 500 MG/1
500 TABLET, FILM COATED ORAL
Status: COMPLETED | OUTPATIENT
Start: 2025-05-02 | End: 2025-05-02

## 2025-05-02 RX ORDER — OXYCODONE HYDROCHLORIDE 5 MG/1
5 TABLET ORAL
Status: DISCONTINUED | OUTPATIENT
Start: 2025-05-02 | End: 2025-05-03 | Stop reason: HOSPADM

## 2025-05-02 RX ORDER — ONDANSETRON 2 MG/ML
4 INJECTION INTRAMUSCULAR; INTRAVENOUS EVERY 30 MIN PRN
Status: DISCONTINUED | OUTPATIENT
Start: 2025-05-02 | End: 2025-05-03 | Stop reason: HOSPADM

## 2025-05-02 RX ORDER — METRONIDAZOLE 500 MG/100ML
500 INJECTION, SOLUTION INTRAVENOUS
Status: COMPLETED | OUTPATIENT
Start: 2025-05-02 | End: 2025-05-02

## 2025-05-02 RX ORDER — METHOCARBAMOL 500 MG/1
500 TABLET, FILM COATED ORAL 3 TIMES DAILY PRN
Status: DISCONTINUED | OUTPATIENT
Start: 2025-05-02 | End: 2025-05-03 | Stop reason: HOSPADM

## 2025-05-02 RX ORDER — DEXAMETHASONE SODIUM PHOSPHATE 4 MG/ML
4 INJECTION, SOLUTION INTRA-ARTICULAR; INTRALESIONAL; INTRAMUSCULAR; INTRAVENOUS; SOFT TISSUE
Status: DISCONTINUED | OUTPATIENT
Start: 2025-05-02 | End: 2025-05-03 | Stop reason: HOSPADM

## 2025-05-02 RX ORDER — DEXAMETHASONE SODIUM PHOSPHATE 4 MG/ML
4 INJECTION, SOLUTION INTRA-ARTICULAR; INTRALESIONAL; INTRAMUSCULAR; INTRAVENOUS; SOFT TISSUE
Status: DISCONTINUED | OUTPATIENT
Start: 2025-05-02 | End: 2025-05-02 | Stop reason: HOSPADM

## 2025-05-02 RX ORDER — LABETALOL HYDROCHLORIDE 5 MG/ML
INJECTION, SOLUTION INTRAVENOUS PRN
Status: DISCONTINUED | OUTPATIENT
Start: 2025-05-02 | End: 2025-05-02

## 2025-05-02 RX ORDER — SIMETHICONE 80 MG
80 TABLET,CHEWABLE ORAL ONCE
Status: COMPLETED | OUTPATIENT
Start: 2025-05-02 | End: 2025-05-02

## 2025-05-02 RX ORDER — OXYCODONE HYDROCHLORIDE 10 MG/1
10 TABLET ORAL
Status: COMPLETED | OUTPATIENT
Start: 2025-05-02 | End: 2025-05-02

## 2025-05-02 RX ORDER — SIMETHICONE 80 MG
80 TABLET,CHEWABLE ORAL EVERY 6 HOURS PRN
Status: DISCONTINUED | OUTPATIENT
Start: 2025-05-02 | End: 2025-05-03 | Stop reason: HOSPADM

## 2025-05-02 RX ORDER — ACETAMINOPHEN 325 MG/1
975 TABLET ORAL ONCE
Status: DISCONTINUED | OUTPATIENT
Start: 2025-05-02 | End: 2025-05-03 | Stop reason: HOSPADM

## 2025-05-02 RX ORDER — FENTANYL CITRATE 50 UG/ML
25 INJECTION, SOLUTION INTRAMUSCULAR; INTRAVENOUS EVERY 5 MIN PRN
Status: DISCONTINUED | OUTPATIENT
Start: 2025-05-02 | End: 2025-05-02 | Stop reason: HOSPADM

## 2025-05-02 RX ORDER — PROPOFOL 10 MG/ML
INJECTION, EMULSION INTRAVENOUS PRN
Status: DISCONTINUED | OUTPATIENT
Start: 2025-05-02 | End: 2025-05-02

## 2025-05-02 RX ORDER — AMOXICILLIN 250 MG
1-2 CAPSULE ORAL 2 TIMES DAILY
Qty: 30 TABLET | Refills: 0 | Status: SHIPPED | OUTPATIENT
Start: 2025-05-02

## 2025-05-02 RX ORDER — HYDROMORPHONE HCL IN WATER/PF 6 MG/30 ML
0.4 PATIENT CONTROLLED ANALGESIA SYRINGE INTRAVENOUS EVERY 5 MIN PRN
Status: DISCONTINUED | OUTPATIENT
Start: 2025-05-02 | End: 2025-05-02 | Stop reason: HOSPADM

## 2025-05-02 RX ORDER — SODIUM CHLORIDE, SODIUM LACTATE, POTASSIUM CHLORIDE, CALCIUM CHLORIDE 600; 310; 30; 20 MG/100ML; MG/100ML; MG/100ML; MG/100ML
INJECTION, SOLUTION INTRAVENOUS CONTINUOUS
Status: DISCONTINUED | OUTPATIENT
Start: 2025-05-02 | End: 2025-05-02 | Stop reason: HOSPADM

## 2025-05-02 RX ORDER — CEFAZOLIN SODIUM/WATER 2 G/20 ML
2 SYRINGE (ML) INTRAVENOUS SEE ADMIN INSTRUCTIONS
Status: DISCONTINUED | OUTPATIENT
Start: 2025-05-02 | End: 2025-05-02 | Stop reason: HOSPADM

## 2025-05-02 RX ORDER — FENTANYL CITRATE 50 UG/ML
INJECTION, SOLUTION INTRAMUSCULAR; INTRAVENOUS PRN
Status: DISCONTINUED | OUTPATIENT
Start: 2025-05-02 | End: 2025-05-02

## 2025-05-02 RX ORDER — METHOCARBAMOL 500 MG/1
500 TABLET, FILM COATED ORAL 4 TIMES DAILY PRN
Qty: 5 TABLET | Refills: 0 | Status: SHIPPED | OUTPATIENT
Start: 2025-05-02

## 2025-05-02 RX ORDER — ONDANSETRON 2 MG/ML
4 INJECTION INTRAMUSCULAR; INTRAVENOUS EVERY 30 MIN PRN
Status: DISCONTINUED | OUTPATIENT
Start: 2025-05-02 | End: 2025-05-02 | Stop reason: HOSPADM

## 2025-05-02 RX ORDER — OXYCODONE HYDROCHLORIDE 5 MG/1
5 TABLET ORAL EVERY 6 HOURS PRN
Qty: 6 TABLET | Refills: 0 | Status: SHIPPED | OUTPATIENT
Start: 2025-05-02 | End: 2025-05-05

## 2025-05-02 RX ORDER — CEFAZOLIN SODIUM/WATER 2 G/20 ML
2 SYRINGE (ML) INTRAVENOUS
Status: COMPLETED | OUTPATIENT
Start: 2025-05-02 | End: 2025-05-02

## 2025-05-02 RX ORDER — ONDANSETRON 4 MG/1
4 TABLET, ORALLY DISINTEGRATING ORAL EVERY 30 MIN PRN
Status: DISCONTINUED | OUTPATIENT
Start: 2025-05-02 | End: 2025-05-03 | Stop reason: HOSPADM

## 2025-05-02 RX ORDER — HYDROMORPHONE HCL IN WATER/PF 6 MG/30 ML
0.2 PATIENT CONTROLLED ANALGESIA SYRINGE INTRAVENOUS EVERY 5 MIN PRN
Status: DISCONTINUED | OUTPATIENT
Start: 2025-05-02 | End: 2025-05-02 | Stop reason: HOSPADM

## 2025-05-02 RX ORDER — NALOXONE HYDROCHLORIDE 0.4 MG/ML
0.1 INJECTION, SOLUTION INTRAMUSCULAR; INTRAVENOUS; SUBCUTANEOUS
Status: DISCONTINUED | OUTPATIENT
Start: 2025-05-02 | End: 2025-05-02 | Stop reason: HOSPADM

## 2025-05-02 RX ORDER — HEPARIN SODIUM 5000 [USP'U]/.5ML
5000 INJECTION, SOLUTION INTRAVENOUS; SUBCUTANEOUS
Status: COMPLETED | OUTPATIENT
Start: 2025-05-02 | End: 2025-05-02

## 2025-05-02 RX ORDER — SODIUM CHLORIDE, SODIUM LACTATE, POTASSIUM CHLORIDE, CALCIUM CHLORIDE 600; 310; 30; 20 MG/100ML; MG/100ML; MG/100ML; MG/100ML
INJECTION, SOLUTION INTRAVENOUS CONTINUOUS PRN
Status: DISCONTINUED | OUTPATIENT
Start: 2025-05-02 | End: 2025-05-02

## 2025-05-02 RX ORDER — ONDANSETRON 2 MG/ML
INJECTION INTRAMUSCULAR; INTRAVENOUS PRN
Status: DISCONTINUED | OUTPATIENT
Start: 2025-05-02 | End: 2025-05-02

## 2025-05-02 RX ADMIN — PROPOFOL 50 MG: 10 INJECTION, EMULSION INTRAVENOUS at 12:32

## 2025-05-02 RX ADMIN — SODIUM CHLORIDE, SODIUM LACTATE, POTASSIUM CHLORIDE, AND CALCIUM CHLORIDE: .6; .31; .03; .02 INJECTION, SOLUTION INTRAVENOUS at 12:07

## 2025-05-02 RX ADMIN — PROPOFOL 150 MG: 10 INJECTION, EMULSION INTRAVENOUS at 12:31

## 2025-05-02 RX ADMIN — OXYCODONE HYDROCHLORIDE 10 MG: 10 TABLET ORAL at 19:20

## 2025-05-02 RX ADMIN — METHOCARBAMOL 500 MG: 500 TABLET ORAL at 20:43

## 2025-05-02 RX ADMIN — DEXAMETHASONE SODIUM PHOSPHATE 6 MG: 4 INJECTION, SOLUTION INTRAMUSCULAR; INTRAVENOUS at 12:45

## 2025-05-02 RX ADMIN — LABETALOL HYDROCHLORIDE 10 MG: 5 INJECTION INTRAVENOUS at 13:28

## 2025-05-02 RX ADMIN — LIDOCAINE HYDROCHLORIDE 60 MG: 20 INJECTION, SOLUTION INFILTRATION; PERINEURAL at 12:30

## 2025-05-02 RX ADMIN — FENTANYL CITRATE 50 MCG: 50 INJECTION, SOLUTION INTRAMUSCULAR; INTRAVENOUS at 16:00

## 2025-05-02 RX ADMIN — LABETALOL HYDROCHLORIDE 10 MG: 5 INJECTION INTRAVENOUS at 13:24

## 2025-05-02 RX ADMIN — FENTANYL CITRATE 50 MCG: 50 INJECTION INTRAMUSCULAR; INTRAVENOUS at 12:58

## 2025-05-02 RX ADMIN — HYDROMORPHONE HYDROCHLORIDE 0.2 MG: 0.2 INJECTION, SOLUTION INTRAMUSCULAR; INTRAVENOUS; SUBCUTANEOUS at 16:45

## 2025-05-02 RX ADMIN — Medication 20 MG: at 13:05

## 2025-05-02 RX ADMIN — LABETALOL HYDROCHLORIDE 5 MG: 5 INJECTION INTRAVENOUS at 13:19

## 2025-05-02 RX ADMIN — HEPARIN SODIUM 5000 UNITS: 5000 INJECTION, SOLUTION INTRAVENOUS; SUBCUTANEOUS at 10:07

## 2025-05-02 RX ADMIN — FENTANYL CITRATE 100 MCG: 50 INJECTION INTRAMUSCULAR; INTRAVENOUS at 13:07

## 2025-05-02 RX ADMIN — FENTANYL CITRATE 100 MCG: 50 INJECTION INTRAMUSCULAR; INTRAVENOUS at 12:30

## 2025-05-02 RX ADMIN — METRONIDAZOLE 500 MG: 500 INJECTION, SOLUTION INTRAVENOUS at 10:21

## 2025-05-02 RX ADMIN — Medication 60 MG: at 14:45

## 2025-05-02 RX ADMIN — Medication 50 MG: at 12:32

## 2025-05-02 RX ADMIN — FENTANYL CITRATE 100 MCG: 50 INJECTION INTRAMUSCULAR; INTRAVENOUS at 12:39

## 2025-05-02 RX ADMIN — ONDANSETRON 4 MG: 2 INJECTION INTRAMUSCULAR; INTRAVENOUS at 12:45

## 2025-05-02 RX ADMIN — MIDAZOLAM 2 MG: 1 INJECTION INTRAMUSCULAR; INTRAVENOUS at 12:05

## 2025-05-02 RX ADMIN — DEXMEDETOMIDINE HYDROCHLORIDE 0.6 MCG/KG/HR: 100 INJECTION, SOLUTION INTRAVENOUS at 12:55

## 2025-05-02 RX ADMIN — PROPOFOL 50 MCG/KG/MIN: 10 INJECTION, EMULSION INTRAVENOUS at 12:55

## 2025-05-02 RX ADMIN — SIMETHICONE 80 MG: 80 TABLET, CHEWABLE ORAL at 20:54

## 2025-05-02 RX ADMIN — Medication 2 G: at 12:35

## 2025-05-02 RX ADMIN — Medication 10 MG: at 13:54

## 2025-05-02 ASSESSMENT — ACTIVITIES OF DAILY LIVING (ADL)
ADLS_ACUITY_SCORE: 45

## 2025-05-02 NOTE — OP NOTE
[facility]  Full Operative Note    Date of Service:  5/2/2025     Surgeon: Kalli Lizarraga MD  Assistants: Hilda Soto MD PGY-3    Preop Dx: Uterine fibroids with secondary AUB-L, bulk symptoms  Postop Dx: Same s/p stated procedure  Procedure: Pelvic exam under anesthesia, robot assisted total laparoscopic hysterectomy, bilateral salpingectomy     Anesthesia: GETA  IVF: 900 mL crystalloid  EBL: 50 mL  UOP: 150 mL clear urine at the end of the case  Drains: None    Specimens:   ID Type Source Tests Collected by Time Destination   1 : intrauterine device Foreign Body Uterus SURGICAL PATHOLOGY EXAM Kalli Lizarraga MD 5/2/2025  1:29 PM     2 : Uterus, Cervix, Bilateral Fallopian Tubes Tissue Uterus, Cervix, Bilateral Fallopian Tubes SURGICAL PATHOLOGY EXAM Kalli Lizarraga MD 5/2/2025  2:00 PM      Complications: None apparent    Indications: Daly Seaman is a 45 yo female with uterine fibroids causing AUB and bulk symptoms that were unresponsive to medical management. Given size of fibroid uterus and history of prior renal transplantation, she was referred to Gynecology Oncology.  Given this, it was recommended she undergo Davinci assisted total laparoscopy hysterectomy and bilateral salpingectomy. Risks, benefits and alternatives to above stated procedure were discussed. Patient desired to proceed and written informed consent was obtained prior to proceeding.     Findings: Exam under anesthesia reveals small cervix with palpable IUD strings, small anteverted uterus with significant rotund fullness in posterior cul de sac corresponding to known fibroid- uterus 10 week sized. Transplanted kidney palpable on right on bimanual examination, no masses or fullness on left on bimanual examination. Slight hyperpigmentation of periurethral mucosa on the right- not raised and no other concerning features. No injury on laparoscopic entry. Normal upper abdominal survey including  normal liver edge, gallbladder and stomach. On pelvic suvey large ~10 cm subserosal fibroid extending from posterior uterine wall occuping entire posterior cul de sac. Normal fallopian tubes bilaterally. Small physiologic cyst on right ovary otherwise normal ovaries. Transplanted kidney visible in retroperitoneum on right- far from operative field. Surgical pedicles hemostatic at completion of the case. Small vaginal laceration present and repaired. No evidence of bladder injury on cystoscopy. Brisk efflux of urine from transplanted ureter on right bladder wall. Native ureteral orifices visualized. Intra-op gross pathology benign.     Procedure Details:   Consent was reviewed with the patient in the preoperative setting and confirmed. She received prophylactic antibiotics. The patient was transferred to the operating room where SCDs were placed. General anesthesia was obtained without noted difficulties and she was positioned in dorsal lithotomy position in what was felt to be a neurologically safe position with both arms tucked at sides. Exam under anesthesia was performed with findings as described above. The patient was then prepped and draped in the usual sterile fashion.     Timeout was called at which point the patient's name, procedure and operative site were confirmed by the operative team. A speculum was placed in the vagina. The anterior lip of the cervix was grasped. A V-Care uterine manipulator was  placed. The speculum was removed and a monet catheter placed.     Attention was then turned to the upper abdomen after gloves were changed. The umbilicus was everted with an Allis clamp and then grasped on each side with penetrating towel clamps. The Veress needle introduced through the umbilicus. The abdomen was insufflated with CO2 gas with an opening pressure of 1 mmHg.  Pneumoperitoneum was achieved to a maxiumum pressure of 15mm Hg. The Veress needle was removed. A vertical 8 mm incision was made above  the umbilicus and a Davinci port placed. The Davinci camera was then inserted and the abdomen evaluated with no evidence of intraabdominal trauma from entry noted. All additional ports were placed under direct visualization without any injuries noted. Three additional 8 mm Davinci ports were placed in the left upper, right upper and left lower abdomen with a minimum of 10 cm between them. An 8 mm Airseal assistant port was placed in the right lower abdomen with care to avoid the transplanted kidney. At this point, the patient was placed into steep Trendelenburg. The pelvis was inspected as well as the upper abdomen with findings as noted above.     The da Elda robot was docked and instruments were inserted including prograsper, fenestrated bipolar and monopolar scissors. Attention was then turned to the right round ligament, which was grasped and transected using monopolar scissors. The retroperitoneal space was opened. The native right ureter was identified far from the operative field. The transplant ureter was noted to vermiculate along the lateral sidewall and was well away from the surgical field. The right fallopian tube was then cauterized and transected from the fimbriated end to the cornua. The right utero-ovarian ligament was then cauterized and transected and the ovary was lateralize from the uterus. The remaining portions of the anterior and posterior leaves of the broad ligament were then opened and carefully dissected down to the level of the uterine artery. This dissection was continued medially to develop the bladder flap. The bladder was gently dissected off the uterus and cervix until it was clear of the planned area of colpotomy. The right uterine vasculature was then cauterized and lateralized. These steps were then performed on the left. The left round ligament was grasped and transected using monopolar scissors. The retroperitoneal space was opened. The native left ureter was identified far  from the operative field. The left fallopian tube was then cauterized and transected from the fimbriated end to the cornua. The left utero-ovarian ligament was then cauterized and transected and the ovary dropped away from the uterus.  The remaining portions of the anterior and posterior leaves of the broad ligament were then opened and carefully dissected down to the level of the uterine artery. The uterine vasculature on the left was cauterized, transected and lateralized.     The monopolar scissors were then used to incise the tissue to make the colpotomy circumferentially. This incision was extended around the cervix until the uterus was free of all its attachments. The uterus, cervix and fallopian tubes were then removed through the vagina.     A vaginal balloon was inserted to mantain adequate insufflation. The bladder was further dissected off the anterior vaginal cuff. The vaginal cuff was closed with running suture of 0-V Loc suture with good reapproximation noted. The vaginal cuff was hemostatic and the pelvis irrigated. Hemostasis of all surgical excision sites were noted.     Next, attention was turned to cystoscopy portion of case. The monet catheter was removed.  A 30-degree angled cystoscope was placed with findings as above. Cystoscope was used to drain bladder prior to removal. Vaginal balloon was removed from the vagina. Vaginal inspection revealed a small lacerations which was repaired with 2-0 Vicryl suture. The vaginal cuff was intact. All laparoscopic instruments and ports were then removed and the pneumoperitoneum was expelled. The patient was leveled and given positive pressure breaths.     Skin was reapproximated with  4-0 monocryl sutures and dermabond.     Sponge, instrument and needle counts were correct x 2. The patient tolerated the procedure well and was taken to PACU in stable condition. Dr. Lizarraga was present and scrubbed for the entire procedure.    Hilda Soto MD  Ob/Gyn  PGY-3  05/02/25 3:39 PM    Physician Attestation   I was present and scrubbed for the entire procedure and agree with the above operative note, which has been edited by me.    Kalli Lizarraga MD  Gynecology Oncology

## 2025-05-02 NOTE — BRIEF OP NOTE
[facility]    Brief Operative Note    Pre-operative diagnosis: Abnormal uterine bleeding [N93.9]  Post-operative diagnosis Same as pre-operative diagnosis    Procedure: Pelvic exam under anesthesia, robot-assisted total laparoscopic hysterectomy, bilateral salpingectomy, vaginal laceration repair, Bilateral - Abdomen  cystoscopy, N/A - Urethra    Surgeon: Surgeons and Role:     * Kalli Lizarraga MD - Primary     * Olivia De Santiago MD - Assisting     * Hilda Soto MD - Resident - Assisting  Anesthesia: General   Estimated Blood Loss: 50 mL  IVF: 900 mL  UO: 150 mL    Specimens:   ID Type Source Tests Collected by Time Destination   1 : intrauterine device Foreign Body Uterus SURGICAL PATHOLOGY EXAM Kalli Lizarraga MD 5/2/2025  1:29 PM    2 : Uterus, Cervix, Bilateral Fallopian Tubes Tissue Uterus, Cervix, Bilateral Fallopian Tubes SURGICAL PATHOLOGY EXAM Kalli Lizarraga MD 5/2/2025  2:00 PM      Findings:  EUA reveals small cervix. Small anteverted uterus with significant rotund fullness in posterior cul de sac corresponding to known fibroid. Transplanted kidney palpable in R retroperitoneum. No injury on laparoscopic entry. Normal upper abdominal survey including normal liver edge, gallbladder and stomach. On pelvic suvey large ~10 cm fibroid extending from posterior uterine wall occuping posterior cul de sac. Normal fallopian tubes bilaterally. Small physiologic cyst on right ovary otherwise normal ovaries. Transplanted kidney visible in retroperitoneum on right- far from operative field. Surgical pedicles hemostatic at completion of the case. Small vaginal laceration present and repaired. No evidence of injury on cystoscopy. Brisk efflux of urine from transplanted ureter on right bladder wall. Native ureteral orifices visualized.     Hilda Soto MD  Ob/Gyn PGY-3  05/02/25 2:59 PM

## 2025-05-02 NOTE — ANESTHESIA PREPROCEDURE EVALUATION
Anesthesia Pre-Procedure Evaluation    Patient: Daly Seaman   MRN: [id number] : 1980        Procedure : Procedure(s):  Pelvic exam under anesthesia, robot-assisted total laparoscopic hysterectomy, bilateral salpingectomy, possible open,  possible cystoscopy          Past Medical History:   Diagnosis Date    History of blood transfusion 2021    History of colposcopy 2022    Hypertension     variable, based on health history    IgA nephropathy     S/p kidney transplant    Thyroid disease     On watch, hypothyroid      Past Surgical History:   Procedure Laterality Date    ABDOMEN SURGERY  2021    IR THYROID BIOPSY  2020    IR THYROID BIOPSY  09/15/2015    TRANSPLANT  2012    kidney      Allergies   Allergen Reactions    Nsaids      transplant    Wasp Venom Protein Unknown      Social History     Tobacco Use    Smoking status: Never    Smokeless tobacco: Never   Substance Use Topics    Alcohol use: Yes     Comment: occasional use.      Wt Readings from Last 1 Encounters:   25 58.6 kg (129 lb 3 oz)        Anesthesia Evaluation            ROS/MED HX  ENT/Pulmonary:       Neurologic:       Cardiovascular:     (+)  hypertension-range: 130/80/ -   -  - -                                 Previous cardiac testing   Echo: Date: Results:  Normal biventricular size and systolic function (LVEF is 66%).     No significant valvular dysfunction.     Estimated pulmonary artery pressure is 27 mmHg (normal< 40 mmHg).     No pericardial effusion.     From   Stress Test:  Date: Results:    ECG Reviewed:  Date: Results:    Cath:  Date: Results:      METS/Exercise Tolerance: >4 METS    Hematologic:       Musculoskeletal:       GI/Hepatic:       Renal/Genitourinary:     (+)     Pt has history of transplant,         Endo:       Psychiatric/Substance Use:       Infectious Disease:       Malignancy:       Other:            Physical Exam    Airway        Mallampati: II   TM distance: >  "3 FB   Neck ROM: full   Mouth opening: > 3 cm    Respiratory Devices and Support         Dental           Cardiovascular          Rhythm and rate: regular and normal     Pulmonary           breath sounds clear to auscultation       Other findings: Crown mandibular molar right side    OUTSIDE LABS:  CBC:   Lab Results   Component Value Date    WBC 5.6 05/02/2025    WBC 5.4 04/11/2025    HGB 11.4 (L) 05/02/2025    HGB 11.2 (L) 04/11/2025    HCT 36.5 05/02/2025    HCT 37.1 04/11/2025     05/02/2025     04/11/2025     BMP:   Lab Results   Component Value Date     05/02/2025     04/11/2025    POTASSIUM 4.3 05/02/2025    POTASSIUM 4.2 04/11/2025    CHLORIDE 107 05/02/2025    CHLORIDE 105 04/11/2025    CO2 22 05/02/2025    CO2 24 04/11/2025    BUN 28.0 (H) 05/02/2025    BUN 20.0 04/11/2025    CR 1.56 (H) 05/02/2025    CR 1.55 (H) 04/11/2025    GLC 80 05/02/2025    GLC 99 04/11/2025     COAGS: No results found for: \"PTT\", \"INR\", \"FIBR\"  POC:   Lab Results   Component Value Date    HCG Negative 05/02/2025     HEPATIC:   Lab Results   Component Value Date    ALBUMIN 4.8 05/02/2025    PROTTOTAL 7.5 05/02/2025    ALT 10 05/02/2025    AST 17 05/02/2025    ALKPHOS 60 05/02/2025    BILITOTAL 0.4 05/02/2025     OTHER:   Lab Results   Component Value Date    ROX 9.7 05/02/2025    PHOS 2.8 02/28/2025    MAG 1.6 (L) 03/05/2025    LIPASE 62 (H) 02/09/2024    TSH 1.71 11/14/2024       Anesthesia Plan    ASA Status:  3    NPO Status:  NPO Appropriate    Anesthesia Type: General.     - Airway: ETT   Induction: Intravenous.   Maintenance: Balanced.   Techniques and Equipment:     - Lines/Monitors: 2nd IV     - Blood: T&S     Consents    Anesthesia Plan(s) and associated risks, benefits, and realistic alternatives discussed. Questions answered and patient/representative(s) expressed understanding.     - Discussed:     - Discussed with:  Patient      - Extended Intubation/Ventilatory Support Discussed: No.      - " Patient is DNR/DNI Status: No     Use of blood products discussed: Yes.     - Discussed with: Patient.     - Consented: consented to blood products     Postoperative Care    Pain management: Multi-modal analgesia.   PONV prophylaxis: Ondansetron (or other 5HT-3), Dexamethasone or Solumedrol, Background Propofol Infusion     Comments:               Romero Ken MD    Clinically Significant Risk Factors Present on Admission                 # Drug Induced Platelet Defect: home medication list includes an antiplatelet medication   # Hypertension: Home medication list includes antihypertensive(s)

## 2025-05-02 NOTE — PROGRESS NOTES
"Gynecology Oncology Progress Note  05/02/2025    POD#0 s/p CHARLES, BS    Subjective: Patient is doing well ***.  Pain is well controlled***.  ***Tolerating PO,voiding spontaneously***, and ***ambulating without issues. Denies chest pain, SOB, nausea/vomiting, fevers/chills, dizziness.    Objective:   Vitals:    05/02/25 0923   Pulse: 79   Resp: 18   Temp: 97.6  F (36.4  C)   TempSrc: Oral   Weight: 58.6 kg (129 lb 3 oz)   Height: 1.791 m (5' 10.5\")     General: NAD, appears comfortable in bed  CV: Regular rate, Well-perfused  Resp: Breathing comfortably on room air  Abdomen: Soft, appropriately tender, non-distended  Incision: c/d/i, overlying dermabond   Extremities: warm, well-perfused, nontender    Assessment: Daly Seaman is a 44 year old female who is POD#0 s/p CHARLES, BS in the setting of a fibroid uterus.     # Routine post-op  Dz: Fibroids (AUB-L, bulk sxs)   - Final pathology in process; will follow up with Dr Lizarraga in the outpatient setting   FEN: Regular diet  Pain: Scheduled Tylenol, PRN Oxycodone  Heme: EBL ***   CV: HTN- PTA Lisinopril to be resumed tomorrow   Pulm: Asthma- PTA Albuterol PRN   GI: Bowel reg, PRN antiemetics  : Kidney transplant 2/2 IgA nephropathy- PTA ASA, Mycophenolate, Tacrolimus. CKD (b/l Cr 1.4-1.5).   ID: s/p periop abx  Endo: Hypothyroid  Psych/Neuro/MSK: NI   PPX: SCDs. IS  Dispo: pending   Drains/Lines: PIV     Meeting goals for discharge to home     ***    To reach the GYNECOLOGIC ONCOLOGY resident pager: [phone].      "

## 2025-05-02 NOTE — ANESTHESIA CARE TRANSFER NOTE
Patient: Daly Seaman    Procedure: Procedure(s):  Pelvic exam under anesthesia, robot-assisted total laparoscopic hysterectomy, bilateral salpingectomy, vaginal laceration repair  cystoscopy       Diagnosis: Abnormal uterine bleeding [N93.9]  Diagnosis Additional Information: No value filed.    Anesthesia Type:   No value filed.     Note:    Oropharynx: oropharynx clear of all foreign objects  Level of Consciousness: awake  Oxygen Supplementation: nasal cannula  Level of Supplemental Oxygen (L/min / FiO2): 3  Independent Airway: airway patency satisfactory and stable  Dentition: dentition unchanged  Vital Signs Stable: post-procedure vital signs reviewed and stable  Report to RN Given: handoff report given  Patient transferred to: PACU    Handoff Report: Identifed the Patient, Identified the Reponsible Provider, Reviewed the pertinent medical history, Discussed the surgical course, Reviewed Intra-OP anesthesia mangement and issues during anesthesia, Set expectations for post-procedure period and Allowed opportunity for questions and acknowledgement of understanding      Vitals:  Vitals Value Taken Time   BP 96/60 05/02/25 1513      Temp     Pulse 67 05/02/25 1513   Resp 11 05/02/25 1513   SpO2 100 % 05/02/25 1513   Vitals shown include unfiled device data.    Electronically Signed By: RINKU Emmanuel CRNA  May 2, 2025  3:13 PM

## 2025-05-02 NOTE — ANESTHESIA POSTPROCEDURE EVALUATION
Patient: Daly Seaman    Procedure: Procedure(s):  Pelvic exam under anesthesia, robot-assisted total laparoscopic hysterectomy, bilateral salpingectomy, vaginal laceration repair  cystoscopy       Anesthesia Type:  No value filed.    Note:  Disposition: Outpatient   Postop Pain Control: Uneventful            Sign Out: Well controlled pain   PONV: No   Neuro/Psych: Uneventful            Sign Out: Acceptable/Baseline neuro status   Airway/Respiratory: Uneventful            Sign Out: Acceptable/Baseline resp. status   CV/Hemodynamics: Uneventful            Sign Out: Acceptable CV status; No obvious hypovolemia; No obvious fluid overload   Other NRE: NONE   DID A NON-ROUTINE EVENT OCCUR? No           Last vitals:  Vitals Value Taken Time   /77 05/02/25 1830   Temp 36.8  C (98.3  F) 05/02/25 1830   Pulse 82 05/02/25 1832   Resp 19 05/02/25 1832   SpO2 98 % 05/02/25 1832   Vitals shown include unfiled device data.    Electronically Signed By: Ellis Atkinson MD  May 2, 2025  6:33 PM

## 2025-05-02 NOTE — ANESTHESIA PROCEDURE NOTES
Airway       Patient location during procedure: OR       Procedure Start/Stop Times: 5/2/2025 12:34 PM  Staff -        Anesthesiologist:  Romero Ken MD       CRNA: Andria Beck APRN CRNA       Performed By: CRNA  Consent for Airway        Urgency: elective  Indications and Patient Condition       Indications for airway management: an-procedural       Induction type:intravenous       Mask difficulty assessment: 1 - vent by mask    Final Airway Details       Final airway type: endotracheal airway       Successful airway: ETT - single and Oral  Endotracheal Airway Details        ETT size (mm): 7.0       Cuffed: yes       Successful intubation technique: direct laryngoscopy       DL Blade Type: Arnold 2       Grade View of Cords: 1 (clear and open)       Adjucts: stylet       Position: Right       Measured from: lips       Secured at (cm): 22       Bite block used: None    Post intubation assessment        Placement verified by: capnometry, equal breath sounds and chest rise        Number of attempts at approach: 1       Number of other approaches attempted: 0       Secured with: tape       Ease of procedure: easy       Dentition: Intact and Unchanged    Medication(s) Administered   Medication Administration Time: 5/2/2025 12:34 PM

## 2025-05-02 NOTE — OR NURSING
Daly is pretty sleepy but vitally stable, pain is 3/10, denies nausea. Dr. Atkinson seen the patient and said she can go home.

## 2025-05-03 NOTE — PROGRESS NOTES
Gynecologic Oncology   Postoperative Check  5/2/2025    S:   Patient reports she is doing well postoperatively. Pain is much improved after addition of Robaxin to regimen while in PACU. Ambulating without dizziness. Voiding spontaneously. Tolerating crackers and water without nausea or vomiting. Bleeding isminimal. Denies chest pain, shortness of breath, dizziness, or other concerns at this time. Very eager for discharge home     O:  Vitals:    05/02/25 1900 05/02/25 1919 05/02/25 1920 05/02/25 2000   BP:  128/66     Cuff Size:  Adult Regular     Pulse: 96      Resp: 17 17     Temp:  98.1  F (36.7  C)     TempSrc:  Oral     SpO2: 100% 100% 99% 100%   Weight:       Height:           Gen: NAD  Cardio: RRR, S1/S2, no murmurs  Resp: CTAB, no wheezing or crackles  Abdomen: soft, appropriately tender, non distended, incisions c/d/i  Extremities: Non-tender, trace edema    A/P:   44 year old POD#0 s/p EUA, RA-TLH, and BS. Doing well postoperatively. Appropriate for discharge. Reviewed discharge instructions and precautions. She will follow-up post-op with Dr. Lizarraga.    Dz: Uterine fibroid   FEN: Advance as tolerated  Pain: Tylenol, Oxycodone, Robaxin PRN  GI: Stool softeners PRN  : Voiding spontaneously    Dispo: To home    Sy Aldrich MD   [employer]   Gynecologic Oncology, PGY-2  Gyn Onc Pager: [phone]

## 2025-05-03 NOTE — DISCHARGE INSTRUCTIONS
Contacting your Doctor -   To contact a doctor, call:  [phone] and ask for the resident on call for Gynecology (answered 24 hours a day)   Emergency Department:  [facility]: [phone]  [phone] if you are in need of immediate or emergent help

## 2025-05-12 ENCOUNTER — TELEPHONE (OUTPATIENT)
Dept: ONCOLOGY | Facility: CLINIC | Age: 45
End: 2025-05-12
Payer: COMMERCIAL

## 2025-05-12 NOTE — CONFIDENTIAL NOTE
Called pt with recommendations. Assisted her in making apt for 5/14 at  [facility] with [employer]. She verbalized understanding and is in agreement with the plan.

## 2025-05-12 NOTE — CONFIDENTIAL NOTE
S-(situation): pt reporting splitting a surgical stitch one week ago    B-(background): pt being seen for uterine leiomyoma, hysterectomy on 5/2    A-(assessment): Reports that she felt a stitch rip apart about one week ago,and that she has had some spotting on and off from it. Can tell that it is not vaginal bleeding. The sititch is distal to the vaginal opening. She says that area of the skin is not pushed together, and she is not sure how it will heal. Denies signs of infection or pain. Wonders if she should be seen prior to her post-op on 5/19    R-(recommendations): Will defer to provider.

## 2025-05-13 NOTE — PROGRESS NOTES
"Gynecologic Oncology Return Visit Note    Date: May 14, 2025     RE: Daly Seaman  : 1980  TEDDY: May 14, 2025     CC: Post op state    HPI:  Daly Seaman is a 44 year old woman who is s/p TLH BS 25.  She presents today for an acute visit.     Pertinent History:  History of kidney transplant due to IgA nephropathy   She was being followed by benign gyn for known uterine fibroid with bulk symptoms including discomfort with bowel movements, pelvic pressure, increased dyspareunia, and irregular vaginal bleeding.    12/3/24 - Pelvic US   Uterus - 8.1x7.8x5.9cm fundal subserosal fibroid; uterus 9.8x9.5x7.8cm; left ovary with physiologic 4cm cyst otherwise normal appearing ovaries. Endometrium measures 4.2 mm. An IUD is located within the uterine cavity.    Referral to gyn onc for consideration of hysterectomy.    25: Pelvic exam under anesthesia, robot assisted total laparoscopic hysterectomy, bilateral salpingectomy   A. Intrauterine device, removal:  - An IUD is identified  B. Uterus, Cervix, Bilateral Fallopian Tubes, hysterectomy with bilateral salpingectomy:  - Sparse inactive endometrium  - Leiomyoma (9.3 cm)  - Cervix with reactive epithelial changes and Nabothian cysts, negative for dysplasia  - Fallopian tubes with no significant histologic abnormality   - Negative for malignancy           Today she comes to clinic with concern for a \"popped stitch\".  She notes that the stitch seemed to pop a couple days after surgery.  She is worried that she will need the stitch replaced in order to facilitate healing.    -Vaginal bleeding/discharge: Some vaginal bleeding, some bleeding/spotting from the location of the lost stitch  -Abdominal pain/bloating: Some discomfort, not bad, not needing to take anything at this point  -Nausea or vomiting: No  -Appetite: Appetite is ok  -Bowel/bladder habits: Normal bowel/bladder habits  -Leg swelling: No  -Fevers: No  -Chest pain: No  -SOB: Some SANTIAGO, no SOB at " rest                  Past Medical History:    Past Medical History:   Diagnosis Date    History of blood transfusion April 2021    History of colposcopy 12/2022 1/2024    Hypertension     variable, based on health history    IgA nephropathy     S/p kidney transplant    Thyroid disease     On watch, hypothyroid         Past Surgical History:    Past Surgical History:   Procedure Laterality Date    ABDOMEN SURGERY  April 2021    CYSTOSCOPY N/A 5/2/2025    Procedure: cystoscopy;  Surgeon: Kalli Lizarraga MD;  Location: [facility] OR    DAVINCI HYSTERECTOMY TOTAL, BILATERAL SALPINGO-OOPHORECTOMY, NODE DISSECTION, COMBINED Bilateral 5/2/2025    Procedure: Pelvic exam under anesthesia, robot-assisted total laparoscopic hysterectomy, bilateral salpingectomy, vaginal laceration repair;  Surgeon: Kalli Lizarraga MD;  Location: [facility] OR    IR THYROID BIOPSY  02/04/2020    IR THYROID BIOPSY  09/15/2015    REMOVE INTRAUTERINE DEVICE N/A 5/2/2025    Procedure: Remove intrauterine device;  Surgeon: Kalli Lizarraga MD;  Location: [facility] OR    TRANSPLANT  April 2012    kidney         Health Maintenance Due   Topic Date Due    ZOSTER IMMUNIZATION (1 of 2) Never done    COVID-19 Vaccine (7 - Pfizer risk 2024-25 season) 04/01/2025       Current Medications:     Current Outpatient Medications   Medication Sig Dispense Refill    acetaminophen (TYLENOL) 325 MG tablet Take 2 tablets (650 mg) by mouth every 6 hours as needed for mild pain. 24 tablet 0    albuterol (PROAIR HFA/PROVENTIL HFA/VENTOLIN HFA) 108 (90 Base) MCG/ACT inhaler Inhale 2 puffs into the lungs as needed.      aspirin 81 MG EC tablet Take 81 mg by mouth daily      Cholecalciferol (VITAMIN D3) 50 MCG (2000 UT) CAPS Take 1 capsule by mouth daily.      estradiol (VIVELLE-DOT) 0.025 MG/24HR bi-weekly patch Place 1 patch over 96 hours onto the skin twice a week. 24 patch 3    lisinopril (ZESTRIL) 2.5 MG tablet Take 1 tablet (2.5 mg) by mouth daily. 90 tablet 3     methocarbamol (ROBAXIN) 500 MG tablet Take 1 tablet (500 mg) by mouth 4 times daily as needed for muscle spasms. 5 tablet 0    mycophenolic acid (GENERIC EQUIVALENT) 180 MG EC tablet Take 360mg (2 tablets) in the morning, take 180mg (1 tablet) midday, and take 360mg (2 tablets) in the evening. 450 tablet 3    pantoprazole (PROTONIX) 40 MG EC tablet Take 1 tablet (40 mg) by mouth daily. 90 tablet 3    senna-docusate (SENOKOT-S/PERICOLACE) 8.6-50 MG tablet Take 1-2 tablets by mouth 2 times daily. 30 tablet 0    tacrolimus (GENERIC EQUIVALENT) 1 MG capsule Take 2 capsules (2 mg) by mouth 2 times daily. 120 capsule 11         Allergies:        Allergies   Allergen Reactions    Nsaids      transplant    Wasp Venom Protein Unknown        Social History:     Social History     Tobacco Use    Smoking status: Never    Smokeless tobacco: Never   Substance Use Topics    Alcohol use: Yes     Comment: occasional use.       History   Drug Use Unknown         Family History:     The patient's family history is notable for:    Family History   Problem Relation Age of Onset    Genetic Disorder Mother         Osteogenesis imperfecta    Thyroid Disease Mother         hypo thyroid    Mental Illness Father     Genetic Disorder Brother         Osteogenesis imperfecta         Physical Exam:     BP (!) 142/82 (BP Location: Right arm, Patient Position: Sitting, Cuff Size: Adult Regular)   Pulse 73   Temp 98.6  F (37  C) (Oral)   Resp 16   Wt 57 kg (125 lb 9.6 oz)   LMP 04/01/2025 (Approximate)   SpO2 100%   BMI 17.77 kg/m    Body mass index is 17.77 kg/m .    General Appearance: alert, no distress     HEENT: no palpable nodules or masses        Cardiovascular: regular rate and rhythm, no gallops, rubs or murmurs     Respiratory: lungs clear, no rales, rhonchi or wheezes    Musculoskeletal: extremities non tender and without edema    Skin: no lesions or rashes     Neurological: normal gait, no gross  defects     Psychiatric: appropriate mood and affect                               Hematological: normal cervical and supraclavicular lymph nodes     Gastrointestinal:       abdomen soft, non-tender, non-distended; lap sites clean, dry, intact, no erythema, ecchymosis, or induration around lap sites    Genitourinary: External genitalia and urethral meatus appears normal, slight erythema at introitus with 2 mm x 1 mm x 1 mm open area at introitus 6 o'clock (presumed location of prior stitch from vaginal laceration).  Internal exam deferred.      No results found for this or any previous visit (from the past 24 hours).       Assessment:    Daly Seaman is a 44 year old woman who is s/p TLH BS 5/2/25.  She presents today for an acute visit.    15 minutes spent on the date of the encounter doing chart review, history and exam, documentation, and further activities as noted above.    No charge for visit.    Plan:     1.) Post operative state:  Vaginal laceration at introitus healing well.  Erythema appears to be from normal healing, no s/s of infection.  Area will heal from the base up, no need to restitch.   RTC as scheduled 5/19/25 for follow up with Dr. Lizarraga.  Reviewed signs and symptoms for when she should contact the clinic or seek additional care.  Patient to contact the clinic with any questions or concerns in the interim.     Labs and/or tests ordered include:  None.     2.) Health maintenance:  Issues addressed today include following up with PCP for annual health maintenance and non-gynecologic issues.       Amanda Vuong, DNP, APRN, FNP-C, AOCNP  Oncology Nurse Practitioner  Division of Gynecologic Oncology  Pager: [phone]     CC  Patient Care Team:  Samira Abad MD as PCP - General  Samira Abad MD as Assigned PCP  Paty Unger MD as Assigned OBGYN Provider  Lashawn Quintero MD as MD (Nephrology)  Lashawn Quintero MD as MD (Nephrology)  Quentin Guerra MD as Assigned Heart and  Vascular Provider  Kalli Lizarraga MD as Physician (Gynecologic Oncology)  Kalli Lizarraga MD as Assigned Cancer Care Provider  Lashawn Quintero MD as Assigned Nephrology Provider

## 2025-05-14 ENCOUNTER — ONCOLOGY VISIT (OUTPATIENT)
Dept: ONCOLOGY | Facility: CLINIC | Age: 45
End: 2025-05-14
Attending: NURSE PRACTITIONER
Payer: COMMERCIAL

## 2025-05-14 VITALS
TEMPERATURE: 98.6 F | BODY MASS INDEX: 17.77 KG/M2 | SYSTOLIC BLOOD PRESSURE: 142 MMHG | DIASTOLIC BLOOD PRESSURE: 82 MMHG | OXYGEN SATURATION: 100 % | WEIGHT: 125.6 LBS | HEART RATE: 73 BPM | RESPIRATION RATE: 16 BRPM

## 2025-05-14 DIAGNOSIS — Z98.890 POSTOPERATIVE STATE: Primary | ICD-10-CM

## 2025-05-14 PROCEDURE — 99213 OFFICE O/P EST LOW 20 MIN: CPT | Performed by: NURSE PRACTITIONER

## 2025-05-14 PROCEDURE — 99024 POSTOP FOLLOW-UP VISIT: CPT | Performed by: NURSE PRACTITIONER

## 2025-05-14 ASSESSMENT — PAIN SCALES - GENERAL: PAINLEVEL_OUTOF10: NO PAIN (0)

## 2025-05-14 NOTE — NURSING NOTE
"Oncology Rooming Note    May 14, 2025 11:22 AM   Daly Seaman is a 44 year old female who presents for:    Chief Complaint   Patient presents with    Oncology Clinic Visit     Uterine fibroid     Initial Vitals: BP (!) 142/82 (BP Location: Right arm, Patient Position: Sitting, Cuff Size: Adult Regular)   Pulse 73   Temp 98.6  F (37  C) (Oral)   Resp 16   Wt 57 kg (125 lb 9.6 oz)   LMP 04/01/2025 (Approximate)   SpO2 100%   BMI 17.77 kg/m   Estimated body mass index is 17.77 kg/m  as calculated from the following:    Height as of 5/2/25: 1.791 m (5' 10.5\").    Weight as of this encounter: 57 kg (125 lb 9.6 oz). Body surface area is 1.68 meters squared.  No Pain (0) Comment: Data Unavailable   Patient's last menstrual period was 04/01/2025 (approximate).  Allergies reviewed: Yes  Medications reviewed: Yes    Medications: Medication refills not needed today.  Pharmacy name entered into [employer]): [id number] - [place], [place] - [address] AT [facility] [address]    Frailty Screening:   Is the patient here for a new oncology consult visit in cancer care? 2. No      Clinical concerns: Pt reports she felt dizzy when she got up from her seat in lobby. Amanda was notified via message.       Rohini Doshi, EMT     "

## 2025-05-14 NOTE — LETTER
"2025      Daly Seaman  [address] [place] [address]      Dear Colleague,    Thank you for referring your patient, Daly Seaman, to the [facility]. Please see a copy of my visit note below.    Gynecologic Oncology Return Visit Note    Date: May 14, 2025     RE: Daly Seaman  : 1980  TEDDY: May 14, 2025     CC: Post op state    HPI:  Daly Seaman is a 44 year old woman who is s/p TLH BS 25.  She presents today for an acute visit.     Pertinent History:  History of kidney transplant due to IgA nephropathy   She was being followed by benign gyn for known uterine fibroid with bulk symptoms including discomfort with bowel movements, pelvic pressure, increased dyspareunia, and irregular vaginal bleeding.    12/3/24 - Pelvic US   Uterus - 8.1x7.8x5.9cm fundal subserosal fibroid; uterus 9.8x9.5x7.8cm; left ovary with physiologic 4cm cyst otherwise normal appearing ovaries. Endometrium measures 4.2 mm. An IUD is located within the uterine cavity.    Referral to gyn onc for consideration of hysterectomy.    25: Pelvic exam under anesthesia, robot assisted total laparoscopic hysterectomy, bilateral salpingectomy   A. Intrauterine device, removal:  - An IUD is identified  B. Uterus, Cervix, Bilateral Fallopian Tubes, hysterectomy with bilateral salpingectomy:  - Sparse inactive endometrium  - Leiomyoma (9.3 cm)  - Cervix with reactive epithelial changes and Nabothian cysts, negative for dysplasia  - Fallopian tubes with no significant histologic abnormality   - Negative for malignancy           Today she comes to clinic with concern for a \"popped stitch\".  She notes that the stitch seemed to pop a couple days after surgery.  She is worried that she will need the stitch replaced in order to facilitate healing.    -Vaginal bleeding/discharge: Some vaginal bleeding, some bleeding/spotting from the location of the lost stitch  -Abdominal pain/bloating: Some " discomfort, not bad, not needing to take anything at this point  -Nausea or vomiting: No  -Appetite: Appetite is ok  -Bowel/bladder habits: Normal bowel/bladder habits  -Leg swelling: No  -Fevers: No  -Chest pain: No  -SOB: Some SANTIAGO, no SOB at rest                  Past Medical History:    Past Medical History:   Diagnosis Date     History of blood transfusion April 2021     History of colposcopy 12/2022 1/2024     Hypertension     variable, based on health history     IgA nephropathy     S/p kidney transplant     Thyroid disease     On watch, hypothyroid         Past Surgical History:    Past Surgical History:   Procedure Laterality Date     ABDOMEN SURGERY  April 2021     CYSTOSCOPY N/A 5/2/2025    Procedure: cystoscopy;  Surgeon: Kalli Lizarraga MD;  Location: [facility] OR     DAVINCI HYSTERECTOMY TOTAL, BILATERAL SALPINGO-OOPHORECTOMY, NODE DISSECTION, COMBINED Bilateral 5/2/2025    Procedure: Pelvic exam under anesthesia, robot-assisted total laparoscopic hysterectomy, bilateral salpingectomy, vaginal laceration repair;  Surgeon: Kalli Lizarraga MD;  Location: [facility] OR     IR THYROID BIOPSY  02/04/2020     IR THYROID BIOPSY  09/15/2015     REMOVE INTRAUTERINE DEVICE N/A 5/2/2025    Procedure: Remove intrauterine device;  Surgeon: Kalli Lizarraga MD;  Location: [facility] OR     TRANSPLANT  April 2012    kidney         Health Maintenance Due   Topic Date Due     ZOSTER IMMUNIZATION (1 of 2) Never done     COVID-19 Vaccine (7 - Pfizer risk 2024-25 season) 04/01/2025       Current Medications:     Current Outpatient Medications   Medication Sig Dispense Refill     acetaminophen (TYLENOL) 325 MG tablet Take 2 tablets (650 mg) by mouth every 6 hours as needed for mild pain. 24 tablet 0     albuterol (PROAIR HFA/PROVENTIL HFA/VENTOLIN HFA) 108 (90 Base) MCG/ACT inhaler Inhale 2 puffs into the lungs as needed.       aspirin 81 MG EC tablet Take 81 mg by mouth daily       Cholecalciferol (VITAMIN D3) 50 MCG  (2000 UT) CAPS Take 1 capsule by mouth daily.       estradiol (VIVELLE-DOT) 0.025 MG/24HR bi-weekly patch Place 1 patch over 96 hours onto the skin twice a week. 24 patch 3     lisinopril (ZESTRIL) 2.5 MG tablet Take 1 tablet (2.5 mg) by mouth daily. 90 tablet 3     methocarbamol (ROBAXIN) 500 MG tablet Take 1 tablet (500 mg) by mouth 4 times daily as needed for muscle spasms. 5 tablet 0     mycophenolic acid (GENERIC EQUIVALENT) 180 MG EC tablet Take 360mg (2 tablets) in the morning, take 180mg (1 tablet) midday, and take 360mg (2 tablets) in the evening. 450 tablet 3     pantoprazole (PROTONIX) 40 MG EC tablet Take 1 tablet (40 mg) by mouth daily. 90 tablet 3     senna-docusate (SENOKOT-S/PERICOLACE) 8.6-50 MG tablet Take 1-2 tablets by mouth 2 times daily. 30 tablet 0     tacrolimus (GENERIC EQUIVALENT) 1 MG capsule Take 2 capsules (2 mg) by mouth 2 times daily. 120 capsule 11         Allergies:        Allergies   Allergen Reactions     Nsaids      transplant     Wasp Venom Protein Unknown        Social History:     Social History     Tobacco Use     Smoking status: Never     Smokeless tobacco: Never   Substance Use Topics     Alcohol use: Yes     Comment: occasional use.       History   Drug Use Unknown         Family History:     The patient's family history is notable for:    Family History   Problem Relation Age of Onset     Genetic Disorder Mother         Osteogenesis imperfecta     Thyroid Disease Mother         hypo thyroid     Mental Illness Father      Genetic Disorder Brother         Osteogenesis imperfecta         Physical Exam:     BP (!) 142/82 (BP Location: Right arm, Patient Position: Sitting, Cuff Size: Adult Regular)   Pulse 73   Temp 98.6  F (37  C) (Oral)   Resp 16   Wt 57 kg (125 lb 9.6 oz)   LMP 04/01/2025 (Approximate)   SpO2 100%   BMI 17.77 kg/m    Body mass index is 17.77 kg/m .    General Appearance: alert, no distress     HEENT: no palpable nodules or masses         Cardiovascular: regular rate and rhythm, no gallops, rubs or murmurs     Respiratory: lungs clear, no rales, rhonchi or wheezes    Musculoskeletal: extremities non tender and without edema    Skin: no lesions or rashes     Neurological: normal gait, no gross defects     Psychiatric: appropriate mood and affect                               Hematological: normal cervical and supraclavicular lymph nodes     Gastrointestinal:       abdomen soft, non-tender, non-distended; lap sites clean, dry, intact, no erythema, ecchymosis, or induration around lap sites    Genitourinary: External genitalia and urethral meatus appears normal, slight erythema at introitus with 2 mm x 1 mm x 1 mm open area at introitus 6 o'clock (presumed location of prior stitch from vaginal laceration).  Internal exam deferred.      No results found for this or any previous visit (from the past 24 hours).       Assessment:    Daly Seaman is a 44 year old woman who is s/p [facility] BS 5/2/25.  She presents today for an acute visit.    15 minutes spent on the date of the encounter doing chart review, history and exam, documentation, and further activities as noted above.    No charge for visit.    Plan:     1.) Post operative state:  Vaginal laceration at introitus healing well.  Erythema appears to be from normal healing, no s/s of infection.  Area will heal from the base up, no need to restitch.   RTC as scheduled 5/19/25 for follow up with Dr. Lizarraga.  Reviewed signs and symptoms for when she should contact the clinic or seek additional care.  Patient to contact the clinic with any questions or concerns in the interim.     Labs and/or tests ordered include:  None.     2.) Health maintenance:  Issues addressed today include following up with PCP for annual health maintenance and non-gynecologic issues.       Amanda Vuong, DNP, APRN, FNP-C, AOCNP  Oncology Nurse Practitioner  Division of Gynecologic Oncology  Pager: [phone]      CC  Patient Care Team:  Samira Abad MD as PCP - General  Samira Abad MD as Assigned PCP  Paty Unger MD as Assigned OBGYN Provider  Lashawn Quintero MD as MD (Nephrology)  Lashawn Quintero MD as MD (Nephrology)  Quentin Guerra MD as Assigned Heart and Vascular Provider  Kalli Lizarraga MD as Physician (Gynecologic Oncology)  Kalli Lizraraga MD as Assigned Cancer Care Provider  Lashawn Quintero MD as Assigned Nephrology Provider      Again, thank you for allowing me to participate in the care of your patient.        Sincerely,        RINKU Degroot CNP    Electronically signed

## 2025-05-16 NOTE — PROGRESS NOTES
Gynecologic Oncology Clinic - Return Patient    Referring provider:    Paty Unger MD  [address] [address]    Patient: Daly Seaman  : 1980    Date of Visit: May 19, 2025     Reason for visit: fibroid uterus, AUB    History of Present Illness:  Daly Seaman is a 44 year old  patient with a personal history of kidney transplant due to IgA nephropathy initially referred for hysterectomy in the setting of renal transplant. She is now s/p ALVARO SOTO for benign pathology on     She reports that she's doing quite well, getting antsy without exercise. She notes that the gas pain from insufflation was the worst after surgery, which lasted a good 4-5days, but she stopped taking Tylenol after the first few days, and has had no real ongoing issues with pain. Her RLQ port site is sometimes sort, but otherwise her incisions feel fine. She is having minimal spotting and that seems to be improving. Voiding without issue - less urgency than before, constipation initially but this has since improved.     Disease History:  Known fibroid    12/3/24 - Pelvic US   Uterus - 8.1x7.8x5.9cm fundal subserosal fibroid; uterus 9.8x9.5x7.8cm; left ovary with physiologic 4cm cyst otherwise normal appearing ovaries. Endometrium measures 4.2 mm. An IUD is located within the uterine cavity.    25 - s/p EUA, RA-TLH, BS  Pathology: 9.3cm leiomyoma, cervix negative for dysplasia, fallopian tubes wnl    Screening History  Colonoscopy: N/A  Mammogram: 25 - BIRADs 1  Cervical cancer screenin/2025: ASCUS, HPV neg  2024: Colposcopy, LSIL/CIN1  2023: LSIL/CIN1, HPV neg  DEXA: , wnl    OB/Gynecologic History:  Deliveries: , s/p vaginal delivery 13 years ago  Contraception: now s/p hyst  Menopausal status: thinks that she's perimenopausal, reports mood changes, night sweats, exhaustion  History of HRT: Yes, started Vivelle-dot (0.025mg) within the last week for  perimenopausal symptoms. Discussed vaginal estrogen could be considered as well for Genitourinary symptoms down the road.  Cervical cancer screening: See screening history above.     Past Medical History:   Diagnosis Date    History of blood transfusion April 2021    History of colposcopy 12/2022 1/2024    Hypertension     variable, based on health history    IgA nephropathy     S/p kidney transplant    Thyroid disease     On watch, hypothyroid       Past Surgical History:   Procedure Laterality Date    ABDOMEN SURGERY  April 2021    CYSTOSCOPY N/A 5/2/2025    Procedure: cystoscopy;  Surgeon: Kalli Lizarraga MD;  Location: [facility] OR    DAVINCI HYSTERECTOMY TOTAL, BILATERAL SALPINGO-OOPHORECTOMY, NODE DISSECTION, COMBINED Bilateral 5/2/2025    Procedure: Pelvic exam under anesthesia, robot-assisted total laparoscopic hysterectomy, bilateral salpingectomy, vaginal laceration repair;  Surgeon: Kalli Lizarraga MD;  Location: [facility] OR    IR THYROID BIOPSY  02/04/2020    IR THYROID BIOPSY  09/15/2015    REMOVE INTRAUTERINE DEVICE N/A 5/2/2025    Procedure: Remove intrauterine device;  Surgeon: Kalli Lizarraga MD;  Location: [facility] OR    TRANSPLANT  April 2012    kidney   [facility] kidney transplant:  Reviewed operative report:  Living donor (mother) kidney transplant  RLQ incision retroperitoneal incision - two artery transplant (external iliac and inferior epigastric)     Social History     Tobacco Use    Smoking status: Never    Smokeless tobacco: Never   Vaping Use    Vaping status: Never Used   Substance Use Topics    Alcohol use: Yes     Comment: occasional use.    Drug use: Never   Current living situation: Lives with daughter, , and dog. Feels safe at home.   Works as the head of a health care  team.    Family History   Problem Relation Age of Onset    Genetic Disorder Mother         Osteogenesis imperfecta    Thyroid Disease Mother         hypo thyroid    Mental Illness  Father     Genetic Disorder Brother         Osteogenesis imperfecta     Allergies  Allergies   Allergen Reactions    Nsaids      transplant    Wasp Venom Protein Unknown     Current Outpatient Medications   Medication Sig Dispense Refill    albuterol (PROAIR HFA/PROVENTIL HFA/VENTOLIN HFA) 108 (90 Base) MCG/ACT inhaler Inhale 2 puffs into the lungs as needed.      aspirin 81 MG EC tablet Take 81 mg by mouth daily      Cholecalciferol (VITAMIN D3) 50 MCG (2000 UT) CAPS Take 1 capsule by mouth daily.      estradiol (VIVELLE-DOT) 0.025 MG/24HR bi-weekly patch Place 1 patch over 96 hours onto the skin twice a week. 24 patch 3    lisinopril (ZESTRIL) 2.5 MG tablet Take 1 tablet (2.5 mg) by mouth daily. 90 tablet 3    mycophenolic acid (GENERIC EQUIVALENT) 180 MG EC tablet Take 360mg (2 tablets) in the morning, take 180mg (1 tablet) midday, and take 360mg (2 tablets) in the evening. 450 tablet 3    pantoprazole (PROTONIX) 40 MG EC tablet Take 1 tablet (40 mg) by mouth daily. 90 tablet 3    tacrolimus (GENERIC EQUIVALENT) 1 MG capsule Take 2 capsules (2 mg) by mouth 2 times daily. 120 capsule 11    acetaminophen (TYLENOL) 325 MG tablet Take 2 tablets (650 mg) by mouth every 6 hours as needed for mild pain. (Patient not taking: Reported on 5/19/2025) 24 tablet 0     No current facility-administered medications for this visit.     Takes Mycophenolic acid and Tacrolimus as well as recently started 0.025mg Vivelle dot    Physical Exam:   /89 (BP Location: Left arm, Patient Position: Sitting, Cuff Size: Adult Regular)   Pulse 92   Temp 97.8  F (36.6  C) (Oral)   Resp 18   Wt 58.7 kg (129 lb 8 oz)   LMP 04/01/2025 (Approximate)   SpO2 100%   BMI 18.32 kg/m    Gen: Well-appearing, NAD  HEENT: Normocephalic, atraumatic  Abd: Soft, non-distended, well-healing laparoscopic incisions with dermabond overlying incisions. No bruising, or redness.  :deferred  Ext: No LE edema    Labs : (personally reviewed  today)  Pathology reviewed as above    Imaging:   No new imaging    Assessment:  Daly Seaman is a 44 year old patient with a personal history of ESRD, in the setting of IgA nephropathy, now s/p living donor kidney transplant via retroperitoneal RLQ incision. She is now s/p RA-TLH,BS for benign fibroid    She is doing well in the postoperative period. We discussed her benign pathology and showed her the surgical images. Discussed no need for further gynecology oncology care. Plan for 6 week postoperative visit to ensure vaginal cuff healing and to lift weightlifting and pelvic restrictions    Plan:     - follow up for final postop visit in 6 weeks, no further Gyn Onc care needed, recommend ongoing yearly annual exams, no need for further PAP smears as cervix was removed.    Kalli Lizarraga MD  Gynecology Oncology  5/19/2025

## 2025-05-19 ENCOUNTER — ONCOLOGY VISIT (OUTPATIENT)
Dept: ONCOLOGY | Facility: CLINIC | Age: 45
End: 2025-05-19
Attending: STUDENT IN AN ORGANIZED HEALTH CARE EDUCATION/TRAINING PROGRAM
Payer: COMMERCIAL

## 2025-05-19 VITALS
BODY MASS INDEX: 18.32 KG/M2 | WEIGHT: 129.5 LBS | SYSTOLIC BLOOD PRESSURE: 135 MMHG | HEART RATE: 92 BPM | OXYGEN SATURATION: 100 % | DIASTOLIC BLOOD PRESSURE: 89 MMHG | TEMPERATURE: 97.8 F | RESPIRATION RATE: 18 BRPM

## 2025-05-19 DIAGNOSIS — D25.9 UTERINE LEIOMYOMA, UNSPECIFIED LOCATION: Primary | ICD-10-CM

## 2025-05-19 PROCEDURE — 99213 OFFICE O/P EST LOW 20 MIN: CPT | Mod: 24 | Performed by: STUDENT IN AN ORGANIZED HEALTH CARE EDUCATION/TRAINING PROGRAM

## 2025-05-19 PROCEDURE — 99212 OFFICE O/P EST SF 10 MIN: CPT | Performed by: STUDENT IN AN ORGANIZED HEALTH CARE EDUCATION/TRAINING PROGRAM

## 2025-05-19 ASSESSMENT — PAIN SCALES - GENERAL: PAINLEVEL_OUTOF10: NO PAIN (0)

## 2025-05-19 NOTE — LETTER
2025      Daly Seaman  [address] [place] [address]      Dear Colleague,    Thank you for referring your patient, Daly Seaman, to the [facility]. Please see a copy of my visit note below.    Gynecologic Oncology Clinic - Return Patient    Referring provider:    Paty Unger MD  [address]  [place] [address]    Patient: Daly Seaman  : 1980    Date of Visit: May 19, 2025     Reason for visit: fibroid uterus, AUB    History of Present Illness:  Daly Seaman is a 44 year old  patient with a personal history of kidney transplant due to IgA nephropathy initially referred for hysterectomy in the setting of renal transplant. She is now s/p ALVARO SOTO for benign pathology on     She reports that she's doing quite well, getting antsy without exercise. She notes that the gas pain from insufflation was the worst after surgery, which lasted a good 4-5days, but she stopped taking Tylenol after the first few days, and has had no real ongoing issues with pain. Her RLQ port site is sometimes sort, but otherwise her incisions feel fine. She is having minimal spotting and that seems to be improving. Voiding without issue - less urgency than before, constipation initially but this has since improved.     Disease History:  Known fibroid    12/3/24 - Pelvic US   Uterus - 8.1x7.8x5.9cm fundal subserosal fibroid; uterus 9.8x9.5x7.8cm; left ovary with physiologic 4cm cyst otherwise normal appearing ovaries. Endometrium measures 4.2 mm. An IUD is located within the uterine cavity.    25 - s/p EUA, RA-TLH, BS  Pathology: 9.3cm leiomyoma, cervix negative for dysplasia, fallopian tubes wnl    Screening History  Colonoscopy: N/A  Mammogram: 25 - BIRADs 1  Cervical cancer screenin/2025: ASCUS, HPV neg  2024: Colposcopy, LSIL/CIN1  2023: LSIL/CIN1, HPV neg  DEXA: , wnl    OB/Gynecologic History:  Deliveries: , s/p vaginal  delivery 13 years ago  Contraception: now s/p hyst  Menopausal status: thinks that she's perimenopausal, reports mood changes, night sweats, exhaustion  History of HRT: Yes, started Vivelle-dot (0.025mg) within the last week for perimenopausal symptoms. Discussed vaginal estrogen could be considered as well for Genitourinary symptoms down the road.  Cervical cancer screening: See screening history above.     Past Medical History:   Diagnosis Date     History of blood transfusion April 2021     History of colposcopy 12/2022 1/2024     Hypertension     variable, based on health history     IgA nephropathy     S/p kidney transplant     Thyroid disease     On watch, hypothyroid       Past Surgical History:   Procedure Laterality Date     ABDOMEN SURGERY  April 2021     CYSTOSCOPY N/A 5/2/2025    Procedure: cystoscopy;  Surgeon: Kalli Lizarraga MD;  Location: [facility]     DAVINCI HYSTERECTOMY TOTAL, BILATERAL SALPINGO-OOPHORECTOMY, NODE DISSECTION, COMBINED Bilateral 5/2/2025    Procedure: Pelvic exam under anesthesia, robot-assisted total laparoscopic hysterectomy, bilateral salpingectomy, vaginal laceration repair;  Surgeon: Kalli Lizarraga MD;  Location: [facility] OR     IR THYROID BIOPSY  02/04/2020     IR THYROID BIOPSY  09/15/2015     REMOVE INTRAUTERINE DEVICE N/A 5/2/2025    Procedure: Remove intrauterine device;  Surgeon: Kalli Lizarraga MD;  Location: [facility] OR     TRANSPLANT  April 2012    kidney   [facility] kidney transplant:  Reviewed operative report:  Living donor (mother) kidney transplant  RLQ incision retroperitoneal incision - two artery transplant (external iliac and inferior epigastric)     Social History     Tobacco Use     Smoking status: Never     Smokeless tobacco: Never   Vaping Use     Vaping status: Never Used   Substance Use Topics     Alcohol use: Yes     Comment: occasional use.     Drug use: Never   Current living situation: Lives with daughter, , and dog.  Feels safe at home.   Works as the head of a health care  team.    Family History   Problem Relation Age of Onset     Genetic Disorder Mother         Osteogenesis imperfecta     Thyroid Disease Mother         hypo thyroid     Mental Illness Father      Genetic Disorder Brother         Osteogenesis imperfecta     Allergies  Allergies   Allergen Reactions     Nsaids      transplant     Wasp Venom Protein Unknown     Current Outpatient Medications   Medication Sig Dispense Refill     albuterol (PROAIR HFA/PROVENTIL HFA/VENTOLIN HFA) 108 (90 Base) MCG/ACT inhaler Inhale 2 puffs into the lungs as needed.       aspirin 81 MG EC tablet Take 81 mg by mouth daily       Cholecalciferol (VITAMIN D3) 50 MCG (2000 UT) CAPS Take 1 capsule by mouth daily.       estradiol (VIVELLE-DOT) 0.025 MG/24HR bi-weekly patch Place 1 patch over 96 hours onto the skin twice a week. 24 patch 3     lisinopril (ZESTRIL) 2.5 MG tablet Take 1 tablet (2.5 mg) by mouth daily. 90 tablet 3     mycophenolic acid (GENERIC EQUIVALENT) 180 MG EC tablet Take 360mg (2 tablets) in the morning, take 180mg (1 tablet) midday, and take 360mg (2 tablets) in the evening. 450 tablet 3     pantoprazole (PROTONIX) 40 MG EC tablet Take 1 tablet (40 mg) by mouth daily. 90 tablet 3     tacrolimus (GENERIC EQUIVALENT) 1 MG capsule Take 2 capsules (2 mg) by mouth 2 times daily. 120 capsule 11     acetaminophen (TYLENOL) 325 MG tablet Take 2 tablets (650 mg) by mouth every 6 hours as needed for mild pain. (Patient not taking: Reported on 5/19/2025) 24 tablet 0     No current facility-administered medications for this visit.     Takes Mycophenolic acid and Tacrolimus as well as recently started 0.025mg Vivelle dot    Physical Exam:   /89 (BP Location: Left arm, Patient Position: Sitting, Cuff Size: Adult Regular)   Pulse 92   Temp 97.8  F (36.6  C) (Oral)   Resp 18   Wt 58.7 kg (129 lb 8 oz)   LMP 04/01/2025 (Approximate)   SpO2 100%   BMI 18.32 kg/m   "  Gen: Well-appearing, NAD  HEENT: Normocephalic, atraumatic  Abd: Soft, non-distended, well-healing laparoscopic incisions with dermabond overlying incisions. No bruising, or redness.  :deferred  Ext: No LE edema    Labs : (personally reviewed today)  Pathology reviewed as above    Imaging:   No new imaging    Assessment:  Daly Seaman is a 44 year old patient with a personal history of ESRD, in the setting of IgA nephropathy, now s/p living donor kidney transplant via retroperitoneal RLQ incision. She is now s/p RA-TLH,BS for benign fibroid    She is doing well in the postoperative period. We discussed her benign pathology and showed her the surgical images. Discussed no need for further gynecology oncology care. Plan for 6 week postoperative visit to ensure vaginal cuff healing and to lift weightlifting and pelvic restrictions    Plan:     - follow up for final postop visit in 6 weeks, no further Gyn Onc care needed, recommend ongoing yearly annual exams, no need for further PAP smears as cervix was removed.    Kalli Lizarraga MD  Gynecology Oncology  5/19/2025                  Oncology Rooming Note    May 19, 2025 9:32 AM   Daly Seaman is a 44 year old female who presents for:    Chief Complaint   Patient presents with     Oncology Clinic Visit     Elevated serum creatinine       Initial Vitals: /89 (BP Location: Left arm, Patient Position: Sitting, Cuff Size: Adult Regular)   Pulse 92   Temp 97.8  F (36.6  C) (Oral)   Resp 18   Wt 58.7 kg (129 lb 8 oz)   LMP 04/01/2025 (Approximate)   SpO2 100%   BMI 18.32 kg/m   Estimated body mass index is 18.32 kg/m  as calculated from the following:    Height as of 5/2/25: 1.791 m (5' 10.5\").    Weight as of this encounter: 58.7 kg (129 lb 8 oz). Body surface area is 1.71 meters squared.  No Pain (0) Comment: Data Unavailable   Patient's last menstrual period was 04/01/2025 (approximate).  Allergies reviewed: Yes  Medications reviewed: " Yes    Medications: Medication refills not needed today.  Pharmacy name entered into [facility]Up: [id number] - [id number] - [id number] AT [facility] [address]    Frailty Screening:   Is the patient here for a new oncology consult visit in cancer care? 2. No    PHQ9:  Did this patient require a PHQ9?: No      Clinical concerns: no         Lolis Malone CMA                Again, thank you for allowing me to participate in the care of your patient.        Sincerely,        Kalli Lizarraga MD    Electronically signed

## 2025-05-19 NOTE — PROGRESS NOTES
"Oncology Rooming Note    May 19, 2025 9:32 AM   Daly Seaman is a 44 year old female who presents for:    Chief Complaint   Patient presents with    Oncology Clinic Visit     Elevated serum creatinine       Initial Vitals: /89 (BP Location: Left arm, Patient Position: Sitting, Cuff Size: Adult Regular)   Pulse 92   Temp 97.8  F (36.6  C) (Oral)   Resp 18   Wt 58.7 kg (129 lb 8 oz)   LMP 04/01/2025 (Approximate)   SpO2 100%   BMI 18.32 kg/m   Estimated body mass index is 18.32 kg/m  as calculated from the following:    Height as of 5/2/25: 1.791 m (5' 10.5\").    Weight as of this encounter: 58.7 kg (129 lb 8 oz). Body surface area is 1.71 meters squared.  No Pain (0) Comment: Data Unavailable   Patient's last menstrual period was 04/01/2025 (approximate).  Allergies reviewed: Yes  Medications reviewed: Yes    Medications: Medication refills not needed today.  Pharmacy name entered into [facility]: [id number] - [place], [place] - [address] [facility] [place] [facility]    Frailty Screening:   Is the patient here for a new oncology consult visit in cancer care? 2. No    PHQ9:  Did this patient require a PHQ9?: No      Clinical concerns: no         Lolis Malone CMA              "

## 2025-06-30 ENCOUNTER — ONCOLOGY VISIT (OUTPATIENT)
Dept: ONCOLOGY | Facility: CLINIC | Age: 45
End: 2025-06-30
Attending: STUDENT IN AN ORGANIZED HEALTH CARE EDUCATION/TRAINING PROGRAM
Payer: COMMERCIAL

## 2025-06-30 VITALS
TEMPERATURE: 98.1 F | RESPIRATION RATE: 18 BRPM | BODY MASS INDEX: 18.53 KG/M2 | DIASTOLIC BLOOD PRESSURE: 70 MMHG | OXYGEN SATURATION: 100 % | HEART RATE: 80 BPM | SYSTOLIC BLOOD PRESSURE: 121 MMHG | WEIGHT: 131 LBS

## 2025-06-30 DIAGNOSIS — D25.9 UTERINE LEIOMYOMA, UNSPECIFIED LOCATION: Primary | ICD-10-CM

## 2025-06-30 PROCEDURE — 99212 OFFICE O/P EST SF 10 MIN: CPT | Mod: 24 | Performed by: STUDENT IN AN ORGANIZED HEALTH CARE EDUCATION/TRAINING PROGRAM

## 2025-06-30 PROCEDURE — 99213 OFFICE O/P EST LOW 20 MIN: CPT | Performed by: STUDENT IN AN ORGANIZED HEALTH CARE EDUCATION/TRAINING PROGRAM

## 2025-06-30 ASSESSMENT — PAIN SCALES - GENERAL: PAINLEVEL_OUTOF10: NO PAIN (0)

## 2025-06-30 NOTE — PROGRESS NOTES
Gynecologic Oncology Clinic - Return Patient    Referring provider:    Paty Unger MD  [address] [address]    Patient: Daly Seaman  : 1980    Date of Visit: 2025     Reason for visit: fibroid uterus, AUB    History of Present Illness:  Daly Seaman is a 44 year old  patient with a personal history of kidney transplant due to IgA nephropathy initially referred for hysterectomy in the setting of renal transplant. She is now s/p ALVARO SOTO for benign pathology on     She is doing very well today, she denies any issues. She is tolerating PO with issues, no bowel or bladder issues. Her clothes are fitting better since surgery. She had some pulling sensation on her right side, but thinks this is improving since surgery. No vaginal bleeding. Using scar ointment for her incisions    Disease History:  Known fibroid    12/3/24 - Pelvic US   Uterus - 8.1x7.8x5.9cm fundal subserosal fibroid; uterus 9.8x9.5x7.8cm; left ovary with physiologic 4cm cyst otherwise normal appearing ovaries. Endometrium measures 4.2 mm. An IUD is located within the uterine cavity.    25 - s/p EUA, RA-TLH, BS  Pathology: 9.3cm leiomyoma, cervix negative for dysplasia, fallopian tubes wnl    Screening History  Colonoscopy: N/A  Mammogram: 25 - BIRADs 1  Cervical cancer screenin/2025: ASCUS, HPV neg  2024: Colposcopy, LSIL/CIN1  2023: LSIL/CIN1, HPV neg  DEXA: , wnl    OB/Gynecologic History:  Deliveries: , s/p vaginal delivery 13 years ago  Contraception: now s/p hyst  Menopausal status: thinks that she's perimenopausal, reports mood changes, night sweats, exhaustion  History of HRT: Yes, started Vivelle-dot (0.025mg) within the last week for perimenopausal symptoms. Discussed vaginal estrogen could be considered as well for Genitourinary symptoms down the road.  Cervical cancer screening: See screening history above.     Past Medical History:   Diagnosis Date     History of blood transfusion April 2021    History of colposcopy 12/2022 1/2024    Hypertension     variable, based on health history    IgA nephropathy     S/p kidney transplant    Thyroid disease     On watch, hypothyroid       Past Surgical History:   Procedure Laterality Date    ABDOMEN SURGERY  April 2021    CYSTOSCOPY N/A 5/2/2025    Procedure: cystoscopy;  Surgeon: Kalli Lizarraga MD;  Location: [facility] OR    DAVINCI HYSTERECTOMY TOTAL, BILATERAL SALPINGO-OOPHORECTOMY, NODE DISSECTION, COMBINED Bilateral 5/2/2025    Procedure: Pelvic exam under anesthesia, robot-assisted total laparoscopic hysterectomy, bilateral salpingectomy, vaginal laceration repair;  Surgeon: Kalli Lizarraga MD;  Location: [facility] OR    IR THYROID BIOPSY  02/04/2020    IR THYROID BIOPSY  09/15/2015    REMOVE INTRAUTERINE DEVICE N/A 5/2/2025    Procedure: Remove intrauterine device;  Surgeon: Kalli Lizarraga MD;  Location: [facility] OR    TRANSPLANT  April 2012    kidney   [facility] kidney transplant:  Reviewed operative report:  Living donor (mother) kidney transplant  RLQ incision retroperitoneal incision - two artery transplant (external iliac and inferior epigastric)     Social History     Tobacco Use    Smoking status: Never    Smokeless tobacco: Never   Vaping Use    Vaping status: Never Used   Substance Use Topics    Alcohol use: Yes     Comment: occasional use.    Drug use: Never   Current living situation: Lives with daughter, , and dog. Feels safe at home.   Works as the head of a health care  team.    Family History   Problem Relation Age of Onset    Genetic Disorder Mother         Osteogenesis imperfecta    Thyroid Disease Mother         hypo thyroid    Mental Illness Father     Genetic Disorder Brother         Osteogenesis imperfecta     Allergies  Allergies   Allergen Reactions    Nsaids      transplant    Wasp Venom Protein Unknown     Current Outpatient Medications   Medication Sig  Dispense Refill    acetaminophen (TYLENOL) 325 MG tablet Take 2 tablets (650 mg) by mouth every 6 hours as needed for mild pain. 24 tablet 0    albuterol (PROAIR HFA/PROVENTIL HFA/VENTOLIN HFA) 108 (90 Base) MCG/ACT inhaler Inhale 2 puffs into the lungs as needed.      aspirin 81 MG EC tablet Take 81 mg by mouth daily      Cholecalciferol (VITAMIN D3) 50 MCG (2000 UT) CAPS Take 1 capsule by mouth daily.      estradiol (VIVELLE-DOT) 0.025 MG/24HR bi-weekly patch Place 1 patch over 96 hours onto the skin twice a week. 24 patch 3    lisinopril (ZESTRIL) 2.5 MG tablet Take 1 tablet (2.5 mg) by mouth daily. 90 tablet 3    mycophenolic acid (GENERIC EQUIVALENT) 180 MG EC tablet Take 360mg (2 tablets) in the morning, take 180mg (1 tablet) midday, and take 360mg (2 tablets) in the evening. 450 tablet 3    pantoprazole (PROTONIX) 40 MG EC tablet Take 1 tablet (40 mg) by mouth daily. 90 tablet 3    tacrolimus (GENERIC EQUIVALENT) 1 MG capsule Take 2 capsules (2 mg) by mouth 2 times daily. 120 capsule 11     No current facility-administered medications for this visit.     Takes Mycophenolic acid and Tacrolimus as well as recently started 0.025mg Vivelle dot    Physical Exam:   /70 (BP Location: Left arm, Patient Position: Sitting, Cuff Size: Adult Regular)   Pulse 80   Temp 98.1  F (36.7  C) (Oral)   Resp 18   Wt 59.4 kg (131 lb)   LMP 04/01/2025 (Approximate)   SpO2 100%   BMI 18.53 kg/m    Gen: Well-appearing, NAD  HEENT: Normocephalic, atraumatic  Abd: Soft, non-distended, well-healing laparoscopic incisions. No bruising, or redness.  : Well-healing vaginal cuff, stitches visible but intact cuff visibly and palpably  Ext: No LE edema    Labs : (personally reviewed today)  No new labs    Imaging:   No new imaging    Assessment:  Daly Seaman is a 44 year old patient with a personal history of ESRD, in the setting of IgA nephropathy, now s/p living donor kidney transplant via retroperitoneal RLQ incision.  She is now s/p FERDINAND-ANDREW,BS for benign fibroid    She is doing well in the postoperative period. Discussed no need for further gynecology oncology care. No need for ongoing restrictions, ok to exercise and no more pelvic rest. Encouraged Daly to reach out with any questions or concerns in the future    Plan:     - no further Gyn Onc care needed, recommend ongoing yearly annual exams, no need for further PAP smears as cervix was removed.  --discussed recommendation to talk to primary OBGYN for management of estrogen patch and potential for increasing dose due to new/worse perimenopausal symptoms    Kalli Lizarraga MD  Gynecology Oncology  6/30/2025

## 2025-06-30 NOTE — LETTER
2025      Daly Seaman  [address] [place] [address]      Dear Colleague,    Thank you for referring your patient, Daly Seaman, to the [facility]. Please see a copy of my visit note below.    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 She is now s/p ALVARO SOTO for benign pathology on     She is doing very well today, she denies any issues. She is tolerating PO with issues, no bowel or bladder issues. Her clothes are fitting better since surgery. She had some pulling sensation on her right side, but thinks this is improving since surgery. No vaginal bleeding. Using scar ointment for her incisions    Disease History:  Known fibroid    12/3/24 - Pelvic US   Uterus - 8.1x7.8x5.9cm fundal subserosal fibroid; uterus 9.8x9.5x7.8cm; left ovary with physiologic 4cm cyst otherwise normal appearing ovaries. Endometrium measures 4.2 mm. An IUD is located within the uterine cavity.    25 - s/p EUA, RA-TLH, BS  Pathology: 9.3cm leiomyoma, cervix negative for dysplasia, fallopian tubes wnl    Screening History  Colonoscopy: N/A  Mammogram: 25 - BIRADs 1  Cervical cancer screenin/2025: ASCUS, HPV neg  2024: Colposcopy, LSIL/CIN1  2023: LSIL/CIN1, HPV neg  DEXA: , wnl    OB/Gynecologic History:  Deliveries: , s/p vaginal delivery 13 years ago  Contraception: now s/p hyst  Menopausal status: thinks that she's perimenopausal, reports mood changes, night sweats, exhaustion  History of HRT: Yes, started Vivelle-dot (0.025mg) within  lmomtcb the last week for perimenopausal symptoms. Discussed vaginal estrogen could be considered as well for Genitourinary symptoms down the road.  Cervical cancer screening: See screening history above.     Past Medical History:   Diagnosis Date     History of blood transfusion April 2021     History of colposcopy 12/2022 1/2024     Hypertension     variable, based on health history     IgA nephropathy     S/p kidney transplant     Thyroid disease     On watch, hypothyroid       Past Surgical History:   Procedure Laterality Date     ABDOMEN SURGERY  April 2021     CYSTOSCOPY N/A 5/2/2025    Procedure: cystoscopy;  Surgeon: Kalli Lizarraga MD;  Location: [facility] OR     DAVINCI HYSTERECTOMY TOTAL, BILATERAL SALPINGO-OOPHORECTOMY, NODE DISSECTION, COMBINED Bilateral 5/2/2025    Procedure: Pelvic exam under anesthesia, robot-assisted total laparoscopic hysterectomy, bilateral salpingectomy, vaginal laceration repair;  Surgeon: Kalli Lizarraga MD;  Location: [facility] OR     IR THYROID BIOPSY  02/04/2020     IR THYROID BIOPSY  09/15/2015     REMOVE INTRAUTERINE DEVICE N/A 5/2/2025    Procedure: Remove intrauterine device;  Surgeon: Kalli Lizarraga MD;  Location: [facility] OR     TRANSPLANT  April 2012    kidney   [facility] kidney transplant:  Reviewed operative report:  Living donor (mother) kidney transplant  RLQ incision retroperitoneal incision - two artery transplant (external iliac and inferior epigastric)     Social History     Tobacco Use     Smoking status: Never     Smokeless tobacco: Never   Vaping Use     Vaping status: Never Used   Substance Use Topics     Alcohol use: Yes     Comment: occasional use.     Drug use: Never   Current living situation: Lives with daughter, , and dog. Feels safe at home.   Works as the head of a health care  team.    Family History   Problem Relation Age of Onset     Genetic Disorder Mother         Osteogenesis imperfecta     Thyroid Disease Mother          hypo thyroid     Mental Illness Father      Genetic Disorder Brother         Osteogenesis imperfecta     Allergies  Allergies   Allergen Reactions     Nsaids      transplant     Wasp Venom Protein Unknown     Current Outpatient Medications   Medication Sig Dispense Refill     acetaminophen (TYLENOL) 325 MG tablet Take 2 tablets (650 mg) by mouth every 6 hours as needed for mild pain. 24 tablet 0     albuterol (PROAIR HFA/PROVENTIL HFA/VENTOLIN HFA) 108 (90 Base) MCG/ACT inhaler Inhale 2 puffs into the lungs as needed.       aspirin 81 MG EC tablet Take 81 mg by mouth daily       Cholecalciferol (VITAMIN D3) 50 MCG (2000 UT) CAPS Take 1 capsule by mouth daily.       estradiol (VIVELLE-DOT) 0.025 MG/24HR bi-weekly patch Place 1 patch over 96 hours onto the skin twice a week. 24 patch 3     lisinopril (ZESTRIL) 2.5 MG tablet Take 1 tablet (2.5 mg) by mouth daily. 90 tablet 3     mycophenolic acid (GENERIC EQUIVALENT) 180 MG EC tablet Take 360mg (2 tablets) in the morning, take 180mg (1 tablet) midday, and take 360mg (2 tablets) in the evening. 450 tablet 3     pantoprazole (PROTONIX) 40 MG EC tablet Take 1 tablet (40 mg) by mouth daily. 90 tablet 3     tacrolimus (GENERIC EQUIVALENT) 1 MG capsule Take 2 capsules (2 mg) by mouth 2 times daily. 120 capsule 11     No current facility-administered medications for this visit.     Takes Mycophenolic acid and Tacrolimus as well as recently started 0.025mg Vivelle dot    Physical Exam:   /70 (BP Location: Left arm, Patient Position: Sitting, Cuff Size: Adult Regular)   Pulse 80   Temp 98.1  F (36.7  C) (Oral)   Resp 18   Wt 59.4 kg (131 lb)   LMP 04/01/2025 (Approximate)   SpO2 100%   BMI 18.53 kg/m    Gen: Well-appearing, NAD  HEENT: Normocephalic, atraumatic  Abd: Soft, non-distended, well-healing laparoscopic incisions. No bruising, or redness.  : Well-healing vaginal cuff, stitches visible but intact cuff visibly and palpably  Ext: No LE edema    Labs :  (personally reviewed today)  No new labs    Imaging:   No new imaging    Assessment:  Daly Seaman is a 44 year old patient with a personal history of ESRD, in the setting of IgA nephropathy, now s/p living donor kidney transplant via retroperitoneal RLQ incision. She is now s/p RA-TLH,BS for benign fibroid    She is doing well in the postoperative period. Discussed no need for further gynecology oncology care. No need for ongoing restrictions, ok to exercise and no more pelvic rest. Encouraged Daly to reach out with any questions or concerns in the future    Plan:     - no further Gyn Onc care needed, recommend ongoing yearly annual exams, no need for further PAP smears as cervix was removed.  --discussed recommendation to talk to primary OBGYN for management of estrogen patch and potential for increasing dose due to new/worse perimenopausal symptoms    Kalli Lizarraga MD  Gynecology Oncology  6/30/2025                  Again, thank you for allowing me to participate in the care of your patient.        Sincerely,        Kalli Lizarraga MD    Electronically signed

## 2025-07-05 ENCOUNTER — LAB (OUTPATIENT)
Dept: LAB | Facility: CLINIC | Age: 45
End: 2025-07-05
Payer: COMMERCIAL

## 2025-07-05 DIAGNOSIS — Z48.298 AFTERCARE FOLLOWING ORGAN TRANSPLANT: ICD-10-CM

## 2025-07-05 DIAGNOSIS — Z20.828 CONTACT WITH AND (SUSPECTED) EXPOSURE TO OTHER VIRAL COMMUNICABLE DISEASES: ICD-10-CM

## 2025-07-05 DIAGNOSIS — Z79.899 ENCOUNTER FOR LONG-TERM CURRENT USE OF MEDICATION: ICD-10-CM

## 2025-07-05 DIAGNOSIS — Z94.0 KIDNEY REPLACED BY TRANSPLANT: ICD-10-CM

## 2025-07-05 DIAGNOSIS — Z98.890 OTHER SPECIFIED POSTPROCEDURAL STATES: ICD-10-CM

## 2025-07-05 LAB
ANION GAP SERPL CALCULATED.3IONS-SCNC: 11 MMOL/L (ref 7–15)
BUN SERPL-MCNC: 21.8 MG/DL (ref 6–20)
CALCIUM SERPL-MCNC: 9.6 MG/DL (ref 8.8–10.4)
CHLORIDE SERPL-SCNC: 106 MMOL/L (ref 98–107)
CREAT SERPL-MCNC: 1.47 MG/DL (ref 0.51–0.95)
EGFRCR SERPLBLD CKD-EPI 2021: 45 ML/MIN/1.73M2
ERYTHROCYTE [DISTWIDTH] IN BLOOD BY AUTOMATED COUNT: 12.3 % (ref 10–15)
GLUCOSE SERPL-MCNC: 83 MG/DL (ref 70–99)
HCO3 SERPL-SCNC: 22 MMOL/L (ref 22–29)
HCT VFR BLD AUTO: 36.7 % (ref 35–47)
HGB BLD-MCNC: 11.5 G/DL (ref 11.7–15.7)
MCH RBC QN AUTO: 28.1 PG (ref 26.5–33)
MCHC RBC AUTO-ENTMCNC: 31.3 G/DL (ref 31.5–36.5)
MCV RBC AUTO: 90 FL (ref 78–100)
PLATELET # BLD AUTO: 266 10E3/UL (ref 150–450)
POTASSIUM SERPL-SCNC: 4.6 MMOL/L (ref 3.4–5.3)
RBC # BLD AUTO: 4.09 10E6/UL (ref 3.8–5.2)
SODIUM SERPL-SCNC: 139 MMOL/L (ref 135–145)
WBC # BLD AUTO: 6.5 10E3/UL (ref 4–11)

## 2025-07-05 PROCEDURE — 80048 BASIC METABOLIC PNL TOTAL CA: CPT

## 2025-07-05 PROCEDURE — 36415 COLL VENOUS BLD VENIPUNCTURE: CPT

## 2025-07-05 PROCEDURE — 80197 ASSAY OF TACROLIMUS: CPT

## 2025-07-05 PROCEDURE — 85027 COMPLETE CBC AUTOMATED: CPT

## 2025-07-06 LAB
TACROLIMUS BLD-MCNC: 5.6 UG/L (ref 5–15)
TME LAST DOSE: NORMAL H
TME LAST DOSE: NORMAL H

## 2025-07-07 ENCOUNTER — RESULTS FOLLOW-UP (OUTPATIENT)
Dept: TRANSPLANT | Facility: CLINIC | Age: 45
End: 2025-07-07
Payer: COMMERCIAL

## 2025-07-08 DIAGNOSIS — Z94.0 KIDNEY TRANSPLANTED: ICD-10-CM

## 2025-07-08 DIAGNOSIS — D84.9 IMMUNOSUPPRESSED STATUS: ICD-10-CM

## 2025-07-08 RX ORDER — TACROLIMUS 1 MG/1
CAPSULE ORAL
Qty: 270 CAPSULE | Refills: 3 | Status: SHIPPED | OUTPATIENT
Start: 2025-07-08

## (undated) DEVICE — SOL NACL 0.9% INJ 1000ML BAG 2B1324X

## (undated) DEVICE — DAVINCI XI OBTURATOR BLADELESS 8MM 470359

## (undated) DEVICE — DAVINCI XI SEAL UNIVERSAL 5-12MM 470500

## (undated) DEVICE — PREP CHLORAPREP 26ML TINTED HI-LITE ORANGE 930815

## (undated) DEVICE — TUBING IRRIG CYSTO/BLADDER SET 81" LF 2C4040

## (undated) DEVICE — SU DERMABOND ADVANCED .7ML DNX12

## (undated) DEVICE — SU WND CLOSURE VLOC 180 ABS 0 9" GS-21 VLOCL0346

## (undated) DEVICE — DAVINCI XI DRAPE COLUMN 470341

## (undated) DEVICE — Device

## (undated) DEVICE — NDL INSUFFLATION 13GA 120MM C2201

## (undated) DEVICE — DRAPE SHEET REV FOLD 3/4 9349

## (undated) DEVICE — ENDO TROCAR CONMED AIRSEAL BLADELESS 08X120MM IAS8-120LP

## (undated) DEVICE — SUCTION IRR STRYKERFLOW II W/TIP 250-070-520

## (undated) DEVICE — LINEN TOWEL PACK X30 5481

## (undated) DEVICE — DAVINCI HOT SHEARS TIP COVER  400180

## (undated) DEVICE — KOH COLPOTOMIZER OCCLUDER  CPO-6

## (undated) DEVICE — SUCTION MANIFOLD NEPTUNE 2 SYS 4 PORT 0702-020-000

## (undated) DEVICE — SYR 10ML FINGER CONTROL W/O NDL 309695

## (undated) DEVICE — GLOVE BIOGEL PI MICRO SZ 5.5 48555

## (undated) DEVICE — DAVINCI XI NDL DRIVER MEGA SUTURE CUT 8MM 470309

## (undated) DEVICE — KIT PATIENT POSITIONING PIGAZZI LATEX FREE 40580

## (undated) DEVICE — DRAPE SHEET MED 44X70" 9355

## (undated) DEVICE — SU VICRYL 2-0 SH 27" UND J417H

## (undated) DEVICE — SU MONOCRYL 4-0 PS-2 18" UND Y496G

## (undated) DEVICE — DAVINCI XI ESU BIPOLAR 3MM ENDOWRIST FENESTRATED EXT 471205

## (undated) DEVICE — ANTIFOG SOLUTION SEE SHARP 150M TROCAR SWABS 30978 (COI)

## (undated) DEVICE — DAVINCI XI GRASPER ENDOWRIST PROGRASP 470093

## (undated) DEVICE — DAVINCI XI DRAPE ARM 470015

## (undated) DEVICE — RETR ELEV / UTERINE MANIPULATOR V-CARE LG CUP 60-6085-202A

## (undated) DEVICE — DRAPE MAYO STAND 23X54 8337

## (undated) DEVICE — TUBING FILTER TRI-LUMEN AIRSEAL ASC-EVAC1

## (undated) DEVICE — DAVINCI XI MONOPOLAR SCISSORS HOT SHEARS 8MM 470179

## (undated) DEVICE — GLOVE BIOGEL PI MICRO INDICATOR UNDERGLOVE SZ 5.5 48955

## (undated) RX ORDER — PROPOFOL 10 MG/ML
INJECTION, EMULSION INTRAVENOUS
Status: DISPENSED
Start: 2025-05-02

## (undated) RX ORDER — METHOCARBAMOL 500 MG/1
TABLET, FILM COATED ORAL
Status: DISPENSED
Start: 2025-05-02

## (undated) RX ORDER — HEPARIN SODIUM 5000 [USP'U]/.5ML
INJECTION, SOLUTION INTRAVENOUS; SUBCUTANEOUS
Status: DISPENSED
Start: 2025-05-02

## (undated) RX ORDER — DEXAMETHASONE SODIUM PHOSPHATE 4 MG/ML
INJECTION, SOLUTION INTRA-ARTICULAR; INTRALESIONAL; INTRAMUSCULAR; INTRAVENOUS; SOFT TISSUE
Status: DISPENSED
Start: 2025-05-02

## (undated) RX ORDER — METRONIDAZOLE 500 MG/100ML
INJECTION, SOLUTION INTRAVENOUS
Status: DISPENSED
Start: 2025-05-02

## (undated) RX ORDER — FENTANYL CITRATE 50 UG/ML
INJECTION, SOLUTION INTRAMUSCULAR; INTRAVENOUS
Status: DISPENSED
Start: 2025-05-02

## (undated) RX ORDER — ONDANSETRON 2 MG/ML
INJECTION INTRAMUSCULAR; INTRAVENOUS
Status: DISPENSED
Start: 2025-05-02

## (undated) RX ORDER — HYDROMORPHONE HCL IN WATER/PF 6 MG/30 ML
PATIENT CONTROLLED ANALGESIA SYRINGE INTRAVENOUS
Status: DISPENSED
Start: 2025-05-02

## (undated) RX ORDER — FENTANYL CITRATE-0.9 % NACL/PF 10 MCG/ML
PLASTIC BAG, INJECTION (ML) INTRAVENOUS
Status: DISPENSED
Start: 2025-05-02

## (undated) RX ORDER — METOPROLOL TARTRATE 1 MG/ML
INJECTION, SOLUTION INTRAVENOUS
Status: DISPENSED
Start: 2025-05-02

## (undated) RX ORDER — SODIUM CHLORIDE, SODIUM LACTATE, POTASSIUM CHLORIDE, CALCIUM CHLORIDE 600; 310; 30; 20 MG/100ML; MG/100ML; MG/100ML; MG/100ML
INJECTION, SOLUTION INTRAVENOUS
Status: DISPENSED
Start: 2025-05-02

## (undated) RX ORDER — ESMOLOL HYDROCHLORIDE 10 MG/ML
INJECTION INTRAVENOUS
Status: DISPENSED
Start: 2025-05-02

## (undated) RX ORDER — OXYCODONE HYDROCHLORIDE 10 MG/1
TABLET ORAL
Status: DISPENSED
Start: 2025-05-02

## (undated) RX ORDER — BUPIVACAINE HYDROCHLORIDE 2.5 MG/ML
INJECTION, SOLUTION EPIDURAL; INFILTRATION; INTRACAUDAL; PERINEURAL
Status: DISPENSED
Start: 2025-05-02

## (undated) RX ORDER — LABETALOL HYDROCHLORIDE 5 MG/ML
INJECTION, SOLUTION INTRAVENOUS
Status: DISPENSED
Start: 2025-05-02